# Patient Record
Sex: MALE | Race: WHITE | NOT HISPANIC OR LATINO | Employment: OTHER | URBAN - METROPOLITAN AREA
[De-identification: names, ages, dates, MRNs, and addresses within clinical notes are randomized per-mention and may not be internally consistent; named-entity substitution may affect disease eponyms.]

---

## 2017-01-05 ENCOUNTER — MEDICAL CORRESPONDENCE (OUTPATIENT)
Dept: FAMILY MEDICINE | Facility: CLINIC | Age: 72
End: 2017-01-05

## 2017-02-15 ENCOUNTER — TELEPHONE (OUTPATIENT)
Dept: FAMILY MEDICINE | Facility: CLINIC | Age: 72
End: 2017-02-15

## 2017-05-03 DIAGNOSIS — Z76.0 ENCOUNTER FOR MEDICATION REFILL: Primary | ICD-10-CM

## 2017-05-04 RX ORDER — TRAMADOL HYDROCHLORIDE 50 MG/1
TABLET ORAL
Qty: 21 TABLET | Refills: 0 | Status: SHIPPED | OUTPATIENT
Start: 2017-05-04 | End: 2017-10-10

## 2017-05-04 NOTE — TELEPHONE ENCOUNTER
Pending Prescriptions:                       Disp   Refills    traMADol (ULTRAM) 50 MG tablet [Pharmacy M*21 tab*0        Sig: TAKE 1 TABLET BY MOUTH THREE TIMES DAILY AS NEEDED           FOR 7 DAYS    Last OV 9/9/16  TSH, Lipid 5/26/16  CBC, BMP 7/13/15

## 2017-07-03 DIAGNOSIS — Z76.0 ENCOUNTER FOR MEDICATION REFILL: Primary | ICD-10-CM

## 2017-07-03 DIAGNOSIS — Z86.73 HISTORY OF STROKE: ICD-10-CM

## 2017-07-03 RX ORDER — METFORMIN HCL 500 MG
500 TABLET, EXTENDED RELEASE 24 HR ORAL
Qty: 90 TABLET | Refills: 0 | Status: SHIPPED | OUTPATIENT
Start: 2017-07-03 | End: 2017-10-16

## 2017-07-03 RX ORDER — FENOFIBRATE 160 MG/1
160 TABLET ORAL DAILY
Qty: 90 TABLET | Refills: 0 | Status: SHIPPED | OUTPATIENT
Start: 2017-07-03 | End: 2017-10-16

## 2017-10-10 ENCOUNTER — OFFICE VISIT (OUTPATIENT)
Dept: FAMILY MEDICINE | Facility: CLINIC | Age: 72
End: 2017-10-10

## 2017-10-10 VITALS
OXYGEN SATURATION: 96 % | WEIGHT: 220 LBS | DIASTOLIC BLOOD PRESSURE: 80 MMHG | HEART RATE: 85 BPM | BODY MASS INDEX: 31.12 KG/M2 | SYSTOLIC BLOOD PRESSURE: 114 MMHG

## 2017-10-10 DIAGNOSIS — G62.9 PERIPHERAL POLYNEUROPATHY: ICD-10-CM

## 2017-10-10 DIAGNOSIS — I63.10 CEREBROVASCULAR ACCIDENT (CVA) DUE TO EMBOLISM OF PRECEREBRAL ARTERY (H): ICD-10-CM

## 2017-10-10 DIAGNOSIS — Z23 NEED FOR PROPHYLACTIC VACCINATION AND INOCULATION AGAINST INFLUENZA: Primary | ICD-10-CM

## 2017-10-10 DIAGNOSIS — E11.49 DM TYPE 2 CAUSING NEUROLOGICAL DISEASE (H): ICD-10-CM

## 2017-10-10 DIAGNOSIS — M54.6 ACUTE MIDLINE THORACIC BACK PAIN: ICD-10-CM

## 2017-10-10 LAB
% GRANULOCYTES: 62.7 % (ref 42.2–75.2)
HCT VFR BLD AUTO: 53 % (ref 39–51)
HEMOGLOBIN: 17.1 G/DL (ref 13.4–17.5)
LYMPHOCYTES NFR BLD AUTO: 29.6 % (ref 20.5–51.1)
MCH RBC QN AUTO: 29.4 PG (ref 27–31)
MCHC RBC AUTO-ENTMCNC: 32.3 G/DL (ref 33–37)
MCV RBC AUTO: 90.8 FL (ref 80–100)
MONOCYTES NFR BLD AUTO: 7.7 % (ref 1.7–9.3)
PLATELET # BLD AUTO: 150 K/UL (ref 140–450)
RBC # BLD AUTO: 5.83 X10/CMM (ref 4.2–5.9)
WBC # BLD AUTO: 7.1 X10/CMM (ref 3.8–11)

## 2017-10-10 PROCEDURE — G0008 ADMIN INFLUENZA VIRUS VAC: HCPCS | Performed by: FAMILY MEDICINE

## 2017-10-10 PROCEDURE — 99214 OFFICE O/P EST MOD 30 MIN: CPT | Mod: 25 | Performed by: FAMILY MEDICINE

## 2017-10-10 PROCEDURE — 36415 COLL VENOUS BLD VENIPUNCTURE: CPT | Performed by: FAMILY MEDICINE

## 2017-10-10 PROCEDURE — 90662 IIV NO PRSV INCREASED AG IM: CPT | Performed by: FAMILY MEDICINE

## 2017-10-10 PROCEDURE — 80053 COMPREHEN METABOLIC PANEL: CPT | Mod: 90 | Performed by: FAMILY MEDICINE

## 2017-10-10 PROCEDURE — 85025 COMPLETE CBC W/AUTO DIFF WBC: CPT | Performed by: FAMILY MEDICINE

## 2017-10-10 PROCEDURE — 83036 HEMOGLOBIN GLYCOSYLATED A1C: CPT | Mod: 90 | Performed by: FAMILY MEDICINE

## 2017-10-10 RX ORDER — SOTALOL HYDROCHLORIDE 80 MG/1
TABLET ORAL
COMMUNITY
Start: 2017-05-11 | End: 2020-03-31

## 2017-10-10 RX ORDER — TRAMADOL HYDROCHLORIDE 50 MG/1
TABLET ORAL
Refills: 0 | COMMUNITY
Start: 2017-05-04 | End: 2020-02-25

## 2017-10-10 NOTE — PROGRESS NOTES
Injectable Influenza Immunization Documentation    1.  Is the person to be vaccinated sick today?   No    2. Does the person to be vaccinated have an allergy to a component   of the vaccine?   No    3. Has the person to be vaccinated ever had a serious reaction   to influenza vaccine in the past?   No    4. Has the person to be vaccinated ever had Guillain-Barré syndrome?   No    Form completed by MONET GUADARRAMA MA

## 2017-10-10 NOTE — MR AVS SNAPSHOT
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  Call member services at your health plan with any benefit or coverage questions.                  Who to contact     If you have questions or need follow up information about today's clinic visit or your schedule please contact [facility] directly at [phone].  Normal or non-critical lab and imaging results will be communicated to you by [employer], letter or phone within 4 business days after the clinic has received the results. If you do not hear from us within 7 days, please contact the clinic through [employer] or phone. If you have a critical or abnormal lab result, we will notify you by phone as soon as possible.  Submit refill requests through [employer] or call your pharmacy and they will forward the refill request to us. Please allow 3 business days for your refill to be completed.          Additional Information About Your Visit        [employer] Information     [employer] One lets you send messages to your doctor, view your test results, renew your prescriptions, schedule appointments and more. To sign  "up, go to [email or web address] . Click on \"Log in\" on the left side of the screen, which will take you to the Welcome page. Then click on \"Sign up Now\" on the right side of the page.     You will be asked to enter the access code listed below, as well as some personal information. Please follow the directions to create your username and password.     Your access code is: [email or web address]  Expires: 2018  1:59 PM     Your access code will  in 90 days. If you need help or a new code, please call your [facility] clinic or [phone].        Care EveryWhere ID     This is your Care EveryWhere ID. This could be used by other organizations to access your [facility] medical records  [id number]        Your Vitals Were     Pulse Pulse Oximetry BMI (Body Mass Index)             85 96% 31.12 kg/m2          Blood Pressure from Last 3 Encounters:   10/10/17 114/80   16 102/88   07/14/15 (!) 142/104    Weight from Last 3 Encounters:   10/10/17 99.8 kg (220 lb)   16 95.7 kg (211 lb)   07/13/15 100.4 kg (221 lb 5.5 oz)              We Performed the Following     CBC with Diff/Plt ([facility])     Comp. Metabolic Panel (14) ([facility])     FLU VACCINE, INCREASED ANTIGEN, PRESV FREE, AGE 65+ [01525]     Hemoglobin A1C ([facility])     Referral to [facility]          Today's Medication Changes          These changes are accurate as of: 10/10/17  1:59 PM.  If you have any questions, ask your nurse or doctor.               Start taking these medicines.        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  Ask your nurse if you have questions.     You don't need a prescription for these medications     ACE/ARB NOT " PRESCRIBED (INTENTIONAL)                Primary Care Provider Office Phone # Fax #    Sean Madrid [phone] [address]  [facility] [address]        Equal Access to Services     FRANCESCA HONG : Hadii aad ku debo Soomaali, waaxda luqadaha, qaybta kaalmada adeurielyada, ana phamn татьяна holden laheshamblaine yan. So [facility] [phone].    ATENCIÓN: Si habla español, tiene a muro disposición servicios gratuitos de asistencia lingüística. Llame al [phone].    We comply with applicable federal civil rights laws and [place] laws. We do not discriminate on the basis of race, color, national origin, age, disability, sex, sexual orientation, or gender identity.            Thank you!     Thank you for choosing [facility]  for your care. Our goal is always to provide you with excellent care. Hearing back from our patients is one way we can continue to improve our services. Please take a few minutes to complete the written survey that you may receive in the mail after your visit with us. Thank you!             Your Updated Medication List - Protect others around you: Learn how to safely use, store and throw away your medicines at www.disposemymeds.org.          This list is accurate as of: 10/10/17  1:59 PM.  Always use your most recent med list.                   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)       apixaban ANTICOAGULANT 5 MG tablet    ELIQUIS     Take 5 mg by mouth        ASPIRIN EC PO      Take 81 mg by mouth daily        colchicine 0.6 MG tablet      TAKE 2 TS BY MOUTH PRN AND REPEAT 1 T AFTER 1 HOUR        cyanocobalamin 1000 MCG tablet    vitamin  B-12     Take 5,000 mcg by mouth daily        digoxin 125 MCG tablet    LANOXIN    5 tablet    Take 1 tablet (125 mcg) by mouth daily    History of stroke, Atrial  fibrillation (H)       fenofibrate 160 MG tablet     90 tablet    Take 1 tablet (160 mg) by mouth daily    History of stroke       LIPITOR PO      Take 20 mg by mouth daily        metFORMIN 500 MG 24 hr tablet    GLUCOPHAGE-XR    90 tablet    Take 1 tablet (500 mg) by mouth daily (with dinner)    Encounter for medication refill       metoprolol 25 MG tablet    LOPRESSOR    10 tablet    Take 1 tablet (25 mg) by mouth 2 times daily    History of stroke       sotalol 80 MG tablet    BETAPACE          traMADol 50 MG tablet    ULTRAM     TK 1 T PO TID PRF 7 DAYS

## 2017-10-10 NOTE — LETTER
Former[facility]  [address] [place]  [address]  Phone: [phone]    October 13, 2017      Abhay Taylor  [address] [address]              Dear Abhay,    I am writing to report that your included test results are within expected ranges. I do not suggest that we make any changes at this time.         Sincerely,     Sean Madrid M.D.    Results for orders placed or performed in visit on 10/10/17   Comp. Metabolic Panel (14) ([facility])   Result Value Ref Range    Glucose 92 65 - 99 mg/dL    Urea Nitrogen 20 8 - 27 mg/dL    Creatinine 1.01 0.76 - 1.27 mg/dL    eGFR If NonAfricn Am 74 >59 mL/min/1.73    eGFR If Africn Am 86 >59 mL/min/1.73    BUN/Creatinine Ratio 20 10 - 24    Sodium 143 134 - 144 mmol/L    Potassium 5.0 3.5 - 5.2 mmol/L    Chloride 103 96 - 106 mmol/L    Total CO2 26 18 - 28 mmol/L    Calcium 9.6 8.6 - 10.2 mg/dL    Protein Total 7.2 6.0 - 8.5 g/dL    Albumin 4.7 3.5 - 4.8 g/dL    Globulin, Total 2.5 1.5 - 4.5 g/dL    A/G Ratio 1.9 1.2 - 2.2    Bilirubin Total 0.5 0.0 - 1.2 mg/dL    Alkaline Phosphatase 62 39 - 117 IU/L    AST 21 0 - 40 IU/L    ALT 25 0 - 44 IU/L    Narrative    Performed at:  01 - [facility] [address], [place], [place]  [id number]  : Luis Hidalgo MD, Phone:  [phone]   CBC with Diff/Plt ([facility])   Result Value Ref Range    WBC x10/cmm 7.1 3.8 - 11.0 x10/cmm    % Lymphocytes 29.6 20.5 - 51.1 %    % Monocytes 7.7 1.7 - 9.3 %    % Granulocytes 62.7 42.2 - 75.2 %    RBC x10/cmm 5.83 4.2 - 5.9 x10/cmm    Hemoglobin 17.1 13.4 - 17.5 g/dl    Hematocrit 53.0 (A) 39 - 51 %    MCV 90.8 80 - 100 fL    MCH 29.4 27.0 - 31.0 pg    MCHC 32.3 (A) 33.0 - 37.0 g/dL    Platelet Count 150 140 - 450 K/uL   Hemoglobin A1C ([facility])   Result Value Ref Range    Hemoglobin A1C 6.0 (H) 4.8 - 5.6 %    Narrative    Performed at:  01 - [facility] [address], [place], [place]  [id number]  : Luis Hidalgo MD, Phone:   [phone]

## 2017-10-10 NOTE — PROGRESS NOTES
Abhay Taylor is a 72 year old male who presents with burning low back 6/10 for 1 month.    There is no history of injury.    The pain is located on the left.  The back pain does not radiate.  Measures which improve the pain include heat and hot tub.  Measures which worsen the pain include sneezing, lying and sitting  There is no history of abdominal pain or change in urination.    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      Hypertension      Peripheral polyneuropathy 9/9/2016     Presbycusis of both ears 9/9/2016    severe       Social History     Social History     Marital status:      Spouse name: N/A     Number of children: N/A     Years of education: N/A     Occupational History            d     Social History Main Topics     Smoking status: Former Smoker     Smokeless tobacco: Never Used     Alcohol use 1.2 oz/week     2 Standard drinks or equivalent per week     Drug use: Not on file     Sexual activity: Not on file     Other Topics Concern     Not on file     Social History Narrative       EXAM: The patient today appears comfortable.  VITALS: Blood pressure 114/80, pulse 85, weight 99.8 kg (220 lb), SpO2 96 %.  Back symmetric, no curvature. ROM normal. No CVA tenderness.  EXT: Lower extremities have excellent strength bilatterally  NEURO:  Sensation to light touch intact bilaterally  DTRs normal and symmetric throughout    Assessment/Plan:  Abhay was seen today for back dull pain  and flu shot.    Diagnoses and all orders for this visit:    Need for prophylactic vaccination and inoculation against influenza  -     FLU VACCINE, INCREASED ANTIGEN, PRESV FREE, AGE 65+ [36844]    Acute midline thoracic back pain  -     Referral to [facility]    Cerebrovascular accident (CVA) due to embolism of precerebral artery (H)  -     Comp. 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  Apply heat thereafter as needed.    Active range of motion exercises encouraged.    Proper lifting techniques and back care discussed.   Physical therapy will be considered if the patient is not improving in the next several weeks.    Sean Madrid MD

## 2017-10-11 LAB
ALBUMIN SERPL-MCNC: 4.7 G/DL (ref 3.5–4.8)
ALBUMIN/GLOB SERPL: 1.9 {RATIO} (ref 1.2–2.2)
ALP SERPL-CCNC: 62 IU/L (ref 39–117)
ALT SERPL-CCNC: 25 IU/L (ref 0–44)
AST SERPL-CCNC: 21 IU/L (ref 0–40)
BILIRUB SERPL-MCNC: 0.5 MG/DL (ref 0–1.2)
BUN SERPL-MCNC: 20 MG/DL (ref 8–27)
BUN/CREATININE RATIO: 20 (ref 10–24)
CALCIUM SERPL-MCNC: 9.6 MG/DL (ref 8.6–10.2)
CHLORIDE SERPLBLD-SCNC: 103 MMOL/L (ref 96–106)
CREAT SERPL-MCNC: 1.01 MG/DL (ref 0.76–1.27)
EGFR IF AFRICN AM: 86 ML/MIN/1.73
EGFR IF NONAFRICN AM: 74 ML/MIN/1.73
GLOBULIN, TOTAL: 2.5 G/DL (ref 1.5–4.5)
GLUCOSE SERPL-MCNC: 92 MG/DL (ref 65–99)
HBA1C MFR BLD: 6 % (ref 4.8–5.6)
POTASSIUM SERPL-SCNC: 5 MMOL/L (ref 3.5–5.2)
PROT SERPL-MCNC: 7.2 G/DL (ref 6–8.5)
SODIUM SERPL-SCNC: 143 MMOL/L (ref 134–144)
TOTAL CO2: 26 MMOL/L (ref 18–28)

## 2017-10-12 NOTE — PROGRESS NOTES
Dear Abhay,   I am writing to report that your included test results are within expected ranges. I do not suggest that we make any changes at this time.    Sean Madrid M.D.

## 2017-10-16 DIAGNOSIS — Z76.0 ENCOUNTER FOR MEDICATION REFILL: ICD-10-CM

## 2017-10-16 DIAGNOSIS — Z86.73 HISTORY OF STROKE: ICD-10-CM

## 2017-10-17 RX ORDER — FENOFIBRATE 160 MG/1
TABLET ORAL
Qty: 90 TABLET | Refills: 0 | Status: SHIPPED | OUTPATIENT
Start: 2017-10-17 | End: 2018-02-08

## 2017-10-17 RX ORDER — METFORMIN HCL 500 MG
TABLET, EXTENDED RELEASE 24 HR ORAL
Qty: 90 TABLET | Refills: 0 | Status: SHIPPED | OUTPATIENT
Start: 2017-10-17 | End: 2018-02-08

## 2018-02-08 DIAGNOSIS — Z76.0 ENCOUNTER FOR MEDICATION REFILL: Primary | ICD-10-CM

## 2018-02-08 DIAGNOSIS — Z86.73 HISTORY OF STROKE: ICD-10-CM

## 2018-02-08 RX ORDER — FENOFIBRATE 160 MG/1
160 TABLET ORAL DAILY
Qty: 90 TABLET | Refills: 1 | Status: SHIPPED | OUTPATIENT
Start: 2018-02-08 | End: 2018-08-08

## 2018-02-08 RX ORDER — BLOOD GLUCOSE CONTROL HIGH,LOW
EACH MISCELLANEOUS
Qty: 1 BOTTLE | Refills: 3 | Status: SHIPPED | OUTPATIENT
Start: 2018-02-08 | End: 2019-08-25

## 2018-02-08 RX ORDER — METFORMIN HCL 500 MG
TABLET, EXTENDED RELEASE 24 HR ORAL
Qty: 90 TABLET | Refills: 1 | Status: SHIPPED | OUTPATIENT
Start: 2018-02-08 | End: 2018-08-08

## 2018-02-08 RX ORDER — LANCETS
EACH MISCELLANEOUS
Qty: 300 EACH | Refills: 3 | Status: SHIPPED | OUTPATIENT
Start: 2018-02-08 | End: 2019-08-25

## 2018-02-08 RX ORDER — BLOOD-GLUCOSE METER
EACH MISCELLANEOUS
Qty: 1 KIT | Refills: 0 | Status: SHIPPED | OUTPATIENT
Start: 2018-02-08 | End: 2019-08-25

## 2018-02-08 RX ORDER — BLOOD PRESSURE TEST KIT
1 KIT MISCELLANEOUS 3 TIMES DAILY
Qty: 300 EACH | Refills: 3 | Status: SHIPPED | OUTPATIENT
Start: 2018-02-08 | End: 2019-08-25

## 2018-02-21 ENCOUNTER — TRANSFERRED RECORDS (OUTPATIENT)
Dept: FAMILY MEDICINE | Facility: CLINIC | Age: 73
End: 2018-02-21

## 2018-03-22 ENCOUNTER — MEDICAL CORRESPONDENCE (OUTPATIENT)
Dept: FAMILY MEDICINE | Facility: CLINIC | Age: 73
End: 2018-03-22

## 2018-05-30 ENCOUNTER — OFFICE VISIT (OUTPATIENT)
Dept: FAMILY MEDICINE | Facility: CLINIC | Age: 73
End: 2018-05-30

## 2018-05-30 VITALS
SYSTOLIC BLOOD PRESSURE: 112 MMHG | WEIGHT: 218 LBS | OXYGEN SATURATION: 97 % | BODY MASS INDEX: 30.84 KG/M2 | HEART RATE: 78 BPM | RESPIRATION RATE: 16 BRPM | DIASTOLIC BLOOD PRESSURE: 86 MMHG

## 2018-05-30 DIAGNOSIS — E55.9 VITAMIN D DEFICIENCY: ICD-10-CM

## 2018-05-30 DIAGNOSIS — H91.13 PRESBYCUSIS OF BOTH EARS: ICD-10-CM

## 2018-05-30 DIAGNOSIS — E78.5 HYPERLIPIDEMIA LDL GOAL <100: ICD-10-CM

## 2018-05-30 DIAGNOSIS — Z11.59 NEED FOR HEPATITIS C SCREENING TEST: ICD-10-CM

## 2018-05-30 DIAGNOSIS — E11.49 DM TYPE 2 CAUSING NEUROLOGICAL DISEASE (H): ICD-10-CM

## 2018-05-30 DIAGNOSIS — Z12.11 SPECIAL SCREENING FOR MALIGNANT NEOPLASMS, COLON: ICD-10-CM

## 2018-05-30 DIAGNOSIS — I10 ESSENTIAL HYPERTENSION: ICD-10-CM

## 2018-05-30 DIAGNOSIS — I48.20 CHRONIC ATRIAL FIBRILLATION (H): Primary | ICD-10-CM

## 2018-05-30 DIAGNOSIS — G62.9 PERIPHERAL POLYNEUROPATHY: ICD-10-CM

## 2018-05-30 DIAGNOSIS — Z86.73 HISTORY OF STROKE: ICD-10-CM

## 2018-05-30 DIAGNOSIS — Z13.6 SCREENING FOR AAA (ABDOMINAL AORTIC ANEURYSM): ICD-10-CM

## 2018-05-30 DIAGNOSIS — H53.462 HOMONYMOUS BILATERAL FIELD DEFECTS OF LEFT SIDE: ICD-10-CM

## 2018-05-30 LAB
% GRANULOCYTES: 61.8 % (ref 42.2–75.2)
A/C RATIO MG/G: ABNORMAL MG/G
ALBUMIN (URINE) MG/L: 80 MG/L
HCT VFR BLD AUTO: 53.1 % (ref 39–51)
HEMOGLOBIN: 17.4 G/DL (ref 13.4–17.5)
INTERPRETATION: ABNORMAL
LYMPHOCYTES NFR BLD AUTO: 29.7 % (ref 20.5–51.1)
MCH RBC QN AUTO: 29.6 PG (ref 27–31)
MCHC RBC AUTO-ENTMCNC: 32.7 G/DL (ref 33–37)
MCV RBC AUTO: 90.3 FL (ref 80–100)
MONOCYTES NFR BLD AUTO: 8.5 % (ref 1.7–9.3)
PLATELET # BLD AUTO: 139 K/UL (ref 140–450)
RBC # BLD AUTO: 5.88 X10/CMM (ref 4.2–5.9)
URINE CREATININE MG/DL - QUEST: 200 MG/DL
WBC # BLD AUTO: 5.8 X10/CMM (ref 3.8–11)

## 2018-05-30 PROCEDURE — 36415 COLL VENOUS BLD VENIPUNCTURE: CPT | Performed by: FAMILY MEDICINE

## 2018-05-30 PROCEDURE — 99214 OFFICE O/P EST MOD 30 MIN: CPT | Performed by: FAMILY MEDICINE

## 2018-05-30 PROCEDURE — 82570 ASSAY OF URINE CREATININE: CPT | Performed by: FAMILY MEDICINE

## 2018-05-30 PROCEDURE — 82044 UR ALBUMIN SEMIQUANTITATIVE: CPT | Performed by: FAMILY MEDICINE

## 2018-05-30 PROCEDURE — 85025 COMPLETE CBC W/AUTO DIFF WBC: CPT | Performed by: FAMILY MEDICINE

## 2018-05-30 RX ORDER — GABAPENTIN 100 MG/1
100 CAPSULE ORAL
COMMUNITY
End: 2020-01-21

## 2018-05-30 NOTE — PROGRESS NOTES
SUBJECTIVE:   Abhay Taylor is a 73 year old male who presents to clinic today for the following health issues:    White male with glasses  H/o stroke age 66 years old  Balance issues walking catching toe.   Right eye tunnel vision finger count, left eye movement. Not using white cane.     House 2 story Rebuilding the basement  Neurology one week ago  Having trouble remembering people/faces  Speech is good  Swallowing ok if chewing well. Large medication pills sometimes an issues.   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     Dog  Adult tricycle with battery, fell off recently    Diabetes Follow-up    Patient is checking blood sugars: twice daily.    Blood sugar testing frequency justification:   Results are as follows:         am          bedtime     Diabetic concerns: other - has been under 70 few weeks ago,now range      Symptoms of hypoglycemia (low blood sugar): yes few weeks ago, now much better.     Paresthesias (numbness or burning in feet) or sores: Yes both feet, mainly in the morning.     Date of last diabetic eye exam: two weeks ago    BP Readings from Last 2 Encounters:   05/30/18 112/86   10/10/17 114/80     Hemoglobin A1C (%)   Date Value   05/30/2018 6.1 (H)   10/10/2017 6.0 (H)     LDL Cholesterol Calculated (mg/dL)   Date Value   05/30/2018 80   05/26/2016 39               Problem list and histories reviewed & adjusted, as indicated.  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)     Presbycusis of both ears     Peripheral polyneuropathy     Past Surgical History:   Procedure  Laterality Date     EP ABLATION / EP STUDIES       RELEASE CARPAL TUNNEL BILATERAL         Social History   Substance Use Topics     Smoking status: Former Smoker     Smokeless tobacco: Never Used     Alcohol use 1.2 oz/week     2 Standard drinks or equivalent per week     Family History   Problem Relation Age of Onset     Colon Cancer Father          Current Outpatient Prescriptions   Medication Sig Dispense Refill     ACE/ARB NOT PRESCRIBED, INTENTIONAL, Please choose reason not prescribed, below       Alcohol Swabs PADS 1 each 3 times daily 300 each 3     apixaban ANTICOAGULANT (ELIQUIS) 5 MG tablet Take 5 mg by mouth       ASPIRIN EC PO Take 81 mg by mouth daily       Atorvastatin Calcium (LIPITOR PO) Take 20 mg by mouth daily        blood glucose calibration (ACCU-CHEK HELENA) solution Use to calibrate blood glucose monitor as needed as directed. 1 Bottle 3     blood glucose monitoring (ACCU-CHEK HELENA PLUS) meter device kit Use to test blood sugar 3 times daily or as directed. 1 kit 0     blood glucose monitoring (ACCU-CHEK HELENA PLUS) test strip Use to test blood sugar 3 times daily or as directed. 3 Box 3     blood glucose monitoring (SOFTCLIX) lancets Use to test blood sugar 3 times daily or as directed. 300 each 3     digoxin (LANOXIN) 125 MCG tablet Take 1 tablet (125 mcg) by mouth daily 5 tablet 0     fenofibrate 160 MG tablet Take 1 tablet (160 mg) by mouth daily 90 tablet 1     gabapentin (NEURONTIN) 100 MG capsule Take 100 mg by mouth       metFORMIN (GLUCOPHAGE-XR) 500 MG 24 hr tablet TAKE ONE TABLET BY MOUTH EVERY DAY WITH DINNER 90 tablet 1     metoprolol (LOPRESSOR) 25 MG tablet Take 1 tablet (25 mg) by mouth 2 times daily 10 tablet 0     sotalol (BETAPACE) 80 MG tablet        traMADol (ULTRAM) 50 MG tablet TK 1 T PO TID PRF 7 DAYS  0     No Known Allergies  Recent Labs   Lab Test  05/30/18   1000  05/30/18   0959  10/10/17   1559  10/10/17   1558  05/26/16   1112  07/13/15   1136  07/13/15   1130    A1C  6.1*   --    --   6.0*  5.9*   --   5.8   LDL   --   80   --    --   39   --   Cannot estimate LDL when triglyceride exceeds 400 mg/dL  127   HDL   --   29*   --    --   35*   --   30*   TRIG   --   398*   --    --   218*   --   634*   ALT   --   28  25   --    --    --    --    CR   --   1.19  1.01   --   0.97   --   0.89   GFRESTIMATED   --    --    --    --    --   74  85   GFRESTBLACK   --    --    --    --    --   90  >90   GFR Calc     POTASSIUM   --   4.7  5.0   --    --    --   4.2   TSH   --    --    --    --   2.690   --   2.51      BP Readings from Last 3 Encounters:   05/30/18 112/86   10/10/17 114/80   09/09/16 102/88    Wt Readings from Last 3 Encounters:   05/30/18 98.9 kg (218 lb)   10/10/17 99.8 kg (220 lb)   09/09/16 95.7 kg (211 lb)                  Labs reviewed in EPIC    Reviewed and updated as needed this visit by clinical staff       Reviewed and updated as needed this visit by Provider         ROS:  Constitutional, HEENT, cardiovascular, pulmonary, GI, , musculoskeletal, neuro, skin, endocrine and psych systems are negative, except as otherwise noted.    OBJECTIVE:     /86  Pulse 78  Resp 16  Wt 98.9 kg (218 lb)  SpO2 97%  BMI 30.84 kg/m2  Body mass index is 30.84 kg/(m^2).  GENERAL: healthy, alert and no distress  EYES: Eyes grossly normal to inspection, PERRL and conjunctivae and sclerae normal  HENT: ear canals and TM's normal, nose and mouth without ulcers or lesions  NECK: no adenopathy, no asymmetry, masses, or scars and thyroid normal to palpation  RESP: lungs clear to auscultation - no rales, rhonchi or wheezes  CV: regular rate and rhythm, normal S1 S2, no S3 or S4, no murmur, click or rub, no peripheral edema and peripheral pulses strong  ABDOMEN: soft, nontender, no hepatosplenomegaly, no masses and bowel sounds normal  MS: no gross musculoskeletal defects noted, no edema  SKIN: no suspicious lesions or rashes  NEURO: Normal strength and tone,  mentation intact and speech normal  PSYCH: mentation appears normal, affect normal/bright  LYMPH: no cervical, supraclavicular, axillary, or inguinal adenopathy    Diagnostic Test Results:  Results for orders placed or performed in visit on 05/30/18   Comp. Metabolic Panel (14) ([facility])   Result Value Ref Range    Glucose 112 (H) 65 - 99 mg/dL    Urea Nitrogen 20 8 - 27 mg/dL    Creatinine 1.19 0.76 - 1.27 mg/dL    eGFR If NonAfricn Am 61 >59 mL/min/1.73    eGFR If Africn Am 70 >59 mL/min/1.73    BUN/Creatinine Ratio 17 10 - 24    Sodium 143 134 - 144 mmol/L    Potassium 4.7 3.5 - 5.2 mmol/L    Chloride 102 96 - 106 mmol/L    Total CO2 23 18 - 28 mmol/L    Calcium 9.6 8.6 - 10.2 mg/dL    Protein Total 7.0 6.0 - 8.5 g/dL    Albumin 4.6 3.5 - 4.8 g/dL    Globulin, Total 2.4 1.5 - 4.5 g/dL    A/G Ratio 1.9 1.2 - 2.2    Bilirubin Total 0.6 0.0 - 1.2 mg/dL    Alkaline Phosphatase 57 39 - 117 IU/L    AST 30 0 - 40 IU/L    ALT 28 0 - 44 IU/L    Narrative    Performed at:  01 - [facility] [address], [place], [place]  [id number]  : Luis Hidalgo MD, Phone:  [phone]   Lipid Panel ([facility])   Result Value Ref Range    Cholesterol 189 100 - 199 mg/dL    Triglycerides 398 (H) 0 - 149 mg/dL    HDL Cholesterol 29 (L) >39 mg/dL    VLDL Cholesterol [place] 80 (H) 5 - 40 mg/dL    LDL Cholesterol Calculated 80 0 - 99 mg/dL    LDL/HDL Ratio 2.8 0.0 - 3.6 ratio    Narrative    Performed at:  01 - [facility] [address], [place], [place]  [id number]  : Luis Hidalgo MD, Phone:  [phone]   CBC with Diff/Plt ([facility])   Result Value Ref Range    WBC x10/cmm 5.8 3.8 - 11.0 x10/cmm    % Lymphocytes 29.7 20.5 - 51.1 %    % Monocytes 8.5 1.7 - 9.3 %    % Granulocytes 61.8 42.2 - 75.2 %    RBC x10/cmm 5.88 4.2 - 5.9 x10/cmm    Hemoglobin 17.4 13.4 - 17.5 g/dl    Hematocrit 53.1 (A) 39 - 51 %    MCV 90.3 80 - 100 fL    MCH 29.6 27.0 - 31.0 pg    MCHC 32.7 (A) 33.0 - 37.0 g/dL    Platelet Count 139 (A) 140 -  450 K/uL   TSH ([facility])   Result Value Ref Range    TSH 3.540 0.450 - 4.500 uIU/mL    Narrative    Performed at:  01 - [facility] [address], [place], [place]  [id number]  : Luis Hidalgo MD, Phone:  [phone]   Thyroxine (T4) Free  Direct  S ([facility])   Result Value Ref Range    T4 Free 1.04 0.82 - 1.77 ng/dL    Narrative    Performed at:  01 - [facility] [address], [place], [place]  [id number]  : Luis Hidalgo MD, Phone:  [phone]   Hemoglobin A1C ([facility])   Result Value Ref Range    Hemoglobin A1C 6.1 (H) 4.8 - 5.6 %    Narrative    Performed at:  01 - [facility] [address], [place], [place]  [id number]  : Luis Hidalgo MD, Phone:  [phone]   Vitamin D  25-Hydroxy ([facility])   Result Value Ref Range    Vitamin D,25-Hydroxy 32.9 30.0 - 100.0 ng/mL    Narrative    Performed at:  01 - [facility] [address], [place], [place]  [id number]  : Luis Hidalgo MD, Phone:  [phone]   Microalbumin ([facility])   Result Value Ref Range    Albumin mg/L 80 mg/L    Urine Creatinine Mg/Dl 200 mg/dL    A/C Ratio mg/g  mg/g    Interpretation     HCV Antibody ([facility])   Result Value Ref Range    Hep C Virus Ab <0.1 0.0 - 0.9 s/co ratio    Narrative    Performed at:  01 - [facility] [address], [place], [place]  [id number]  : Luis Hidalgo MD, Phone:  [phone]       ASSESSMENT/PLAN:   ASSESSMENT / PLAN:  (I48.2) Chronic atrial fibrillation (H)  (primary encounter diagnosis)  Comment: no CP today  Plan: continue cares on eliquis    (H53.462) 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 Not using white cane. Fall risk. Fire risk.   Plan: I encouraged contact with [employer] [facility]. He did not seem interested at this time.    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 Metabolic Panel (14) ([facility]), CBC with         Diff/Plt ([facility]), TSH ([facility]), Thyroxine (T4)         Free  Direct  S ([facility]), Hemoglobin A1C         ([facility]), Microalbumin ([facility])            (H91.13) Presbycusis of both ears  Comment:   Plan: Hearing aids    (G62.9) Peripheral polyneuropathy  Comment:   Plan: Continue cares    (I10) Essential hypertension  Comment:   BP Readings from Last 3 Encounters:   05/30/18 112/86   10/10/17 114/80   09/09/16 102/88       Plan: Comp. Metabolic Panel (14) ([facility])            (Z86.73) History of stroke  Comment:   Plan: Monitor risk factors    (E55.9) Vitamin D deficiency  Comment:   Plan: Vitamin D  25-Hydroxy ([facility])            (E78.5) Hyperlipidemia LDL goal <100  Comment:   LDL Cholesterol Calculated   Date Value Ref Range Status   05/30/2018 80 0 - 99 mg/dL Final   ]    Plan: Lipid Panel ([facility])            (Z11.59) Need for hepatitis C screening test  Comment:   Plan: HCV Antibody ([facility])            (Z13.6) Screening for AAA (abdominal aortic aneurysm)  Comment:   Plan: Referral to [facility]            (Z12.11) Special screening for malignant neoplasms, colon  Comment: FAMILY HX OF POLYPS - FATHER  Plan: GASTROENTEROLOGY ADULT REF CONSULT ONLY                Patient Instructions   Weight loss is recommended  My [email or web address]    Vaccines due: Tetanus booster, Shingrix, pneumonia at pharmacy    You declined PSA recheck    Schedule US AAA Screen and colonoscopy    Labs today    Your cardiology doctor visit February did Echo and f/u one year.    You thought you had all of your medications          Alicia Stevens MD  [facility]

## 2018-05-30 NOTE — LETTER
[facility]  [address] [place]  [address]  Phone: [phone]    June 4, 2018      Abhay Taylor  [address] [facility] [address]              Dear Abhay,    The results from your recent visit showed a1c was good  CMP was ok  Triglycerides were higher. Eat more fish, fish oil, ground flax, oatmeal. Eat LESS SUGAR.  LDL was good. Risk ratio was fine.  Thyroid test was good  Vitamin D was good  Hepatitis C screen was normal  CBC WBC and hemoglobin ok, platelets are just under normal.        Sincerely,     Alicia Stevens M.D.    Results for orders placed or performed in visit on 05/30/18   Comp. Metabolic Panel (14) ([facility])   Result Value Ref Range    Glucose 112 (H) 65 - 99 mg/dL    Urea Nitrogen 20 8 - 27 mg/dL    Creatinine 1.19 0.76 - 1.27 mg/dL    eGFR If NonAfricn Am 61 >59 mL/min/1.73    eGFR If Africn Am 70 >59 mL/min/1.73    BUN/Creatinine Ratio 17 10 - 24    Sodium 143 134 - 144 mmol/L    Potassium 4.7 3.5 - 5.2 mmol/L    Chloride 102 96 - 106 mmol/L    Total CO2 23 18 - 28 mmol/L    Calcium 9.6 8.6 - 10.2 mg/dL    Protein Total 7.0 6.0 - 8.5 g/dL    Albumin 4.6 3.5 - 4.8 g/dL    Globulin, Total 2.4 1.5 - 4.5 g/dL    A/G Ratio 1.9 1.2 - 2.2    Bilirubin Total 0.6 0.0 - 1.2 mg/dL    Alkaline Phosphatase 57 39 - 117 IU/L    AST 30 0 - 40 IU/L    ALT 28 0 - 44 IU/L    Narrative    Performed at:  01 - [facility] [address], [place], [place]  [id number]  : Luis Hidalgo MD, Phone:  [phone]   Lipid Panel ([facility])   Result Value Ref Range    Cholesterol 189 100 - 199 mg/dL    Triglycerides 398 (H) 0 - 149 mg/dL    HDL Cholesterol 29 (L) >39 mg/dL    VLDL Cholesterol Sixto 80 (H) 5 - 40 mg/dL    LDL Cholesterol Calculated 80 0 - 99 mg/dL    LDL/HDL Ratio 2.8 0.0 - 3.6 ratio    Narrative    Performed at:  01 - [facility] [address], [place], [place]  [id number]  : Luis Hidalgo MD, Phone:  [phone]   CBC with Diff/Plt ([facility])    Result Value Ref Range    WBC x10/cmm 5.8 3.8 - 11.0 x10/cmm    % Lymphocytes 29.7 20.5 - 51.1 %    % Monocytes 8.5 1.7 - 9.3 %    % Granulocytes 61.8 42.2 - 75.2 %    RBC x10/cmm 5.88 4.2 - 5.9 x10/cmm    Hemoglobin 17.4 13.4 - 17.5 g/dl    Hematocrit 53.1 (A) 39 - 51 %    MCV 90.3 80 - 100 fL    MCH 29.6 27.0 - 31.0 pg    MCHC 32.7 (A) 33.0 - 37.0 g/dL    Platelet Count 139 (A) 140 - 450 K/uL   TSH ([facility])   Result Value Ref Range    TSH 3.540 0.450 - 4.500 uIU/mL    Narrative    Performed at:  01 - [facility] [address], [place], [place]  [id number]  : Luis Hidalgo MD, Phone:  [phone]   Thyroxine (T4) Free  Direct  S ([facility])   Result Value Ref Range    T4 Free 1.04 0.82 - 1.77 ng/dL    Narrative    Performed at:  01 - [facility] [address], [place], [place]  [id number]  : Luis Hidalgo MD, Phone:  [phone]   Hemoglobin A1C ([facility])   Result Value Ref Range    Hemoglobin A1C 6.1 (H) 4.8 - 5.6 %    Narrative    Performed at:  01 - [facility] [address], [place], [place]  [id number]  : Luis Hidalgo MD, Phone:  [phone]   Vitamin D  25-Hydroxy ([facility])   Result Value Ref Range    Vitamin D,25-Hydroxy 32.9 30.0 - 100.0 ng/mL    Narrative    Performed at:  01 - [facility] [address], [place], [place]  [id number]  : Luis Hidalgo MD, Phone:  [phone]   Microalbumin ([facility])   Result Value Ref Range    Albumin mg/L 80 mg/L    Urine Creatinine Mg/Dl 200 mg/dL    A/C Ratio mg/g  mg/g    Interpretation     HCV Antibody ([facility])   Result Value Ref Range    Hep C Virus Ab <0.1 0.0 - 0.9 s/co ratio    Narrative    Performed at:  01 - [facility] [address], [place], [place]  [id number]  : Luis Hidalgo MD, Phone:  [phone]

## 2018-05-30 NOTE — MR AVS SNAPSHOT
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    You thought you had all of your medications              Follow-ups after your visit        Who to contact     If you have questions or need follow up information about today's clinic visit or your schedule please contact [facility] directly at [phone].  Normal or non-critical lab and imaging results will be communicated to you by ResearchGatehart, letter or phone within 4 business days after the clinic has received the results. If you do not hear from us within 7 days, please contact the clinic through [employer] or phone. If you have a critical or abnormal lab result, we will notify you by phone as soon as possible.  Submit refill requests through Capzles or call your pharmacy and they will forward the refill request to us. Please allow 3 business days for your refill to be completed.          Additional Information About Your Visit        ResearchGate[employer] Services Information     Capzles lets you send messages to your doctor, view your test results, renew your  "prescriptions, schedule appointments and more. To sign up, go to [email or web address] . Click on \"Log in\" on the left side of the screen, which will take you to the Welcome page. Then click on \"Sign up Now\" on the right side of the page.     You will be asked to enter the access code listed below, as well as some personal information. Please follow the directions to create your username and password.     Your access code is: [email or web address]  Expires: 2018  9:23 AM     Your access code will  in 90 days. If you need help or a new code, please call your [facility] clinic or [phone].        Care EveryWhere ID     This is your Care EveryWhere ID. This could be used by other organizations to access your [facility] medical records  [id number]        Your Vitals Were     Pulse Respirations Pulse Oximetry BMI (Body Mass Index)          78 16 97% 30.84 kg/m2         Blood Pressure from Last 3 Encounters:   18 112/86   10/10/17 114/80   16 102/88    Weight from Last 3 Encounters:   18 98.9 kg (218 lb)   10/10/17 99.8 kg (220 lb)   16 95.7 kg (211 lb)              We Performed the Following     CBC with Diff/Plt ([facility])     Comp. Metabolic Panel (14) ([facility])     HCV Antibody ([facility])     Hemoglobin A1C ([facility])     Lipid Panel ([facility])     Microalbumin ([facility])     Thyroxine (T4) Free  Direct  S ([facility])     TSH ([facility])     Vitamin D  25-Hydroxy ([facility])        Primary Care Provider Office Phone # Fax #    Sean Madrid [phone] [address] [place] [address]        Equal Access to Services     [facility] HAL : Chang De La Garza, wasony kumar, qaybta kaalmajada colin, ana yan. [facility] [phone].    ATENCIÓN: Si habla español, tiene a muro disposición servicios gratuitos de asistencia lingüística. Llame al [phone].    We comply with applicable federal civil rights laws and [place] laws. " We do not discriminate on the basis of race, color, national origin, age, disability, sex, sexual orientation, or gender identity.            Thank you!     Thank you for choosing [facility]  for your care. Our goal is always to provide you with excellent care. Hearing back from our patients is one way we can continue to improve our services. Please take a few minutes to complete the written survey that you may receive in the mail after your visit with us. Thank you!             Your Updated Medication List - Protect others around you: Learn how to safely use, store and throw away your medicines at www.disposemymeds.org.          This list is accurate as of 5/30/18  9:23 AM.  Always use your most recent med list.                   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    Encounter for medication refill       blood glucose monitoring lancets     300 each    Use to test blood sugar 3 times daily or as directed.    Encounter for medication refill       blood glucose monitoring meter device kit     1 kit    Use to test blood sugar 3 times daily or as directed.    Encounter for medication refill       blood glucose monitoring test strip    ACCU-CHEK HELENA PLUS    3 Box    Use to test blood sugar 3 times daily or as directed.    Encounter for medication refill       digoxin 125 MCG tablet    LANOXIN    5 tablet    Take 1 tablet (125 mcg) by mouth daily    History of stroke, Atrial fibrillation (H)       fenofibrate 160 MG  tablet     90 tablet    Take 1 tablet (160 mg) by mouth daily    History of stroke       gabapentin 100 MG capsule    NEURONTIN     Take 100 mg by mouth        LIPITOR PO      Take 20 mg by mouth daily        metFORMIN 500 MG 24 hr tablet    GLUCOPHAGE-XR    90 tablet    TAKE ONE TABLET BY MOUTH EVERY DAY WITH DINNER    Encounter for medication refill       metoprolol tartrate 25 MG tablet    LOPRESSOR    10 tablet    Take 1 tablet (25 mg) by mouth 2 times daily    History of stroke       sotalol 80 MG tablet    BETAPACE          traMADol 50 MG tablet    ULTRAM     TK 1 T PO TID PRF 7 DAYS

## 2018-05-31 LAB — HBA1C MFR BLD: 6.1 % (ref 4.8–5.6)

## 2018-06-01 LAB
ALBUMIN SERPL-MCNC: 4.6 G/DL (ref 3.5–4.8)
ALBUMIN/GLOB SERPL: 1.9 {RATIO} (ref 1.2–2.2)
ALP SERPL-CCNC: 57 IU/L (ref 39–117)
ALT SERPL-CCNC: 28 IU/L (ref 0–44)
AST SERPL-CCNC: 30 IU/L (ref 0–40)
BILIRUB SERPL-MCNC: 0.6 MG/DL (ref 0–1.2)
BUN SERPL-MCNC: 20 MG/DL (ref 8–27)
BUN/CREATININE RATIO: 17 (ref 10–24)
CALCIUM SERPL-MCNC: 9.6 MG/DL (ref 8.6–10.2)
CHLORIDE SERPLBLD-SCNC: 102 MMOL/L (ref 96–106)
CHOLEST SERPL-MCNC: 189 MG/DL (ref 100–199)
CREAT SERPL-MCNC: 1.19 MG/DL (ref 0.76–1.27)
EGFR IF AFRICN AM: 70 ML/MIN/1.73
EGFR IF NONAFRICN AM: 61 ML/MIN/1.73
GLOBULIN, TOTAL: 2.4 G/DL (ref 1.5–4.5)
GLUCOSE SERPL-MCNC: 112 MG/DL (ref 65–99)
HCV AB SERPL QL IA: <0.1 S/CO RATIO (ref 0–0.9)
HDLC SERPL-MCNC: 29 MG/DL
LDL/HDL RATIO: 2.8 RATIO (ref 0–3.6)
LDLC SERPL CALC-MCNC: 80 MG/DL (ref 0–99)
POTASSIUM SERPL-SCNC: 4.7 MMOL/L (ref 3.5–5.2)
PROT SERPL-MCNC: 7 G/DL (ref 6–8.5)
SODIUM SERPL-SCNC: 143 MMOL/L (ref 134–144)
T4 FREE SERPL-MCNC: 1.04 NG/DL (ref 0.82–1.77)
TOTAL CO2: 23 MMOL/L (ref 18–28)
TRIGL SERPL-MCNC: 398 MG/DL (ref 0–149)
TSH BLD-ACNC: 3.54 UIU/ML (ref 0.45–4.5)
VITAMIN D, 25-HYDROXY: 32.9 NG/ML (ref 30–100)
VLDLC SERPL CALC-MCNC: 80 MG/DL (ref 5–40)

## 2018-06-25 ENCOUNTER — TRANSFERRED RECORDS (OUTPATIENT)
Dept: FAMILY MEDICINE | Facility: CLINIC | Age: 73
End: 2018-06-25

## 2018-06-25 ENCOUNTER — MEDICAL CORRESPONDENCE (OUTPATIENT)
Dept: FAMILY MEDICINE | Facility: CLINIC | Age: 73
End: 2018-06-25

## 2018-06-25 NOTE — LETTER
[place] [address] [place]  [address]  Phone: [phone]    June 27, 2018      Abhay Taylor                                                                                                                                 [address] [place] [address]            Dear Abhay,    The results of your recent imaging tests showed Negative Aortic Aneurysm Screen.   If you have any questions or concerns, please call the clinic at [phone] and make a follow up appointment with me.      Sincerely,    Alicia Stevens M.D.

## 2018-06-25 NOTE — LETTER
[place] [facility] [address] [place] [address] [phone]      June 27, 2018      Abhay Taylor  [address] [address]              Dear Abhay,    No evidence of abdominal aortic aneurysm.       Sincerely,    Karen Marie M.D.  For   Dr Sean Madrid , who is out of the office

## 2018-08-07 ENCOUNTER — OFFICE VISIT (OUTPATIENT)
Dept: FAMILY MEDICINE | Facility: CLINIC | Age: 73
End: 2018-08-07

## 2018-08-07 VITALS
RESPIRATION RATE: 12 BRPM | DIASTOLIC BLOOD PRESSURE: 88 MMHG | HEART RATE: 76 BPM | WEIGHT: 228.2 LBS | BODY MASS INDEX: 32.28 KG/M2 | SYSTOLIC BLOOD PRESSURE: 122 MMHG

## 2018-08-07 DIAGNOSIS — M10.071 ACUTE IDIOPATHIC GOUT OF RIGHT FOOT: Primary | ICD-10-CM

## 2018-08-07 PROCEDURE — 99213 OFFICE O/P EST LOW 20 MIN: CPT | Performed by: NURSE PRACTITIONER

## 2018-08-07 RX ORDER — ETODOLAC 500 MG
500 TABLET ORAL 2 TIMES DAILY
Qty: 10 TABLET | Refills: 1 | Status: SHIPPED | OUTPATIENT
Start: 2018-08-07 | End: 2019-06-27

## 2018-08-07 RX ORDER — PREDNISONE 20 MG/1
20 TABLET ORAL 2 TIMES DAILY
Qty: 10 TABLET | Refills: 0 | Status: SHIPPED | OUTPATIENT
Start: 2018-08-07 | End: 2019-05-07

## 2018-08-07 RX ORDER — COLCHICINE 0.6 MG/1
TABLET ORAL
Qty: 10 TABLET | Refills: 1 | Status: SHIPPED | OUTPATIENT
Start: 2018-08-07 | End: 2019-06-27

## 2018-08-07 ASSESSMENT — PAIN SCALES - GENERAL: PAINLEVEL: WORST PAIN (10)

## 2018-08-07 NOTE — PROGRESS NOTES
Problem(s) Oriented visit        SUBJECTIVE:                                                    Abhay Taylor is a 73 year old male who presents to clinic today for the following health issues    Gout -     Onset: 1.5 week(s) ago, happens once a year if eats wrong, swelling and pain began in big toe and progressed upwards. Knows his triggers and tries to avoid.    Description:   Location: right foot and leg  Joint Swelling: YES  Redness: YES  Pain: YES  History of trauma to the area: no  Fevers: no    History:        Previous history of gout: YES    Family history of gout: no  Recent illness:  no  History of cancer: no  History of trauma to the area: no    Precipitating and/or Alleviating factors:    Diet-rich in: red meat  Alcohol use: no  Diuretic use: no    Progression of Symptoms: (better, worse, same): worse    Therapies tried and outcome: Ice with minor relief        Problem list, Medication list, Allergies, and Medical/Social/Surgical histories reviewed in EPIC and updated as appropriate.   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)     Presbycusis of both ears     Peripheral polyneuropathy     Past Surgical History:   Procedure Laterality Date     EP ABLATION / EP STUDIES       RELEASE CARPAL TUNNEL BILATERAL         Social History   Substance Use Topics     Smoking status: Former Smoker     Smokeless tobacco: Never Used     Alcohol use 1.2 oz/week     2 Standard drinks or equivalent per week     Family History   Problem Relation Age of Onset     Colon Cancer Father            OBJECTIVE:                                                    /88  Pulse 76  Resp 12   Wt 103.5 kg (228 lb 3.2 oz)  BMI 32.28 kg/m2  Body mass index is 32.28 kg/(m^2).   APPEARANCE: = healthy, alert and moderate distress  Resp effort = Calm regular breathing  Ext (edema) =  2+ and to mid shin  Musculsktl: extremities normal- right foot hot, red, swollen up to mid shin  Recent/Remote Memory = Alert and Oriented x 3  Mood/Affect =  mentation appears normal     ASSESSMENT/PLAN:                                                    1. Acute idiopathic gout of right foot  In case cannot afford colchicine, etodolac given.  - colchicine (COLCYRS) 0.6 MG tablet; TAKE 2 TABS BY MOUTH at onset,  AND REPEAT 1 TAB  AFTER 1 HOUR, then 1 tab twice daily until gone  Dispense: 10 tablet; Refill: 1  - predniSONE (DELTASONE) 20 MG tablet; Take 1 tablet (20 mg) by mouth 2 times daily  Dispense: 10 tablet; Refill: 0  - etodolac (LODINE) 500 MG tablet; Take 1 tablet (500 mg) by mouth 2 times daily for 5 days  Dispense: 10 tablet; Refill: 1    FUTURE APPOINTMENTS:       - Follow-up for annual visit or as needed    The following health maintenance items are reviewed in Epic and correct as of today:  Health Maintenance   Topic Date Due     FOOT EXAM Q1 YEAR  01/25/1946     EYE EXAM Q1 YEAR  01/25/1946     PHQ-2 Q1 YR  01/25/1958     TETANUS IMMUNIZATION (SYSTEM ASSIGNED)  01/25/1964     COLON CANCER SCREEN (SYSTEM ASSIGNED)  01/25/1996     ADVANCE DIRECTIVE PLANNING Q5 YRS  01/25/2001     PNEUMOCOCCAL (1 of 2 - PCV13) 01/25/2011     INFLUENZA VACCINE (1) 09/01/2018     FALL RISK ASSESSMENT  10/10/2018     A1C Q6 MO  11/30/2018     CREATININE Q1 YEAR  05/30/2019     LIPID MONITORING Q1 YEAR  05/30/2019     MICROALBUMIN Q1 YEAR  05/30/2019     TSH W/ FREE T4 REFLEX Q2 YEAR  05/30/2020     AORTIC ANEURYSM SCREENING (SYSTEM ASSIGNED)  Completed     HEPATITIS C SCREENING  Completed       KAJAL Bailon CNP  [facility]  Family Practice  [facility]  [phone]    For any issues my office # is  [phone]

## 2018-08-07 NOTE — MR AVS SNAPSHOT
"              After Visit Summary   2018    Abhay Taylor    MRN: [id number]           Patient Information     Date Of Birth          1945        Visit Information        Provider Department      2018 8:45 AM Debby Bella APRN CNP [facility]        Today's Diagnoses     Acute idiopathic gout of right foot    -  1       Follow-ups after your visit        Follow-up notes from your care team     Return if symptoms worsen or fail to improve.      Who to contact     If you have questions or need follow up information about today's clinic visit or your schedule please contact [facility] directly at [phone].  Normal or non-critical lab and imaging results will be communicated to you by DecisionDeskhart, letter or phone within 4 business days after the clinic has received the results. If you do not hear from us within 7 days, please contact the clinic through DecisionDeskhart or phone. If you have a critical or abnormal lab result, we will notify you by phone as soon as possible.  Submit refill requests through Tern or call your pharmacy and they will forward the refill request to us. Please allow 3 business days for your refill to be completed.          Additional Information About Your Visit        MyChart Information     Tern lets you send messages to your doctor, view your test results, renew your prescriptions, schedule appointments and more. To sign up, go to [email or web address] . Click on \"Log in\" on the left side of the screen, which will take you to the Welcome page. Then click on \"Sign up Now\" on the right side of the page.     You will be asked to enter the access code listed below, as well as some personal information. Please follow the directions to create your username and password.     Your access code is: [email or web address]  Expires: 2018  9:23 AM     Your access code will  in 90 days. If you need help or a new code, please call your [facility] clinic or " [phone].        Care EveryWhere ID     This is your Care EveryWhere ID. This could be used by other organizations to access your [facility] medical records  [id number]        Your Vitals Were     Pulse Respirations BMI (Body Mass Index)             76 12 32.28 kg/m2          Blood Pressure from Last 3 Encounters:   08/07/18 122/88   05/30/18 112/86   10/10/17 114/80    Weight from Last 3 Encounters:   08/07/18 103.5 kg (228 lb 3.2 oz)   05/30/18 98.9 kg (218 lb)   10/10/17 99.8 kg (220 lb)              Today, you had the following     No orders found for display         Today's Medication Changes          These changes are accurate as of 8/7/18  1:07 PM.  If you have any questions, ask your nurse or doctor.               Start taking these medicines.        Dose/Directions    colchicine 0.6 MG tablet   Commonly known as:  COLCYRS   Used for:  Acute idiopathic gout of right foot   Started by:  Debby Bella APRN CNP        TAKE 2 TABS BY MOUTH at onset,  AND REPEAT 1 TAB  AFTER 1 HOUR, then 1 tab twice daily until gone   Quantity:  10 tablet   Refills:  1       etodolac 500 MG tablet   Commonly known as:  LODINE   Used for:  Acute idiopathic gout of right foot   Started by:  Debby Bella APRN CNP        Dose:  500 mg   Take 1 tablet (500 mg) by mouth 2 times daily for 5 days   Quantity:  10 tablet   Refills:  1       predniSONE 20 MG tablet   Commonly known as:  DELTASONE   Used for:  Acute idiopathic gout of right foot   Started by:  Debby Bella APRN CNP        Dose:  20 mg   Take 1 tablet (20 mg) by mouth 2 times daily   Quantity:  10 tablet   Refills:  0            Where to get your medicines      These medications were sent to [facility] [address] [place] [facility] [address], [facility] [address]     Phone:  [phone]     colchicine 0.6 MG tablet    etodolac 500 MG tablet    predniSONE 20 MG tablet                 Primary Care Provider Office Phone # Fax #    Sean Madrid [phone] [address]  [facility] [address]        Equal Access to Services     FRANCESCA HONG : Chang mandy ku debo Soomaali, waaxda luqadaha, qaybta kaalmada adeegyada, ana holden laCarylaliyah yan. So [facility] [phone].    ATENCIÓN: Si habla español, tiene a muro disposición servicios gratuitos de asistencia lingüística. Llame al [phone].    We comply with applicable federal civil rights laws and [place] laws. We do not discriminate on the basis of race, color, national origin, age, disability, sex, sexual orientation, or gender identity.            Thank you!     Thank you for choosing [facility]  for your care. Our goal is always to provide you with excellent care. Hearing back from our patients is one way we can continue to improve our services. Please take a few minutes to complete the written survey that you may receive in the mail after your visit with us. Thank you!             Your Updated Medication List - Protect others around you: Learn how to safely use, store and throw away your medicines at www.disposemymeds.org.          This list is accurate as of 8/7/18  1:07 PM.  Always use your most recent med list.                   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    Encounter for medication refill       blood glucose monitoring lancets     300 each    Use to test blood sugar 3 times daily  or as directed.    Encounter for medication refill       blood glucose monitoring meter device kit     1 kit    Use to test blood sugar 3 times daily or as directed.    Encounter for medication refill       blood glucose monitoring test strip    ACCU-CHEK HELENA PLUS    3 Box    Use to test blood sugar 3 times daily or as directed.    Encounter for medication refill       colchicine 0.6 MG tablet    COLCYRS    10 tablet    TAKE 2 TABS BY MOUTH at onset,  AND REPEAT 1 TAB  AFTER 1 HOUR, then 1 tab twice daily until gone    Acute idiopathic gout of right foot       digoxin 125 MCG tablet    LANOXIN    5 tablet    Take 1 tablet (125 mcg) by mouth daily    History of stroke, Atrial fibrillation (H)       etodolac 500 MG tablet    LODINE    10 tablet    Take 1 tablet (500 mg) by mouth 2 times daily for 5 days    Acute idiopathic gout of right foot       fenofibrate 160 MG tablet     90 tablet    Take 1 tablet (160 mg) by mouth daily    History of stroke       gabapentin 100 MG capsule    NEURONTIN     Take 100 mg by mouth        LIPITOR PO      Take 20 mg by mouth daily        metFORMIN 500 MG 24 hr tablet    GLUCOPHAGE-XR    90 tablet    TAKE ONE TABLET BY MOUTH EVERY DAY WITH DINNER    Encounter for medication refill       metoprolol tartrate 25 MG tablet    LOPRESSOR    10 tablet    Take 1 tablet (25 mg) by mouth 2 times daily    History of stroke       predniSONE 20 MG tablet    DELTASONE    10 tablet    Take 1 tablet (20 mg) by mouth 2 times daily    Acute idiopathic gout of right foot       sotalol 80 MG tablet    BETAPACE          traMADol 50 MG tablet    ULTRAM     TK 1 T PO TID PRF 7 DAYS

## 2018-08-08 DIAGNOSIS — Z86.73 HISTORY OF STROKE: ICD-10-CM

## 2018-08-08 DIAGNOSIS — Z76.0 ENCOUNTER FOR MEDICATION REFILL: ICD-10-CM

## 2018-08-08 RX ORDER — FENOFIBRATE 160 MG/1
TABLET ORAL
Qty: 90 TABLET | Refills: 1 | Status: SHIPPED | OUTPATIENT
Start: 2018-08-08 | End: 2019-06-27

## 2018-08-08 RX ORDER — METFORMIN HCL 500 MG
TABLET, EXTENDED RELEASE 24 HR ORAL
Qty: 90 TABLET | Refills: 1 | Status: SHIPPED | OUTPATIENT
Start: 2018-08-08 | End: 2018-12-24

## 2018-08-08 RX ORDER — FENOFIBRATE 160 MG/1
160 TABLET ORAL DAILY
Qty: 90 TABLET | Refills: 1 | Status: SHIPPED | OUTPATIENT
Start: 2018-08-08 | End: 2018-12-24

## 2018-08-08 RX ORDER — METFORMIN HCL 500 MG
TABLET, EXTENDED RELEASE 24 HR ORAL
Qty: 90 TABLET | Refills: 1 | Status: SHIPPED | OUTPATIENT
Start: 2018-08-08 | End: 2019-08-22

## 2018-11-06 ENCOUNTER — MEDICAL CORRESPONDENCE (OUTPATIENT)
Dept: FAMILY MEDICINE | Facility: CLINIC | Age: 73
End: 2018-11-06

## 2018-11-24 ENCOUNTER — TRANSFERRED RECORDS (OUTPATIENT)
Dept: FAMILY MEDICINE | Facility: CLINIC | Age: 73
End: 2018-11-24

## 2018-11-24 LAB — COLOGUARD-ABSTRACT: NEGATIVE

## 2018-11-24 NOTE — LETTER
[place] [address] [place]  [address]  Phone: [phone]    November 27, 2018      Abhay Taylor  [address] [place] [address]              Dear Abhay,    The results from your recent visit showed that your  cologuard result was NEGATIVE.     Please recheck again in 3 years.        Sincerely,     Alicia Stevens M.D./emile    Results for orders placed or performed in visit on 11/24/18   ABSTRACT COLOGUARD-NO CHARGE   Result Value Ref Range    COLOGUARD-ABSTRACT Negative

## 2018-12-24 DIAGNOSIS — Z86.73 HISTORY OF STROKE: ICD-10-CM

## 2018-12-24 DIAGNOSIS — Z76.0 ENCOUNTER FOR MEDICATION REFILL: ICD-10-CM

## 2018-12-27 RX ORDER — FENOFIBRATE 160 MG/1
TABLET ORAL
Qty: 90 TABLET | Refills: 1 | Status: SHIPPED | OUTPATIENT
Start: 2018-12-27 | End: 2019-08-22

## 2018-12-27 RX ORDER — METFORMIN HCL 500 MG
TABLET, EXTENDED RELEASE 24 HR ORAL
Qty: 90 TABLET | Refills: 1 | Status: SHIPPED | OUTPATIENT
Start: 2018-12-27 | End: 2019-06-27

## 2019-04-30 ENCOUNTER — MEDICAL CORRESPONDENCE (OUTPATIENT)
Dept: FAMILY MEDICINE | Facility: CLINIC | Age: 74
End: 2019-04-30

## 2019-05-07 DIAGNOSIS — M10.071 ACUTE IDIOPATHIC GOUT OF RIGHT FOOT: ICD-10-CM

## 2019-05-07 RX ORDER — PREDNISONE 20 MG/1
20 TABLET ORAL 2 TIMES DAILY
Qty: 10 TABLET | Refills: 0 | Status: SHIPPED | OUTPATIENT
Start: 2019-05-07 | End: 2019-06-27

## 2019-05-16 ENCOUNTER — OFFICE VISIT (OUTPATIENT)
Dept: FAMILY MEDICINE | Facility: CLINIC | Age: 74
End: 2019-05-16

## 2019-05-16 VITALS
WEIGHT: 240.2 LBS | SYSTOLIC BLOOD PRESSURE: 118 MMHG | BODY MASS INDEX: 33.98 KG/M2 | DIASTOLIC BLOOD PRESSURE: 86 MMHG | OXYGEN SATURATION: 97 % | HEART RATE: 108 BPM

## 2019-05-16 DIAGNOSIS — G47.9 SLEEP DISORDER: Primary | ICD-10-CM

## 2019-05-16 DIAGNOSIS — E11.49 DM TYPE 2 CAUSING NEUROLOGICAL DISEASE (H): ICD-10-CM

## 2019-05-16 DIAGNOSIS — I48.20 CHRONIC ATRIAL FIBRILLATION (H): ICD-10-CM

## 2019-05-16 PROCEDURE — 99214 OFFICE O/P EST MOD 30 MIN: CPT | Performed by: FAMILY MEDICINE

## 2019-05-16 PROCEDURE — 36415 COLL VENOUS BLD VENIPUNCTURE: CPT | Performed by: FAMILY MEDICINE

## 2019-05-16 RX ORDER — INFLUENZA A VIRUS A/NEBRASKA/14/2019 (H1N1) ANTIGEN (MDCK CELL DERIVED, PROPIOLACTONE INACTIVATED), INFLUENZA A VIRUS A/DELAWARE/39/2019 (H3N2) ANTIGEN (MDCK CELL DERIVED, PROPIOLACTONE INACTIVATED), INFLUENZA B VIRUS B/SINGAPORE/INFTT-16-0610/2016 ANTIGEN (MDCK CELL DERIVED, PROPIOLACTONE INACTIVATED), INFLUENZA B VIRUS B/DARWIN/7/2019 ANTIGEN (MDCK CELL DERIVED, PROPIOLACTONE INACTIVATED) 15; 15; 15; 15 UG/.5ML; UG/.5ML; UG/.5ML; UG/.5ML
INJECTION, SUSPENSION INTRAMUSCULAR
Refills: 0 | COMMUNITY
Start: 2019-01-05 | End: 2020-01-13

## 2019-05-16 RX ORDER — AMITRIPTYLINE HYDROCHLORIDE 10 MG/1
10 TABLET ORAL AT BEDTIME
COMMUNITY
End: 2019-05-16

## 2019-05-16 RX ORDER — WARFARIN SODIUM 5 MG/1
1 TABLET ORAL EVERY 24 HOURS
COMMUNITY
Start: 2012-07-05 | End: 2019-06-27

## 2019-05-16 NOTE — PROGRESS NOTES
Patient with insomnia troubles. Wakes up about 2 or 3 am and can't get back to sleep. Currently taking Elavil which helps to fall asleep, but makes patient a little  groggy.   Needs medications for sleep that have been used long term. Trials of weaning have been very unsuccessful. Fully understands the desire for medication free sleep and sleep hygiene in general.    Problem(s) Oriented visit      ROS:  General and Resp. completed and negative except as noted above     HISTORY:   reports that he drinks about 1.2 oz of alcohol per week.   reports that he has quit smoking. He has never used smokeless tobacco.    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    EXAM:  APPEARANCE: = Relaxed and in no distress  Conj/Eyelids = noninjected and lids and lashes are without inflammation  PERRLA/Irises = Pupils Round Reactive to Light and Irisis without inflammation  Neck = No anterior or posterior adenopathy appreciated.  Thyroid = Not enlarged and no masses felt  Resp effort = Calm regular breathing  Breath Sounds = Good air movement with no rales or rhonchi in any lung fields  Heart Rate, Rythym, & sounds (no Murm)  = Regular rate and rythym with no S3, S4, or murmer appreciated.  Carotid Art's = Pulses full and equal and no bruits appreciated  Abdomen = Soft, nontender, no masses, & bowel sounds in all quadrants  Liver/Spleen =  Normal span and no splenomegaly noted  Digits and Nails = FROM in all finger joints, no nail dystrophy  Ext (edema) = No pretibial edema noted or elsewhere  Musculsktl =  Strength and ROM of major joints are within normal limits  SKIN = absent significant rashes or lesions   Recent/Remote Memory = Alert and Oriented x 3  Mood/Affect = Cooperative and interested      Assessment/Plan:  Abhay was seen today for sleep problem.    Diagnoses and all orders for this visit:    Sleep disorder  We discussed sleep hygiene, stimulus control and sleep restriction. He was advised to avoid caffeine within 6 hours of bed, avoid alcohol at night, avoid late meals and late exercise, avoid late physical activity. He was advised to keep his sleep environment cool, dark and quiet. He was advised to try to keep a regular sleep-wake schedule, which he does for the most part, and avoid naps, which he does. For stimulus control, he was advised to avoid being in bed for more than 20 minutes if unable to sleep. If he does get out of bed, he should keep the lights dim, read a book that is not particularly entertaining, and return to sleep if he starts feeling drowsy.    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  Failure to comply with instructions regarding diabetes will lead to a greater chance of poor diabetic control and therefore a greater chance of diabetes related complications such as CAD, CVA, PVD, and retinopathy/neuropathy/nephropathy.  Based on level of diabetes control: testing frequency ONE TIME PER DAY  -     Hemoglobin A1C ([facility])  -     Comp. Metabolic Panel (14) ([facility])    Chronic atrial fibrillation (H)    on blood thinner and tolerating it  "well.      COUNSELING:   reports that he has quit smoking. He has never used smokeless tobacco.    Estimated body mass index is 33.98 kg/m  as calculated from the following:    Height as of 7/13/15: 1.791 m (5' 10.5\").    Weight as of this encounter: 109 kg (240 lb 3.2 oz).       Appropriate preventive services were discussed with this patient, including applicable screening as appropriate for cardiovascular disease, diabetes, osteopenia/osteoporosis, and glaucoma.  As appropriate for age/gender, discussed screening for colorectal cancer, prostate cancer, breast cancer, and cervical cancer. Checklist reviewing preventive services available has been given to the patient.    Reviewed patients plan of care and provided an AVS. The Basic Care Plan (routine screening as documented in Health Maintenance) for Abhay meets the Care Plan requirement. This Care Plan has been established and reviewed with the  Patient.      The following health maintenance items are reviewed in Epic and correct as of today:  Health Maintenance   Topic Date Due     FOOT EXAM Q1 YEAR  01/25/1946     EYE EXAM Q1 YEAR  01/25/1946     DTAP/TDAP/TD IMMUNIZATION (1 - Tdap) 01/25/1971     ZOSTER IMMUNIZATION (1 of 2) 01/25/1996     ADVANCE DIRECTIVE PLANNING Q5 YRS  01/25/2001     MEDICARE ANNUAL WELLNESS VISIT  01/25/2011     PNEUMOCOCCAL IMMUNIZATION 65+ LOW/MEDIUM RISK (1 of 2 - PCV13) 01/25/2011     FALL RISK ASSESSMENT  10/10/2018     A1C Q6 MO  11/30/2018     PHQ-2  01/01/2019     CREATININE Q1 YEAR  05/30/2019     LIPID MONITORING Q1 YEAR  05/30/2019     MICROALBUMIN Q1 YEAR  05/30/2019     INFLUENZA VACCINE (Season Ended) 09/01/2019     TSH W/ FREE T4 REFLEX Q2 YEAR  05/30/2020     FIT-DNA (Cologuard) Q3 YR  11/24/2021     AORTIC ANEURYSM SCREENING (SYSTEM ASSIGNED)  Completed     HEPATITIS C SCREENING  Completed     IPV IMMUNIZATION  Aged Out     MENINGITIS IMMUNIZATION  Aged Out       Sean Madrid  [facility]  For any " issues my office # is [phone]

## 2019-05-16 NOTE — LETTER
[facility]  [address] [place]  [address]  Phone: [phone]    May 23, 2019      Abhay Taylor  [address] [place] [id number]              Dear Abhay,    I am writing to report that your included test results are within expected ranges. I do not suggest that we make any changes at this time.         Sincerely,     Sean Madrid M.D.    Results for orders placed or performed in visit on 05/16/19   Hemoglobin A1C ([facility])   Result Value Ref Range    Hemoglobin A1C 6.1 (H) 4.8 - 5.6 %    Narrative    Performed at:  01 - [facility] [address], [place], [place]  [id number]  : Jake Miller MD, Phone:  [phone]   Comp. Metabolic Panel (14) ([facility])   Result Value Ref Range    Glucose 85 65 - 99 mg/dL    Urea Nitrogen 27 8 - 27 mg/dL    Creatinine 1.33 (H) 0.76 - 1.27 mg/dL    eGFR If NonAfricn Am 53 (L) >59 mL/min/1.73    eGFR If Africn Am 61 >59 mL/min/1.73    BUN/Creatinine Ratio 20 10 - 24    Sodium 143 134 - 144 mmol/L    Potassium 4.9 3.5 - 5.2 mmol/L    Chloride 101 96 - 106 mmol/L    Total CO2 23 20 - 29 mmol/L    Calcium 9.9 8.6 - 10.2 mg/dL    Protein Total 6.8 6.0 - 8.5 g/dL    Albumin 4.4 3.5 - 4.8 g/dL    Globulin, Total 2.4 1.5 - 4.5 g/dL    A/G Ratio 1.8 1.2 - 2.2    Bilirubin Total 0.5 0.0 - 1.2 mg/dL    Alkaline Phosphatase 54 39 - 117 IU/L    AST 30 0 - 40 IU/L    ALT 35 0 - 44 IU/L    Narrative    Performed at:  01 - [facility] [address], [place], [place]  [id number]  : Jake Miller MD, Phone:  [phone]

## 2019-05-17 ENCOUNTER — TELEPHONE (OUTPATIENT)
Dept: FAMILY MEDICINE | Facility: CLINIC | Age: 74
End: 2019-05-17

## 2019-05-17 LAB — HBA1C MFR BLD: 6.1 % (ref 4.8–5.6)

## 2019-05-17 NOTE — TELEPHONE ENCOUNTER
Received fax from [employer] with request for PA to be done for Amitriptyline. Submitted through Rem[employer]. Will wait for response.

## 2019-05-18 LAB
ALBUMIN SERPL-MCNC: 4.4 G/DL (ref 3.5–4.8)
ALBUMIN/GLOB SERPL: 1.8 {RATIO} (ref 1.2–2.2)
ALP SERPL-CCNC: 54 IU/L (ref 39–117)
ALT SERPL-CCNC: 35 IU/L (ref 0–44)
AST SERPL-CCNC: 30 IU/L (ref 0–40)
BILIRUB SERPL-MCNC: 0.5 MG/DL (ref 0–1.2)
BUN SERPL-MCNC: 27 MG/DL (ref 8–27)
BUN/CREATININE RATIO: 20 (ref 10–24)
CALCIUM SERPL-MCNC: 9.9 MG/DL (ref 8.6–10.2)
CHLORIDE SERPLBLD-SCNC: 101 MMOL/L (ref 96–106)
CREAT SERPL-MCNC: 1.33 MG/DL (ref 0.76–1.27)
EGFR IF AFRICN AM: 61 ML/MIN/1.73
EGFR IF NONAFRICN AM: 53 ML/MIN/1.73
GLOBULIN, TOTAL: 2.4 G/DL (ref 1.5–4.5)
GLUCOSE SERPL-MCNC: 85 MG/DL (ref 65–99)
POTASSIUM SERPL-SCNC: 4.9 MMOL/L (ref 3.5–5.2)
PROT SERPL-MCNC: 6.8 G/DL (ref 6–8.5)
SODIUM SERPL-SCNC: 143 MMOL/L (ref 134–144)
TOTAL CO2: 23 MMOL/L (ref 20–29)

## 2019-05-21 NOTE — TELEPHONE ENCOUNTER
Received fax from [employer] with approval for Amitriptyline 25 mg. Approval is good until 05/16/2021.  Called Carlos to inform of approval.

## 2019-06-27 ENCOUNTER — OFFICE VISIT (OUTPATIENT)
Dept: FAMILY MEDICINE | Facility: CLINIC | Age: 74
End: 2019-06-27

## 2019-06-27 VITALS
SYSTOLIC BLOOD PRESSURE: 110 MMHG | HEART RATE: 79 BPM | WEIGHT: 237 LBS | OXYGEN SATURATION: 97 % | TEMPERATURE: 98.4 F | DIASTOLIC BLOOD PRESSURE: 80 MMHG | BODY MASS INDEX: 33.53 KG/M2

## 2019-06-27 DIAGNOSIS — M10.071 ACUTE IDIOPATHIC GOUT OF RIGHT FOOT: ICD-10-CM

## 2019-06-27 PROCEDURE — 36415 COLL VENOUS BLD VENIPUNCTURE: CPT | Performed by: FAMILY MEDICINE

## 2019-06-27 PROCEDURE — 99213 OFFICE O/P EST LOW 20 MIN: CPT | Performed by: FAMILY MEDICINE

## 2019-06-27 RX ORDER — COLCHICINE 0.6 MG/1
TABLET ORAL
Qty: 10 TABLET | Refills: 1 | Status: CANCELLED | OUTPATIENT
Start: 2019-06-27

## 2019-06-27 RX ORDER — COLCHICINE 0.6 MG/1
TABLET ORAL
Qty: 10 TABLET | Refills: 11 | Status: SHIPPED | OUTPATIENT
Start: 2019-06-27 | End: 2019-07-11

## 2019-06-27 NOTE — PROGRESS NOTES
SUBJECTIVE:  Chief Complaint   Patient presents with     Arthritis     Right foot     Abhay Taylor is a 74 year old male presents with a chief complaint of right foot pain, tenderness and redness.  The injury occurred 2 day(s) ago.   T  Pain exacerbated by walking.  Relieved by nothing.  He treated it initially with old gout medication. This is not the first time this type of injury has occurred to this patient.     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      Hypertension      Peripheral polyneuropathy 9/9/2016     Presbycusis of both ears 9/9/2016    severe     Current Outpatient Medications   Medication Sig Dispense Refill     ACE/ARB NOT PRESCRIBED, INTENTIONAL, Please choose reason not prescribed, below (Patient not taking: Reported on 8/7/2018)       Alcohol Swabs PADS 1 each 3 times daily 300 each 3     amitriptyline (ELAVIL) 25 MG tablet TK 1 T PO QHS 90 tablet 3     apixaban ANTICOAGULANT (ELIQUIS) 5 MG tablet Take 5 mg by mouth       ASPIRIN EC PO Take 81 mg by mouth daily       Atorvastatin Calcium (LIPITOR PO) Take 20 mg by mouth daily        blood glucose calibration (ACCU-CHEK HELENA) solution Use to calibrate blood glucose monitor as needed as directed. 1 Bottle 3     blood glucose monitoring (ACCU-CHEK HELENA PLUS) meter device kit Use to test blood sugar 3 times daily or as directed. 1 kit 0     blood glucose monitoring (ACCU-CHEK HELNEA PLUS) test strip Use to test blood sugar 3 times daily or as directed. 3 Box 3     blood glucose monitoring (SOFTCLIX) lancets Use to test blood sugar 3 times daily or as directed. 300 each 3     colchicine (COLCYRS) 0.6 MG tablet TAKE 2 TABS BY MOUTH at onset,  AND REPEAT 1 TAB  AFTER 1 HOUR, then 1 tab twice daily until gone (Patient not  taking: Reported on 5/16/2019) 10 tablet 1     digoxin (LANOXIN) 125 MCG tablet Take 1 tablet (125 mcg) by mouth daily 5 tablet 0     etodolac (LODINE) 500 MG tablet Take 1 tablet (500 mg) by mouth 2 times daily for 5 days 10 tablet 1     fenofibrate (TRIGLIDE/LOFIBRA) 160 MG tablet TAKE 1 TABLET EVERY DAY 90 tablet 1     fenofibrate 160 MG tablet TAKE 1 TABLET (160 MG) BY MOUTH DAILY 90 tablet 1     FLUCELVAX QUADRIVALENT SUSP ADM 0.5ML IM UTD  0     gabapentin (NEURONTIN) 100 MG capsule Take 100 mg by mouth       metFORMIN (GLUCOPHAGE-XR) 500 MG 24 hr tablet TAKE 1 TABLET EVERY DAY WITH DINNER 90 tablet 1     metFORMIN (GLUCOPHAGE-XR) 500 MG 24 hr tablet TAKE ONE TABLET BY MOUTH EVERY DAY WITH DINNER 90 tablet 1     metoprolol (LOPRESSOR) 25 MG tablet Take 1 tablet (25 mg) by mouth 2 times daily (Patient not taking: Reported on 5/16/2019) 10 tablet 0     predniSONE (DELTASONE) 20 MG tablet Take 20 mg by mouth 2 times daily. (Patient not taking: Reported on 5/16/2019.) 10 tablet 0     sotalol (BETAPACE) 80 MG tablet        traMADol (ULTRAM) 50 MG tablet TK 1 T PO TID PRF 7 DAYS  0     warfarin (COUMADIN) 5 MG tablet Take 1 tablet by mouth every 24 hours       Social History     Tobacco Use     Smoking status: Former Smoker     Smokeless tobacco: Never Used   Substance Use Topics     Alcohol use: Yes     Alcohol/week: 1.2 oz     Types: 2 Standard drinks or equivalent per week     ROS:  CONSTITUTIONAL:NEGATIVE for fever, chills, change in weight  INTEGUMENTARY/SKIN: NEGATIVE for bruising   EYES: NEGATIVE for vision changes or irritation    EXAM:   /80   Pulse 79   Temp 98.4  F (36.9  C) (Temporal)   Wt 107.5 kg (237 lb)   SpO2 97%   BMI 33.53 kg/m    Gen: mild distress, over weight and healthy,alert,no distress  Extremity: R foot has erythema, swelling and decreased ROM around the 1st mtp.   There is not compromise to the distal circulation.  Pulses are +2 and CRT is brisk  NEURO: Normal strength and tone,  sensory exam grossly normal, mentation intact and speech normal    X-RAY was not done.    ASSESSMENT:   1. Acute idiopathic gout of right foot  Refilled med   Declined chronic tx    Pt instructed to come back to the clinic for worsening sx    - Uric Acid  Serum ([facility])  - Comp. Metabolic Panel (14) ([facility])  - colchicine (COLCYRS) 0.6 MG tablet; TAKE 2 TABS BY MOUTH at onset,  AND REPEAT 1 TAB  AFTER 1 HOUR, then 1 tab twice daily until gone  Dispense: 10 tablet; Refill: 11  - VENOUS COLLECTION

## 2019-06-28 LAB
ALBUMIN SERPL-MCNC: 4.4 G/DL (ref 3.5–4.8)
ALBUMIN/GLOB SERPL: 1.7 {RATIO} (ref 1.2–2.2)
ALP SERPL-CCNC: 105 IU/L (ref 39–117)
ALT SERPL-CCNC: 22 IU/L (ref 0–44)
AST SERPL-CCNC: 24 IU/L (ref 0–40)
BILIRUB SERPL-MCNC: 0.4 MG/DL (ref 0–1.2)
BUN SERPL-MCNC: 20 MG/DL (ref 8–27)
BUN/CREATININE RATIO: 19 (ref 10–24)
CALCIUM SERPL-MCNC: 9.7 MG/DL (ref 8.6–10.2)
CHLORIDE SERPLBLD-SCNC: 105 MMOL/L (ref 96–106)
CREAT SERPL-MCNC: 1.04 MG/DL (ref 0.76–1.27)
EGFR IF AFRICN AM: 82 ML/MIN/1.73
EGFR IF NONAFRICN AM: 71 ML/MIN/1.73
GLOBULIN, TOTAL: 2.6 G/DL (ref 1.5–4.5)
GLUCOSE SERPL-MCNC: 103 MG/DL (ref 65–99)
POTASSIUM SERPL-SCNC: 4.5 MMOL/L (ref 3.5–5.2)
PROT SERPL-MCNC: 7 G/DL (ref 6–8.5)
SODIUM SERPL-SCNC: 143 MMOL/L (ref 134–144)
TOTAL CO2: 27 MMOL/L (ref 20–29)
URATE SERPL-MCNC: 8.4 MG/DL (ref 3.7–8.6)

## 2019-07-11 ENCOUNTER — OFFICE VISIT (OUTPATIENT)
Dept: FAMILY MEDICINE | Facility: CLINIC | Age: 74
End: 2019-07-11

## 2019-07-11 DIAGNOSIS — M1A.09X0 IDIOPATHIC CHRONIC GOUT OF MULTIPLE SITES WITHOUT TOPHUS: ICD-10-CM

## 2019-07-11 PROCEDURE — 99214 OFFICE O/P EST MOD 30 MIN: CPT | Performed by: FAMILY MEDICINE

## 2019-07-11 RX ORDER — COLCHICINE 0.6 MG/1
TABLET ORAL
Qty: 30 TABLET | Refills: 11 | Status: SHIPPED | OUTPATIENT
Start: 2019-07-11 | End: 2020-02-25

## 2019-07-11 RX ORDER — ALLOPURINOL 100 MG/1
100 TABLET ORAL DAILY
Qty: 30 TABLET | Refills: 1 | Status: SHIPPED | OUTPATIENT
Start: 2019-07-11 | End: 2020-01-21

## 2019-08-04 VITALS — DIASTOLIC BLOOD PRESSURE: 78 MMHG | HEART RATE: 68 BPM | TEMPERATURE: 99 F | SYSTOLIC BLOOD PRESSURE: 128 MMHG

## 2019-08-04 NOTE — PROGRESS NOTES
Prashant is here in follow up for his gout.   He has had some flare ups and has been on intermittent colchicine for this.   No prior hx of kindey stones or tophi.   Diabetic.   Takes BP meds, anticoagulation for afib.    ROS otherwise negative including sleep, neuro, CV, skin or GI     /78 (BP Location: Right arm, Patient Position: Sitting, Cuff Size: Adult Large)   Pulse 68   Temp 99  F (37.2  C)     GENERAL: healthy, alert and no distress  EYES: Eyes grossly normal to inspection, PERRL and conjunctivae and sclerae normal  NECK: no adenopathy, no asymmetry, masses, or scars and thyroid normal to palpation  RESP: lungs clear to auscultation - no rales, rhonchi or wheezes  CV: irreg irreg  MS: no gross musculoskeletal defects noted, no edema  SKIN: no suspicious lesions or rashes  NEURO: Normal strength and tone, mentation intact and speech normal  PSYCH: mentation appears normal, affect normal/bright    ASSESSMENT:  1. Idiopathic chronic gout of multiple sites without tophus  Start allopurinol 100 mg daily   Check labs in 4 weeks.   Colchicine use during titration discussed  - colchicine (COLCYRS) 0.6 MG tablet; 1 tablet daily x 10 days, then daily prn gout flares  Dispense: 30 tablet; Refill: 11  - allopurinol (ZYLOPRIM) 100 MG tablet; Take 1 tablet (100 mg) by mouth daily  Dispense: 30 tablet; Refill: 1  - Uric Acid  Serum ([facility]); Standing  - Comp. Metabolic Panel (14) ([facility]); Standing

## 2019-08-22 DIAGNOSIS — Z76.0 ENCOUNTER FOR MEDICATION REFILL: ICD-10-CM

## 2019-08-22 DIAGNOSIS — Z86.73 HISTORY OF STROKE: ICD-10-CM

## 2019-08-22 RX ORDER — METFORMIN HCL 500 MG
TABLET, EXTENDED RELEASE 24 HR ORAL
Qty: 90 TABLET | Refills: 1 | Status: SHIPPED | OUTPATIENT
Start: 2019-08-22 | End: 2020-01-21

## 2019-08-22 RX ORDER — FENOFIBRATE 160 MG/1
TABLET ORAL
Qty: 90 TABLET | Refills: 1 | Status: SHIPPED | OUTPATIENT
Start: 2019-08-22 | End: 2020-01-21

## 2019-08-23 DIAGNOSIS — Z76.0 ENCOUNTER FOR MEDICATION REFILL: ICD-10-CM

## 2019-08-23 DIAGNOSIS — E11.49 DM TYPE 2 CAUSING NEUROLOGICAL DISEASE (H): Primary | ICD-10-CM

## 2019-08-25 RX ORDER — LANCETS
EACH MISCELLANEOUS
Qty: 300 EACH | Refills: 3 | Status: SHIPPED | OUTPATIENT
Start: 2019-08-25 | End: 2024-07-23

## 2019-08-25 RX ORDER — BLOOD GLUCOSE CONTROL HIGH,LOW
EACH MISCELLANEOUS
Qty: 1 BOTTLE | Refills: 3 | Status: SHIPPED | OUTPATIENT
Start: 2019-08-25 | End: 2024-07-23

## 2019-08-25 RX ORDER — BLOOD PRESSURE TEST KIT
1 KIT MISCELLANEOUS 3 TIMES DAILY
Qty: 300 EACH | Refills: 3 | Status: SHIPPED | OUTPATIENT
Start: 2019-08-25 | End: 2024-07-23

## 2019-08-25 RX ORDER — BLOOD-GLUCOSE METER
EACH MISCELLANEOUS
Qty: 1 KIT | Refills: 0 | Status: SHIPPED | OUTPATIENT
Start: 2019-08-25 | End: 2024-07-23

## 2019-09-19 DIAGNOSIS — M10.071 ACUTE IDIOPATHIC GOUT OF RIGHT FOOT: ICD-10-CM

## 2019-09-19 RX ORDER — PREDNISONE 20 MG/1
TABLET ORAL
Qty: 10 TABLET | Refills: 0 | Status: SHIPPED | OUTPATIENT
Start: 2019-09-19 | End: 2020-01-21

## 2019-11-21 ENCOUNTER — OFFICE VISIT (OUTPATIENT)
Dept: FAMILY MEDICINE | Facility: CLINIC | Age: 74
End: 2019-11-21

## 2019-11-21 VITALS
BODY MASS INDEX: 33.18 KG/M2 | WEIGHT: 237 LBS | HEART RATE: 85 BPM | HEIGHT: 71 IN | OXYGEN SATURATION: 96 % | SYSTOLIC BLOOD PRESSURE: 122 MMHG | RESPIRATION RATE: 16 BRPM | DIASTOLIC BLOOD PRESSURE: 78 MMHG

## 2019-11-21 DIAGNOSIS — Z01.818 PREOP GENERAL PHYSICAL EXAM: Primary | ICD-10-CM

## 2019-11-21 DIAGNOSIS — I48.0 PAROXYSMAL ATRIAL FIBRILLATION (H): ICD-10-CM

## 2019-11-21 DIAGNOSIS — I10 ESSENTIAL HYPERTENSION: ICD-10-CM

## 2019-11-21 DIAGNOSIS — E11.49 DM TYPE 2 CAUSING NEUROLOGICAL DISEASE (H): ICD-10-CM

## 2019-11-21 DIAGNOSIS — G62.9 PERIPHERAL POLYNEUROPATHY: ICD-10-CM

## 2019-11-21 DIAGNOSIS — I63.10 CEREBROVASCULAR ACCIDENT (CVA) DUE TO EMBOLISM OF PRECEREBRAL ARTERY (H): ICD-10-CM

## 2019-11-21 DIAGNOSIS — H53.462 HOMONYMOUS BILATERAL FIELD DEFECTS OF LEFT SIDE: ICD-10-CM

## 2019-11-21 PROBLEM — I48.91 ATRIAL FIBRILLATION (H): Status: ACTIVE | Noted: 2019-11-21

## 2019-11-21 PROCEDURE — 99215 OFFICE O/P EST HI 40 MIN: CPT | Performed by: FAMILY MEDICINE

## 2019-11-21 ASSESSMENT — MIFFLIN-ST. JEOR: SCORE: 1842.15

## 2019-11-21 NOTE — PROGRESS NOTES
[facility]  [address] [place] [id number] [phone]  Dept: [phone]    PRE-OP EVALUATION:  Today's date: 2019    Abahy Taylor (: 1945) presents for pre-operative evaluation assessment as requested by Dr. Beauchapm.  He requires evaluation and anesthesia risk assessment prior to undergoing surgery/procedure for treatment of Left cataract .    Proposed Surgery/ Procedure: Lt cataract  Date of Surgery/ Procedure: 19 and right approximately 10 daysl later.  Time of Surgery/ Procedure: 6am  Hospital/Surgical Facility: [facility]    Primary Physician: Sean Madrid  Type of Anesthesia Anticipated: to be determined    Patient has a Health Care Directive or Living Will:  NO    1. YES - Do you have a history of heart attack, stroke, stent, bypass or surgery on an artery in the head, neck, heart or legs?   2. YES - Do you ever have any pain or discomfort in your chest?  3. YES - Do you have a history of  Heart Failure?  4. YES - Are you troubled by shortness of breath when: walking on the level, up a slight hill or at night?  5. NO - Do you currently have a cold, bronchitis or other respiratory infection?  6. NO - Do you have a cough, shortness of breath or wheezing?  7. NO - Do you sometimes get pains in the calves of your legs when you walk?  8. NO - Do you or anyone in your family have previous history of blood clots?  9. NO - Do you or does anyone in your family have a serious bleeding problem such as prolonged bleeding following surgeries or cuts?  10. NO - Have you ever had problems with anemia or been told to take iron pills?  11. NO - Have you had any abnormal blood loss such as black, tarry or bloody stools, or abnormal vaginal bleeding?  12. NO - Have you ever had a blood transfusion?  13. NO - Have you or any of your relatives ever had problems with anesthesia?  14. NO - Do you have sleep apnea, excessive snoring or daytime drowsiness?  15. NO - Do  you have any prosthetic heart valves?  16. NO - Do you have prosthetic joints?  17. NO - Is there any chance that you may be pregnant?      HPI:     HPI related to upcoming procedure: Bilateral cataracts now ready for intervention.  Last stroke resulted in most of vision on the left and keyhole vision on the right!        See problem list for active medical problems.  Problems all longstanding and stable, except as noted/documented.  See ROS for pertinent symptoms related to these conditions.    A-FIB - Patient has a longstanding history of chronic A-fib currently on rate and rhythm control. Current treatment regimen includes Apixaban for stroke prevention and denies significant symptoms of lightheadedness, palpitations or dyspnea.     DIABETES - Patient has a longstanding history of DiabetesType Type II . Patient is being treated with oral agents and denies significant side effects. Control has been good. Complicating factors include but are not limited to: neuropathy.     HYPERTENSION - Patient has longstanding history of HTN , currently denies any symptoms referable to elevated blood pressure. Specifically denies chest pain, palpitations, dyspnea, orthopnea, PND or peripheral edema. Blood pressure readings have been in normal range. Current medication regimen is as listed below. Patient denies any side effects of medication.       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      Priority: Medium     Hypertension      Priority: Medium     DM type 2 causing neurological  disease (H)      Priority: Medium      Past Medical History:   Diagnosis Date     Cerebrovascular accident (CVA) (H)     3 strokes over 2014-16     Chronic atrial fibrillation     failed ablation X 3     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)      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      Hypertension      Peripheral polyneuropathy 9/9/2016     Presbycusis of both ears 9/9/2016    severe     Past Surgical History:   Procedure Laterality Date     EP ABLATION / EP STUDIES       RELEASE CARPAL TUNNEL BILATERAL       Current Outpatient Medications   Medication Sig Dispense Refill     ACE/ARB NOT PRESCRIBED, INTENTIONAL, Please choose reason not prescribed, below       Alcohol Swabs PADS 1 each 3 times daily 300 each 3     allopurinol (ZYLOPRIM) 100 MG tablet Take 1 tablet (100 mg) by mouth daily 30 tablet 1     amitriptyline (ELAVIL) 25 MG tablet TK 1 T PO QHS 90 tablet 3     apixaban ANTICOAGULANT (ELIQUIS) 5 MG tablet Take 5 mg by mouth       ASPIRIN EC PO Take 81 mg by mouth daily       Atorvastatin Calcium (LIPITOR PO) Take 20 mg by mouth daily        blood glucose (ACCU-CHEK HELENA PLUS) test strip Use to test blood sugar 3 times daily or as directed. 3 Box 3     blood glucose calibration (ACCU-CHEK HELENA) solution Use to calibrate blood glucose monitor as needed as directed. 1 Bottle 3     blood glucose monitoring (ACCU-CHEK HELENA PLUS) meter device kit Use to test blood sugar 3 times daily or as directed. 1 kit 0     blood glucose monitoring (SOFTCLIX) lancets Use to test blood sugar 3 times daily or as directed. 300 each 3     colchicine (COLCYRS) 0.6 MG tablet 1 tablet daily x 10 days, then daily prn gout flares 30 tablet 11     digoxin (LANOXIN) 125 MCG tablet Take 1 tablet (125 mcg) by mouth daily 5 tablet 0     fenofibrate (TRIGLIDE/LOFIBRA) 160 MG tablet TAKE 1 TABLET EVERY DAY 90 tablet 1     gabapentin (NEURONTIN) 100 MG capsule Take 100 mg by mouth       metFORMIN (GLUCOPHAGE-XR) 500 MG 24  "hr tablet TAKE 1 TABLET EVERY DAY WITH DINNER 90 tablet 1     metoprolol (LOPRESSOR) 25 MG tablet Take 1 tablet (25 mg) by mouth 2 times daily 10 tablet 0     sotalol (BETAPACE) 80 MG tablet        FLUCELVAX QUADRIVALENT SUSP ADM 0.5ML IM UTD  0     predniSONE (DELTASONE) 20 MG tablet TAKE 1 TABLET(20MG) BY MOUTH TWO(2) TIMES DAILY (Patient not taking: Reported on 11/21/2019) 10 tablet 0     traMADol (ULTRAM) 50 MG tablet TK 1 T PO TID PRF 7 DAYS  0     OTC products: None, except as noted above    No Known Allergies   Latex Allergy: NO    Social History     Tobacco Use     Smoking status: Former Smoker     Smokeless tobacco: Never Used   Substance Use Topics     Alcohol use: Yes     Alcohol/week: 2.0 standard drinks     Types: 2 Standard drinks or equivalent per week     History   Drug Use Not on file       REVIEW OF SYSTEMS:   Constitutional, neuro, ENT, endocrine, pulmonary, cardiac, gastrointestinal, genitourinary, musculoskeletal, integument and psychiatric systems are negative, except as otherwise noted.    EXAM:   /78   Pulse 85   Resp 16   Ht 1.803 m (5' 11\")   Wt 107.5 kg (237 lb)   SpO2 96%   BMI 33.05 kg/m      GENERAL APPEARANCE: healthy, alert and no distress     EYES: EOMI,  PERRL     HENT: ear canals and TM's normal and nose and mouth without ulcers or lesions     NECK: no adenopathy, no asymmetry, masses, or scars and thyroid normal to palpation     RESP: lungs clear to auscultation - no rales, rhonchi or wheezes     CV: irregularly irregular rhythm     ABDOMEN:  soft, nontender, no HSM or masses and bowel sounds normal     MS: extremities normal- no gross deformities noted, no evidence of inflammation in joints, FROM in all extremities.     SKIN: no suspicious lesions or rashes     NEURO: Normal strength and tone, sensory exam grossly normal, mentation intact and speech normal     PSYCH: mentation appears normal. and affect normal/bright     LYMPHATICS: No cervical " adenopathy    DIAGNOSTICS:   No labs or EKG required for low risk surgery (cataract, skin procedure, breast biopsy, etc)    Recent Labs   Lab Test 06/27/19  1453 05/16/19  1641 05/30/18  1000  05/30/18  0948  10/10/17  1432  07/13/15  1130   HGB  --   --   --   --  17.4  --  17.1  --  16.9   PLT  --   --   --   --  139*  --  150  --  123*   INR  --   --   --   --   --   --   --   --  1.05    143  --    < >  --    < >  --   --  143   POTASSIUM 4.5 4.9  --    < >  --    < >  --   --  4.2   CR 1.04 1.33*  --    < >  --    < >  --    < > 0.89   A1C  --  6.1* 6.1*  --   --    < >  --    < > 5.8    < > = values in this interval not displayed.        IMPRESSION:   Reason for surgery/procedure: Bilateral cataracts with known vision loss      The proposed surgical procedure is considered LOW risk.    REVISED CARDIAC RISK INDEX  The patient has the following serious cardiovascular risks for perioperative complications such as (MI, PE, VFib and 3  AV Block):  No serious cardiac risks  INTERPRETATION: 1 risks: Class II (low risk - 0.9% complication rate)    The patient has the following additional risks for perioperative complications:  No identified additional risks      ICD-10-CM    1. Preop general physical exam Z01.818    2. Cerebrovascular accident (CVA) due to embolism of precerebral artery (H) I63.10    3. 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 Peripheral polyneuropathy G62.9    5. Paroxysmal atrial fibrillation (H) I48.0    6. Essential hypertension I10    7. 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    APPROVAL GIVEN to proceed with proposed procedure, without further diagnostic evaluation       Signed Electronically by: Sean Madrid MD    Copy of this evaluation report is provided to requesting physician.    [facility] Preop  Guidelines    Revised Cardiac Risk Index

## 2019-11-22 NOTE — PROGRESS NOTES
11/21/19 faxed preop to [facility] @ [phone]    Pierre Roberts,   [facility]  [phone]

## 2019-11-25 NOTE — PROGRESS NOTES
11/22/19 Faxed preop and labs to [facility] @ [phone].    Pierre Roberts,   [facility]  [phone]

## 2020-01-09 NOTE — PROGRESS NOTES
"Subjective     Abhay Taylor is a 74 year old male who presents to clinic today for the following health issues:    HPI   Diabetes Follow-up      How often are you checking your blood sugar? { :793587}    What concerns do you have today about your diabetes? { :387845::\"None\"}     Do you have any of these symptoms? (Select all that apply)  { :754425}     Have you had a diabetic eye exam in the last 12 months? { :091000}    BP Readings from Last 2 Encounters:   11/21/19 122/78   07/11/19 128/78     Hemoglobin A1C (%)   Date Value   05/16/2019 6.1 (H)   05/30/2018 6.1 (H)     LDL Cholesterol Calculated (mg/dL)   Date Value   05/30/2018 80   05/26/2016 39       Diabetes Management Resources      Concern - SOB  Onset: ***    Description:   ***    Intensity: {.:249896}    Progression of Symptoms:  {.:862061}    Accompanying Signs & Symptoms:  ***    Previous history of similar problem:   ***    Precipitating factors:   Worsened by: ***    Alleviating factors:  Improved by: ***    Therapies Tried and outcome: ***    {HIST REVIEW/ LINKS 2 (Optional):802548}    Reviewed and updated as needed this visit by Provider         Review of Systems   {ROS COMP (Optional):654093}      Objective    There were no vitals taken for this visit.  There is no height or weight on file to calculate BMI.  Physical Exam   {Exam List (Optional):469397}    {Diagnostic Test Results (Optional):337178::\"Diagnostic Test Results:\",\"Labs reviewed in Epic\"}        {PROVIDER CHARTING PREFERENCE:726448}    "

## 2020-01-13 ENCOUNTER — OFFICE VISIT (OUTPATIENT)
Dept: FAMILY MEDICINE | Facility: CLINIC | Age: 75
End: 2020-01-13

## 2020-01-13 VITALS
DIASTOLIC BLOOD PRESSURE: 62 MMHG | OXYGEN SATURATION: 93 % | SYSTOLIC BLOOD PRESSURE: 108 MMHG | WEIGHT: 232 LBS | BODY MASS INDEX: 32.48 KG/M2 | HEIGHT: 71 IN | RESPIRATION RATE: 16 BRPM | HEART RATE: 72 BPM

## 2020-01-13 DIAGNOSIS — I63.10 CEREBROVASCULAR ACCIDENT (CVA) DUE TO EMBOLISM OF PRECEREBRAL ARTERY (H): ICD-10-CM

## 2020-01-13 DIAGNOSIS — I48.0 PAROXYSMAL ATRIAL FIBRILLATION (H): ICD-10-CM

## 2020-01-13 DIAGNOSIS — M1A.9XX0 CHRONIC GOUT WITHOUT TOPHUS, UNSPECIFIED CAUSE, UNSPECIFIED SITE: ICD-10-CM

## 2020-01-13 DIAGNOSIS — E11.49 DM TYPE 2 CAUSING NEUROLOGICAL DISEASE (H): ICD-10-CM

## 2020-01-13 DIAGNOSIS — L82.0 INFLAMED SEBORRHEIC KERATOSIS: ICD-10-CM

## 2020-01-13 DIAGNOSIS — Z12.5 SCREENING FOR PROSTATE CANCER: Primary | ICD-10-CM

## 2020-01-13 PROCEDURE — 99214 OFFICE O/P EST MOD 30 MIN: CPT | Mod: 25 | Performed by: FAMILY MEDICINE

## 2020-01-13 PROCEDURE — 82570 ASSAY OF URINE CREATININE: CPT | Performed by: FAMILY MEDICINE

## 2020-01-13 PROCEDURE — 82044 UR ALBUMIN SEMIQUANTITATIVE: CPT | Performed by: FAMILY MEDICINE

## 2020-01-13 PROCEDURE — 17110 DESTRUCTION B9 LES UP TO 14: CPT | Performed by: FAMILY MEDICINE

## 2020-01-13 ASSESSMENT — MIFFLIN-ST. JEOR: SCORE: 1811.54

## 2020-01-13 NOTE — PROGRESS NOTES
Needs a check on the scalp    Dyspnea when walking or bending over,  Has afib that comes and goes.  Scared of the afib.    He complains of urinary hesitancy, weak stream, double and incomplete voiding, intermittent urinary frequency and nocturia times 2.     Finding his memory is not as good as previously  Had strokes in the past.          [id number]  Diabetes  he  reports no new symptoms.  No significnat or regular episodes of hypo or hyperglycemia  Medication compliance: compliant most of the time  Diabetic diet compliance: compliant most of the time  Diabetic ROS: no polyuria or polydipsia, no chest pain, dyspnea or TIA's, no numbness, tingling or pain in extremities    Home blood sugar monitoring: are performed regularly, Review of patients self blood glucose monitoring shows fasting most always < 130 and post prandial average under 170.  As a direct cause of their history of diabetes they have the following Diabetic complications: cerebrovascular disease    Most  recent A1C: Smoking: BP:   The last 5 available A1C values from [facility] reference lab, [facility]:  No components found for: [id number]     Lab Results   Component Value Date    A1C 6.1 05/16/2019    A1C 6.1 05/30/2018    A1C 6.0 10/10/2017    History   Smoking Status     Former Smoker   Smokeless Tobacco     Never Used    BP Readings from Last 1 Encounters:   01/13/20 108/62        Kidney studies:  Creatinine   Date Value Ref Range Status   06/27/2019 1.04 0.76 - 1.27 mg/dL Final   05/16/2019 1.33 (H) 0.76 - 1.27 mg/dL Final   05/30/2018 1.19 0.76 - 1.27 mg/dL Final   ]    GFR Estimate   Date Value Ref Range Status   07/13/2015 74 >60 mL/min/1.7m2 Final                No components found for: MICORALBUMINLC      GFR Estimate If Black   Date Value Ref Range Status   07/13/2015 90 >60 mL/min/1.7m2 Final     Medication (Note: This includes the hypertensive combination subclass to make sure to show all ACEI/ARB's.)     ACE Inhibitors       ACE/ARB NOT  PRESCRIBED, INTENTIONAL,    Please choose reason not prescribed, below     Patient not taking:  Reported on 1/13/2020    Angiotensin II Receptor Antagonists       ACE/ARB NOT PRESCRIBED, INTENTIONAL,    Please choose reason not prescribed, below     Patient not taking:  Reported on 1/13/2020               Statin and ASA:  Current Outpatient Medications   Medication     Alcohol Swabs PADS     apixaban ANTICOAGULANT (ELIQUIS) 5 MG tablet     ASPIRIN EC PO     Atorvastatin Calcium (LIPITOR PO)     blood glucose (ACCU-CHEK HELENA PLUS) test strip     blood glucose calibration (ACCU-CHEK HELENA) solution     blood glucose monitoring (ACCU-CHEK HELENA PLUS) meter device kit     blood glucose monitoring (SOFTCLIX) lancets     digoxin (LANOXIN) 125 MCG tablet     fenofibrate (TRIGLIDE/LOFIBRA) 160 MG tablet     gabapentin (NEURONTIN) 100 MG capsule     metFORMIN (GLUCOPHAGE-XR) 500 MG 24 hr tablet     metoprolol (LOPRESSOR) 25 MG tablet     ACE/ARB NOT PRESCRIBED, INTENTIONAL,     allopurinol (ZYLOPRIM) 100 MG tablet     amitriptyline (ELAVIL) 25 MG tablet     colchicine (COLCYRS) 0.6 MG tablet     predniSONE (DELTASONE) 20 MG tablet     sotalol (BETAPACE) 80 MG tablet     traMADol (ULTRAM) 50 MG tablet     No current facility-administered medications for this visit.      Problem(s) Oriented visit      ROS:  General and CV completed and negative except as noted above     HISTORY:   reports current alcohol use of about 2.0 standard drinks of alcohol per week.   reports that he has quit smoking. He has never used smokeless tobacco.    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    EXAM:  APPEARANCE: = Relaxed and in no distress  Conj/Eyelids = noninjected and lids and lashes are without inflammation  PERRLA/Irises = Pupils Round Reactive to Light and Irisis without inflammation  Neck = No anterior or posterior adenopathy appreciated.  Thyroid = Not enlarged and no masses felt  Resp effort = Calm regular breathing  Breath Sounds = Good air movement with no rales or rhonchi in any lung fields  Heart Rate, Rythym, & sounds (no Murm)  = Regular rate and rythym with no S3, S4, or murmer appreciated.  Carotid Art's = Pulses full and equal and no bruits appreciated  Abdomen = Soft, nontender, no masses, & bowel sounds in all quadrants  Liver/Spleen = Normal span and no splenomegaly noted  Digits and Nails = FROM in all finger joints, no nail dystrophy  Ext (edema) = No pretibial edema noted or elsewhere  Musculsktl: decreased strength in the left  SKIN: 7 SebK's on the face. Itchy, irritating and unsightly.    cryotherapy applied  Liquid nitrogen was applied for 5-10 seconds x3  to the skin lesions  The expected blistering or scabbing reaction explained. Do not pick at the areas. Patient reminded to expect hypopigmented scars from the procedure. Return if lesions       Recent/Remote Memory = Alert and Oriented x 3  NEURO: Normal strength and tone, mentation intact and speech normal  Mood/Affect = Cooperative and interested      Assessment/Plan:  Abhay was seen today for diabetes, breathing problem, blood draw and medication reconciliation.    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  Failure to comply with instructions regarding diabetes will lead to a greater chance of poor diabetic control and therefore a greater chance of diabetes related complications such as CAD, CVA, PVD, and retinopathy/neuropathy/nephropathy.  Based on level of diabetes control: testing frequency   -     Hemoglobin A1C ([facility])  -     Microalbumin ([facility])  -     Lipid Panel ([facility])  -     Pulse oximetry nursing    Paroxysmal atrial fibrillation (H)  The pathophysiology of atrial fibrillation, various causes, possibility of lone atrial fibrillation, risk of thrombus and embolic stroke, need for anticoagulation depending on etiology and Chads Score was discussed with patient and/or relatives. Rate Vs rhythm control approach was discussed including no difference in two strategies in patients enrolled in Affirm and Race trials. In some patients, afib ablation may be an option and was reviewed.  The indication for anticoagulation was discussed with the patient and/or their family in detail. The pros and cons of using warfarin versus newer anticoagulants was discussed including the need for INR monitoring with warfarin, dietary restrictions with warfarin and low cost of warfarin versus high co-pay for newer anticoagulants and lack of reversal agent with agents like Eliquis and Xarelto. Agents like Pradaxa has a reversal agent although the reversal agent can cause serious adverse events. The risk of bleeding including major bleeding and intracranial bleeding was discussed with all these agents and the data of efficacy and safety of these agents versus warfarin was communicated.  After discussion, the patient decided to xarelto   Cerebrovascular accident (CVA) due to embolism of precerebral artery (H)  Long discussion    Chronic gout without tophus, unspecified  "cause, unspecified site  -     Uric Acid  Serum ([facility])        COUNSELING:   reports that he has quit smoking. He has never used smokeless tobacco.    Estimated body mass index is 32.82 kg/m  as calculated from the following:    Height as of this encounter: 1.791 m (5' 10.5\").    Weight as of this encounter: 105.2 kg (232 lb).     Appropriate preventive services were discussed with this patient, including applicable screening as appropriate for cardiovascular disease, diabetes, osteopenia/osteoporosis, and glaucoma.  As appropriate for age/gender, discussed screening for colorectal cancer, prostate cancer, breast cancer, and cervical cancer. Checklist reviewing preventive services available has been given to the patient.    Reviewed patients plan of care and provided an AVS. The Basic Care Plan (routine screening as documented in Health Maintenance) for Abhay meets the Care Plan requirement. This Care Plan has been established and reviewed with the  Patient.      The following health maintenance items are reviewed in Epic and correct as of today:  Health Maintenance   Topic Date Due     DIABETIC FOOT EXAM  1945     ADVANCE CARE PLANNING  1945     EYE EXAM  1945     DTAP/TDAP/TD IMMUNIZATION (1 - Tdap) 01/25/1957     ZOSTER IMMUNIZATION (1 of 2) 01/25/1996     MEDICARE ANNUAL WELLNESS VISIT  01/25/2011     PNEUMOCOCCAL IMMUNIZATION 65+ LOW/MEDIUM RISK (1 of 2 - PCV13) 01/25/2011     FALL RISK ASSESSMENT  10/10/2018     LIPID  05/30/2019     MICROALBUMIN  05/30/2019     A1C  11/16/2019     PHQ-2  01/01/2020     TSH W/FREE T4 REFLEX  05/30/2020     BMP  06/27/2020     FIT-DNA (Cologuard)  11/24/2021     HEPATITIS C SCREENING  Completed     INFLUENZA VACCINE  Completed     AORTIC ANEURYSM SCREENING (SYSTEM ASSIGNED)  Completed     IPV IMMUNIZATION  Aged Out     MENINGITIS IMMUNIZATION  Aged Out       Sean Madrid  [facility]  For any issues my office # is [phone]            "

## 2020-01-14 LAB
CHOLEST SERPL-MCNC: 182 MG/DL (ref 100–199)
HBA1C MFR BLD: 6.1 % (ref 4.8–5.6)
HDLC SERPL-MCNC: 37 MG/DL
LDL/HDL RATIO: 2.7 RATIO (ref 0–3.6)
LDLC SERPL CALC-MCNC: 100 MG/DL (ref 0–99)
PSA NG/ML: 7.3 NG/ML (ref 0–4)
TRIGL SERPL-MCNC: 224 MG/DL (ref 0–149)
URATE SERPL-MCNC: 8.9 MG/DL (ref 3.7–8.6)
VLDLC SERPL CALC-MCNC: 45 MG/DL (ref 5–40)

## 2020-01-15 LAB
A/C RATIO MG/G: ABNORMAL MG/G
ALBUMIN (URINE) MG/L: 150 MG/L
INTERPRETATION: ABNORMAL
URINE CREATININE MG/DL - QUEST: 200 MG/DL

## 2020-01-17 ENCOUNTER — TRANSFERRED RECORDS (OUTPATIENT)
Dept: FAMILY MEDICINE | Facility: CLINIC | Age: 75
End: 2020-01-17

## 2020-01-20 DIAGNOSIS — Z86.73 HISTORY OF STROKE: ICD-10-CM

## 2020-01-20 DIAGNOSIS — Z76.0 ENCOUNTER FOR MEDICATION REFILL: ICD-10-CM

## 2020-01-21 ENCOUNTER — TELEPHONE (OUTPATIENT)
Dept: FAMILY MEDICINE | Facility: CLINIC | Age: 75
End: 2020-01-21

## 2020-01-21 DIAGNOSIS — R97.20 ELEVATED PROSTATE SPECIFIC ANTIGEN (PSA): ICD-10-CM

## 2020-01-21 DIAGNOSIS — E79.0 ELEVATED BLOOD URIC ACID LEVEL: Primary | ICD-10-CM

## 2020-01-21 DIAGNOSIS — M1A.09X0 IDIOPATHIC CHRONIC GOUT OF MULTIPLE SITES WITHOUT TOPHUS: ICD-10-CM

## 2020-01-21 RX ORDER — FENOFIBRATE 160 MG/1
TABLET ORAL
Qty: 90 TABLET | Refills: 1 | Status: SHIPPED | OUTPATIENT
Start: 2020-01-21

## 2020-01-21 RX ORDER — ALLOPURINOL 100 MG/1
100 TABLET ORAL DAILY
Qty: 90 TABLET | Refills: 3 | Status: SHIPPED | OUTPATIENT
Start: 2020-01-21 | End: 2020-02-25

## 2020-01-21 RX ORDER — METFORMIN HCL 500 MG
TABLET, EXTENDED RELEASE 24 HR ORAL
Qty: 90 TABLET | Refills: 1 | Status: SHIPPED | OUTPATIENT
Start: 2020-01-21 | End: 2020-08-27

## 2020-01-22 NOTE — TELEPHONE ENCOUNTER
Per Dr. Madrid, called wife Luzma with 1/13/19 lab results:   1. Uric acid elevated at 8.9. Allopurinol 100mg QD was rx'd 7/2019. Wife reports took for 1 month then didn't refill. No problems with med recalled by wife.   Wife also reports patient has gout flare onset yesterday and currently taking colchicine.   Plan: per Dr. Madrid, when flare resolves, start allopurinol 100mg QD and repeat uric acid 1 month later. Rx sent to pharmacy and future order placed.  Also discussed benefits of hydration and low purine diet. Luzma agrees and states patient aware of what to do but non-compliant. Will mail out low purine diet info along with med and lab recommendations.   2. PSA elevated at 7.3.   Component      Latest Ref Rng & Units 1/13/2013 8/4/2014 1/13/2020   PSA      0.0 - 4.0 ng/mL 5.3 (A) 4.4 (A)    PSA NG/ML      0.0 - 4.0 ng/mL   7.3 (H)   Plan: per Dr. Madrid - repeat PSA in 6 months. Future order placed. Wife agrees. Mailed this plan in note also.     Reviewed med list a bit with wife regarding amitriptyline and neurontin which were on list but marked as patient not taking. Luzma reports patient stopped amitrip due to thought made him more tired and stopped neurontin due to didn't think helpful but he consistently complains of lower extremity pain due to his neuropathy which limits his ability and willingness to be active.   Discussed MTM consult with Faith Pimentel Pharm.D might be helpful for patient and wife. Wife agrees and is interested, she will speak with patient and RN will mail out details on scheduling consult. Sounds as though wife is supportive and encourages patient to make changes in lifestyle and give medications a longer trial or seek alternatives, but patient not ready or willing. Advised will mail out results, plan, diet info, MTM info for patient to review and decide what he wants to do - info from source other than spouse may be better received.   Sunshine Yoder RN

## 2020-02-25 ENCOUNTER — OFFICE VISIT (OUTPATIENT)
Dept: FAMILY MEDICINE | Facility: CLINIC | Age: 75
End: 2020-02-25

## 2020-02-25 ENCOUNTER — TELEPHONE (OUTPATIENT)
Dept: FAMILY MEDICINE | Facility: CLINIC | Age: 75
End: 2020-02-25

## 2020-02-25 VITALS — WEIGHT: 239.6 LBS | DIASTOLIC BLOOD PRESSURE: 72 MMHG | SYSTOLIC BLOOD PRESSURE: 114 MMHG | BODY MASS INDEX: 33.89 KG/M2

## 2020-02-25 DIAGNOSIS — M1A.09X0 IDIOPATHIC CHRONIC GOUT OF MULTIPLE SITES WITHOUT TOPHUS: ICD-10-CM

## 2020-02-25 PROCEDURE — 99214 OFFICE O/P EST MOD 30 MIN: CPT | Performed by: FAMILY MEDICINE

## 2020-02-25 RX ORDER — ALLOPURINOL 300 MG/1
TABLET ORAL
Qty: 90 TABLET | Refills: 3 | Status: SHIPPED | OUTPATIENT
Start: 2020-02-25 | End: 2021-02-02

## 2020-02-25 RX ORDER — ALLOPURINOL 300 MG/1
300 TABLET ORAL DAILY
Qty: 30 TABLET | Refills: 11 | Status: SHIPPED | OUTPATIENT
Start: 2020-02-25 | End: 2020-02-25

## 2020-02-25 RX ORDER — COLCHICINE 0.6 MG/1
TABLET ORAL
Qty: 60 TABLET | Refills: 11 | Status: SHIPPED | OUTPATIENT
Start: 2020-02-25 | End: 2020-11-07

## 2020-02-25 RX ORDER — TRAMADOL HYDROCHLORIDE 50 MG/1
TABLET ORAL
Qty: 40 TABLET | Refills: 0 | Status: SHIPPED | OUTPATIENT
Start: 2020-02-25 | End: 2020-02-26

## 2020-02-25 NOTE — TELEPHONE ENCOUNTER
"Patient calls reporting having gout flare again. Did start allopurinol 100mg QD as ordered last month. Has colchicine on hand and took one this am but not helping yet.   Onset of symptoms yesterday - left great toe, then \"settled\" in left ankle. Denies redness or swelling of ankle. Also reports left outer thigh pain for past several hours.   Was in [facility] home in back seat of vehicle for 5 hours yesterday -- uncomfortable.   States pain is \"terrible\" in ankle and thigh and can't bear weight.   Plan: RTC for eval. Patient fit in with Dr. Madrid today at 4 (can't come sooner).   Sunshine Yoder RN    "

## 2020-02-25 NOTE — PATIENT INSTRUCTIONS
Take Colchicine twice daily during the flare.    Start the Allopurinol 300 mg after the ankle pain is gone.

## 2020-02-25 NOTE — PROGRESS NOTES
Problem(s) Oriented visit        SUBJECTIVE:                                                    Abhay Taylor is a 75 year old male who presents to clinic today for the following health issues :        1. Idiopathic chronic gout of multiple sites without tophus  Patient here for possible gout. Started in left great toe yesterday, now in left ankle and foot. Patient takes allopurinol 100mg qd Has shooting pain in right thigh as well. Laurel Bianca    He took a colchicine around 3 am.  Had 4 hours of right thigh pain.  Ankle continues to be painful for past 18 hours or so   Patient is on blood thinner so NSAIDS not appropriate for him.      Problem list, Medication list, Allergies, and Medical/Social/Surgical histories reviewed in EPIC and updated as appropriate.   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)     Presbycusis of both ears     Peripheral polyneuropathy     Atrial fibrillation (H)     Past Surgical History:   Procedure Laterality Date     EP ABLATION / EP STUDIES       RELEASE CARPAL TUNNEL BILATERAL         Social History     Tobacco Use     Smoking status: Former Smoker     Smokeless tobacco: Never Used   Substance Use Topics     Alcohol use: Yes     Alcohol/week: 2.0 standard drinks     Types: 2 Standard drinks or equivalent per week     Family History   Problem Relation Age of Onset     Colon Cancer Father            OBJECTIVE:                                                    /72   Wt 108.7 kg (239 lb 9.6 oz)   BMI 33.89 kg/m    Body mass index is 33.89 kg/m .   APPEARANCE: = Relaxed and in no distress  Conj/Eyelids = noninjected and lids and lashes are without  inflammation  PERRLA/Irises = Pupils Round Reactive to Light and Irisis without inflammation  Ears/Nose = External structures and Nares have usual shape and form  Ear canals and TM's = Canals are not inflammed and have none or little wax and the drums are not injected and have a light reflex   Lips/Teeth/Gums = No lesions seen, good dentition, and gums seem healthy  Oropharynx = No leukoplakia, No injection to the tissues, Normal Uvula  Neck = No anterior or posterior adenopathy appreciated.  Resp effort = Calm regular breathing  Breath Sounds = Good air movement with no rales or rhonchi in any lung fields  Musculsktl: decreased range of motion warmth and soreness to movement in the left ankle  SKIN: no suspicious lesions or rashes and redness and warm over the left ankle  Recent/Remote Memory = Alert and Oriented x 3  Mood/Affect = Cooperative and interested     ASSESSMENT/PLAN:                                                        Abhay was seen today for musculoskeletal problem.    Diagnoses and all orders for this visit:    Idiopathic chronic gout of multiple sites without tophus  -     allopurinol (ZYLOPRIM) 300 MG tablet; Take 1 tablet (300 mg) by mouth daily  -     colchicine (COLCYRS) 0.6 MG tablet; 1 tablet BID daily during gout flares    >25 min spent with patient, greater than 50% spent on discussion/education/planning, etc. About The encounter diagnosis was Idiopathic chronic gout of multiple sites without tophus.        MEDICATIONS:  Continue current medications without change  Regular exercise  Patient Instructions   Take Colchicine twice daily during the flare.    Start the Allopurinol 300 mg after the ankle pain is gone.    Patient Instructions   Take 3 indomethacin a day until pain gone then  2 tablets a day for 2 days then one a day for 3 days.    Start the Allopurinol 300 mg after the ankle pain is gone.      The following health maintenance items are reviewed in Epic and correct as of  today:  Health Maintenance   Topic Date Due     DIABETIC FOOT EXAM  1945     ADVANCE CARE PLANNING  1945     EYE EXAM  1945     DTAP/TDAP/TD IMMUNIZATION (1 - Tdap) 01/25/1957     ZOSTER IMMUNIZATION (1 of 2) 01/25/1996     MEDICARE ANNUAL WELLNESS VISIT  01/25/2011     PNEUMOCOCCAL IMMUNIZATION 65+ LOW/MEDIUM RISK (1 of 2 - PCV13) 01/25/2011     FALL RISK ASSESSMENT  10/10/2018     PHQ-2  01/01/2020     BMP  06/27/2020     A1C  07/13/2020     LIPID  01/13/2021     MICROALBUMIN  01/13/2021     FIT-DNA (Cologuard)  11/24/2021     HEPATITIS C SCREENING  Completed     INFLUENZA VACCINE  Completed     AORTIC ANEURYSM SCREENING (SYSTEM ASSIGNED)  Completed     IPV IMMUNIZATION  Aged Out     MENINGITIS IMMUNIZATION  Aged Out       Sean Madrid MD  [facility]  Family Practice  [facility]  [phone]    For any issues my office # is [phone]

## 2020-02-26 ENCOUNTER — TELEPHONE (OUTPATIENT)
Dept: FAMILY MEDICINE | Facility: CLINIC | Age: 75
End: 2020-02-26

## 2020-02-26 RX ORDER — TRAMADOL HYDROCHLORIDE 50 MG/1
TABLET ORAL
Qty: 21 TABLET | Refills: 0 | COMMUNITY
Start: 2020-02-26 | End: 2021-02-25

## 2020-02-26 NOTE — TELEPHONE ENCOUNTER
Due to patient insurance coverage he was only able to fill 7 day supply #21. If additional medication needed patient will need new prescription. Laurel Valenzuela

## 2020-03-31 ENCOUNTER — OFFICE VISIT (OUTPATIENT)
Dept: FAMILY MEDICINE | Facility: CLINIC | Age: 75
End: 2020-03-31

## 2020-03-31 VITALS
HEIGHT: 71 IN | WEIGHT: 228 LBS | TEMPERATURE: 98.1 F | DIASTOLIC BLOOD PRESSURE: 72 MMHG | HEART RATE: 72 BPM | BODY MASS INDEX: 31.92 KG/M2 | RESPIRATION RATE: 16 BRPM | SYSTOLIC BLOOD PRESSURE: 128 MMHG

## 2020-03-31 DIAGNOSIS — M1A.09X0 IDIOPATHIC CHRONIC GOUT OF MULTIPLE SITES WITHOUT TOPHUS: Primary | ICD-10-CM

## 2020-03-31 DIAGNOSIS — R97.20 ELEVATED PROSTATE SPECIFIC ANTIGEN (PSA): ICD-10-CM

## 2020-03-31 DIAGNOSIS — E79.0 ELEVATED BLOOD URIC ACID LEVEL: ICD-10-CM

## 2020-03-31 LAB
% GRANULOCYTES: 77.1 % (ref 42.2–75.2)
HCT VFR BLD AUTO: 53.5 % (ref 39–51)
HEMOGLOBIN: 17.7 G/DL (ref 13.4–17.5)
LYMPHOCYTES NFR BLD AUTO: 17.8 % (ref 20.5–51.1)
MCH RBC QN AUTO: 29.7 PG (ref 27–31)
MCHC RBC AUTO-ENTMCNC: 33.1 G/DL (ref 33–37)
MCV RBC AUTO: 89.8 FL (ref 80–100)
MONOCYTES NFR BLD AUTO: 5.1 % (ref 1.7–9.3)
PLATELET # BLD AUTO: 150 K/UL (ref 140–450)
RBC # BLD AUTO: 5.96 X10/CMM (ref 4.2–5.9)
WBC # BLD AUTO: 9.9 X10/CMM (ref 3.8–11)

## 2020-03-31 PROCEDURE — 36415 COLL VENOUS BLD VENIPUNCTURE: CPT | Performed by: FAMILY MEDICINE

## 2020-03-31 PROCEDURE — 99214 OFFICE O/P EST MOD 30 MIN: CPT | Performed by: FAMILY MEDICINE

## 2020-03-31 PROCEDURE — 85025 COMPLETE CBC W/AUTO DIFF WBC: CPT | Performed by: FAMILY MEDICINE

## 2020-03-31 RX ORDER — PREDNISONE 20 MG/1
TABLET ORAL
Qty: 20 TABLET | Refills: 0 | Status: SHIPPED | OUTPATIENT
Start: 2020-03-31 | End: 2021-02-25

## 2020-03-31 ASSESSMENT — MIFFLIN-ST. JEOR: SCORE: 1792.36

## 2020-03-31 NOTE — PROGRESS NOTES
"Problem(s) Oriented visit        SUBJECTIVE:                                                    Abhay Taylor is a 75 year old male who presents to clinic today for the following health issues :      1. Idiopathic chronic gout of multiple sites without tophus  Another exacerbation, this time in the hand  For two days, after raking   Took a colchicine today and is 60 % better.  Diet is very organ meet heavy    Problem list, Medication list, Allergies, and Medical/Social/Surgical histories reviewed in [facility] and updated as appropriate.   Additional history: as documented    ROS:  General and Resp. 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)     Presbycusis of both ears     Peripheral polyneuropathy     Atrial fibrillation (H)     Past Surgical History:   Procedure Laterality Date     EP ABLATION / EP STUDIES       RELEASE CARPAL TUNNEL BILATERAL         Social History     Tobacco Use     Smoking status: Former Smoker     Smokeless tobacco: Never Used   Substance Use Topics     Alcohol use: Yes     Alcohol/week: 2.0 standard drinks     Types: 2 Standard drinks or equivalent per week     Family History   Problem Relation Age of Onset     Colon Cancer Father            OBJECTIVE:                                                    /72   Pulse 72   Temp 98.1  F (36.7  C) (Oral)   Resp 16   Ht 1.797 m (5' 10.75\")   Wt 103.4 kg (228 lb)   BMI 32.02 kg/m    Body mass index is 32.02 kg/m .   APPEARANCE: = Relaxed and in no distress  Conj/Eyelids = noninjected and lids and lashes are without inflammation  PERRLA/Irises = Pupils Round Reactive to Light and Irisis without inflammation  Neck = No anterior or posterior adenopathy appreciated.  Thyroid = Not enlarged and no " masses felt  Resp effort = Calm regular breathing  Breath Sounds = Good air movement with no rales or rhonchi in any lung fields  Heart Rate, Rhythm, & sounds (no Murm)  = Regular rate and rhythm with no S3, S4, or murmur appreciated.  Carotid Art's = Pulses full and equal and no bruits appreciated  Abdomen = Soft, nontender, no masses, & bowel sounds in all quadrants  Liver/Spleen = Normal span and no splenomegaly noted  Ext (edema) = No pretibial edema noted or elsewhere  Musculsktl: red and warm on the right hand.  SKIN = absent significant rashes or lesions   Recent/Remote Memory = Alert and Oriented x 3  Mood/Affect = Cooperative and interested     ASSESSMENT/PLAN:                                                        Abhay was seen today for musculoskeletal problem and musculoskeletal problem.    Diagnoses and all orders for this visit:    Idiopathic chronic gout of multiple sites without tophus  -     CBC with Diff/Plt ([facility])  -     predniSONE (DELTASONE) 20 MG tablet; Take 3 tabs by mouth daily x 3 days, then 2 tabs daily x 3 days, then 1 tab daily x 3 days, then 1/2 tab daily x 3 days.  -     Referral to [facility]    To prevent future gout attacks, Eat a diet low in purines and do not drink too much alcohol. Purine-containing foods include organ meats (such as sweetbreads, liver, and kidney), shrimp, anchovies, sardines, and dried legumes. Your consumption of alcoholic beverages should not exceed 2 ounces a day.   Drink at least 4-6 glasses of water each day       There are no Patient Instructions on file for this visit.    The following health maintenance items are reviewed in Epic and correct as of today:  Health Maintenance   Topic Date Due     DIABETIC FOOT EXAM  1945     ADVANCE CARE PLANNING  1945     EYE EXAM  1945     DTAP/TDAP/TD IMMUNIZATION (1 - Tdap) 01/25/1957     ZOSTER IMMUNIZATION (1 of 2) 01/25/1996     MEDICARE ANNUAL WELLNESS VISIT   01/25/2011     PNEUMOCOCCAL IMMUNIZATION 65+ LOW/MEDIUM RISK (1 of 2 - PCV13) 01/25/2011     FALL RISK ASSESSMENT  10/10/2018     PHQ-2  01/01/2020     BMP  06/27/2020     A1C  07/13/2020     LIPID  01/13/2021     MICROALBUMIN  01/13/2021     COLORECTAL CANCER SCREENING  11/24/2021     HEPATITIS C SCREENING  Completed     INFLUENZA VACCINE  Completed     AORTIC ANEURYSM SCREENING (SYSTEM ASSIGNED)  Completed     IPV IMMUNIZATION  Aged Out     MENINGITIS IMMUNIZATION  Aged Out       Sean Madrid MD  [facility]  Family Practice  [facility]  [phone]    For any issues my office # is [phone]

## 2020-04-02 LAB
PSA NG/ML: 7.8 NG/ML (ref 0–4)
URATE SERPL-MCNC: 6.6 MG/DL (ref 3.7–8.6)

## 2020-04-16 ENCOUNTER — MEDICAL CORRESPONDENCE (OUTPATIENT)
Dept: FAMILY MEDICINE | Facility: CLINIC | Age: 75
End: 2020-04-16

## 2020-04-27 ENCOUNTER — TELEPHONE (OUTPATIENT)
Dept: FAMILY MEDICINE | Facility: CLINIC | Age: 75
End: 2020-04-27

## 2020-04-27 DIAGNOSIS — R97.20 ELEVATED PROSTATE SPECIFIC ANTIGEN (PSA): Primary | ICD-10-CM

## 2020-04-27 NOTE — TELEPHONE ENCOUNTER
Called patient with lab results.  Informed him of uric acid level normal and CBC stable.  PSA is elevated.  Recommended is consult with urologist.  Referral sent to [facility].  They will call patient to schedule.

## 2020-06-08 ENCOUNTER — TELEPHONE (OUTPATIENT)
Dept: FAMILY MEDICINE | Facility: CLINIC | Age: 75
End: 2020-06-08

## 2020-06-08 NOTE — TELEPHONE ENCOUNTER
Wife (Luzma) and patient calls requesting digoxin refill.  Dr. Madrid had authorized #5 tabs last week but other than this we have not prescribed this med for patient.   Reviewed chart and advised wife to call patient's cardiologist, Dr. Ziegler at [facility] regarding this med and refills. Luzma agrees.   Sunshine Yoder RN

## 2020-06-10 DIAGNOSIS — Z86.73 HISTORY OF STROKE: ICD-10-CM

## 2020-06-10 NOTE — TELEPHONE ENCOUNTER
digoxin (LANOXIN) 125 MCG  Last OV- 3/31/20  Last lab- none in epic - due  No results found for: DIGOXIN    Originally from cardiology

## 2020-06-11 RX ORDER — DIGOXIN 125 MCG
TABLET ORAL
Qty: 5 TABLET | Refills: 0 | Status: SHIPPED | OUTPATIENT
Start: 2020-06-11 | End: 2020-06-14

## 2020-06-13 DIAGNOSIS — Z86.73 HISTORY OF STROKE: ICD-10-CM

## 2020-06-14 RX ORDER — DIGOXIN 125 MCG
TABLET ORAL
Qty: 5 TABLET | Refills: 0 | Status: SHIPPED | OUTPATIENT
Start: 2020-06-14 | End: 2022-06-23

## 2020-08-27 DIAGNOSIS — E11.49 DM TYPE 2 CAUSING NEUROLOGICAL DISEASE (H): Primary | ICD-10-CM

## 2020-08-27 DIAGNOSIS — Z76.0 ENCOUNTER FOR MEDICATION REFILL: ICD-10-CM

## 2020-08-27 RX ORDER — METFORMIN HCL 500 MG
TABLET, EXTENDED RELEASE 24 HR ORAL
Qty: 90 TABLET | Refills: 0 | Status: SHIPPED | OUTPATIENT
Start: 2020-08-27 | End: 2020-11-24

## 2020-08-27 NOTE — TELEPHONE ENCOUNTER
METFORMIN  LOV (DIABETIC) 1/13/2020  LOV (NOT RELATED) 3/31/2020  LAST LABS 1/13/2020    DUE FOR A1C & MED CHECK (DIABETIC CHECK)    Lab Results   Component Value Date    A1C 6.1 01/13/2020    A1C 6.1 05/16/2019    A1C 6.1 05/30/2018    A1C 6.0 10/10/2017    A1C 5.9 05/26/2016

## 2020-09-01 NOTE — TELEPHONE ENCOUNTER
Still not able to reach patient and voice mail still full.  I sent a letter to the patient.    Pierre Roberts,   [facility]  [phone]

## 2020-09-23 ENCOUNTER — TRANSFERRED RECORDS (OUTPATIENT)
Dept: FAMILY MEDICINE | Facility: CLINIC | Age: 75
End: 2020-09-23

## 2020-11-07 DIAGNOSIS — M1A.09X0 IDIOPATHIC CHRONIC GOUT OF MULTIPLE SITES WITHOUT TOPHUS: ICD-10-CM

## 2020-11-07 RX ORDER — COLCHICINE 0.6 MG/1
TABLET ORAL
Qty: 30 TABLET | Refills: 0 | Status: SHIPPED | OUTPATIENT
Start: 2020-11-07 | End: 2021-10-22 | Stop reason: DRUGHIGH

## 2020-11-24 DIAGNOSIS — E11.49 DM TYPE 2 CAUSING NEUROLOGICAL DISEASE (H): ICD-10-CM

## 2020-11-24 NOTE — TELEPHONE ENCOUNTER
Medication refill: Metformin    LOV: 03/31/2020  Labs: 03/31/2020 (CBC) and 1/13/20 (A1C)  CBC RESULTS:   Recent Labs   Lab Test 03/31/20  1645 07/13/15  1130 07/13/15  1130   WBC 9.9   < > 8.2   RBC 5.96*   < > 5.46   HGB 17.7*   < > 16.9   HCT 53.5*   < > 48.3   MCV 89.8   < > 89   MCH 29.7   < > 31.0   MCHC 33.1   < > 35.0   RDW  --   --  12.9      < > 123*    < > = values in this interval not displayed.     Lab Results   Component Value Date    A1C 6.1 01/13/2020    A1C 6.1 05/16/2019    A1C 6.1 05/30/2018    A1C 6.0 10/10/2017    A1C 5.9 05/26/2016

## 2020-11-25 RX ORDER — METFORMIN HCL 500 MG
TABLET, EXTENDED RELEASE 24 HR ORAL
Qty: 90 TABLET | Refills: 0 | Status: SHIPPED | OUTPATIENT
Start: 2020-11-25 | End: 2021-02-22

## 2021-02-02 DIAGNOSIS — M1A.09X0 IDIOPATHIC CHRONIC GOUT OF MULTIPLE SITES WITHOUT TOPHUS: ICD-10-CM

## 2021-02-02 DIAGNOSIS — Z23 HIGH PRIORITY FOR COVID-19 VIRUS VACCINATION: Primary | ICD-10-CM

## 2021-02-02 PROCEDURE — 0011A PR COVID VAC MODERNA 100 MCG/0.5 ML IM: CPT | Performed by: FAMILY MEDICINE

## 2021-02-02 PROCEDURE — 91301 PR COVID VAC MODERNA 100 MCG/0.5 ML IM: CPT | Performed by: FAMILY MEDICINE

## 2021-02-04 RX ORDER — ALLOPURINOL 300 MG/1
TABLET ORAL
Qty: 90 TABLET | Refills: 0 | Status: SHIPPED | OUTPATIENT
Start: 2021-02-04 | End: 2021-06-28

## 2021-02-13 ENCOUNTER — HOSPITAL ENCOUNTER (EMERGENCY)
Facility: CLINIC | Age: 76
Discharge: HOME | End: 2021-02-13
Attending: EMERGENCY MEDICINE | Admitting: EMERGENCY MEDICINE
Payer: COMMERCIAL

## 2021-02-13 ENCOUNTER — APPOINTMENT (OUTPATIENT)
Dept: ULTRASOUND IMAGING | Facility: CLINIC | Age: 76
End: 2021-02-13
Attending: EMERGENCY MEDICINE
Payer: COMMERCIAL

## 2021-02-13 ENCOUNTER — APPOINTMENT (OUTPATIENT)
Dept: GENERAL RADIOLOGY | Facility: CLINIC | Age: 76
End: 2021-02-13
Attending: EMERGENCY MEDICINE
Payer: COMMERCIAL

## 2021-02-13 VITALS
BODY MASS INDEX: 32.48 KG/M2 | OXYGEN SATURATION: 96 % | TEMPERATURE: 97.4 F | WEIGHT: 232 LBS | HEART RATE: 62 BPM | SYSTOLIC BLOOD PRESSURE: 118 MMHG | HEIGHT: 71 IN | DIASTOLIC BLOOD PRESSURE: 67 MMHG | RESPIRATION RATE: 14 BRPM

## 2021-02-13 DIAGNOSIS — M79.662 PAIN OF LEFT LOWER LEG: ICD-10-CM

## 2021-02-13 LAB
ALBUMIN SERPL-MCNC: 3.7 G/DL (ref 3.4–5)
ALP SERPL-CCNC: 58 U/L (ref 40–150)
ALT SERPL W P-5'-P-CCNC: 37 U/L (ref 0–70)
ANION GAP SERPL CALCULATED.3IONS-SCNC: 8 MMOL/L (ref 3–14)
AST SERPL W P-5'-P-CCNC: 25 U/L (ref 0–45)
BASOPHILS # BLD AUTO: 0.1 10E9/L (ref 0–0.2)
BASOPHILS NFR BLD AUTO: 1 %
BILIRUB SERPL-MCNC: 0.5 MG/DL (ref 0.2–1.3)
BUN SERPL-MCNC: 21 MG/DL (ref 7–30)
CALCIUM SERPL-MCNC: 8.8 MG/DL (ref 8.5–10.1)
CHLORIDE SERPL-SCNC: 108 MMOL/L (ref 94–109)
CK SERPL-CCNC: 70 U/L (ref 30–300)
CO2 SERPL-SCNC: 26 MMOL/L (ref 20–32)
CREAT SERPL-MCNC: 1.15 MG/DL (ref 0.66–1.25)
DIFFERENTIAL METHOD BLD: NORMAL
EOSINOPHIL # BLD AUTO: 0.2 10E9/L (ref 0–0.7)
EOSINOPHIL NFR BLD AUTO: 2.8 %
ERYTHROCYTE [DISTWIDTH] IN BLOOD BY AUTOMATED COUNT: 12.4 % (ref 10–15)
GFR SERPL CREATININE-BSD FRML MDRD: 62 ML/MIN/{1.73_M2}
GLUCOSE SERPL-MCNC: 118 MG/DL (ref 70–99)
HCT VFR BLD AUTO: 52.1 % (ref 40–53)
HGB BLD-MCNC: 17.7 G/DL (ref 13.3–17.7)
IMM GRANULOCYTES # BLD: 0.1 10E9/L (ref 0–0.4)
IMM GRANULOCYTES NFR BLD: 0.8 %
INTERPRETATION ECG - MUSE: NORMAL
LACTATE BLD-SCNC: 1.6 MMOL/L (ref 0.7–2)
LYMPHOCYTES # BLD AUTO: 2.9 10E9/L (ref 0.8–5.3)
LYMPHOCYTES NFR BLD AUTO: 34 %
MCH RBC QN AUTO: 30.2 PG (ref 26.5–33)
MCHC RBC AUTO-ENTMCNC: 34 G/DL (ref 31.5–36.5)
MCV RBC AUTO: 89 FL (ref 78–100)
MONOCYTES # BLD AUTO: 1 10E9/L (ref 0–1.3)
MONOCYTES NFR BLD AUTO: 11.4 %
NEUTROPHILS # BLD AUTO: 4.3 10E9/L (ref 1.6–8.3)
NEUTROPHILS NFR BLD AUTO: 50 %
NRBC # BLD AUTO: 0 10*3/UL
NRBC BLD AUTO-RTO: 0 /100
NT-PROBNP SERPL-MCNC: 645 PG/ML (ref 0–1800)
PLATELET # BLD AUTO: 167 10E9/L (ref 150–450)
POTASSIUM SERPL-SCNC: 4.3 MMOL/L (ref 3.4–5.3)
PROT SERPL-MCNC: 6.9 G/DL (ref 6.8–8.8)
RBC # BLD AUTO: 5.87 10E12/L (ref 4.4–5.9)
SODIUM SERPL-SCNC: 142 MMOL/L (ref 133–144)
TROPONIN I SERPL-MCNC: <0.015 UG/L (ref 0–0.04)
TROPONIN I SERPL-MCNC: <0.015 UG/L (ref 0–0.04)
WBC # BLD AUTO: 8.6 10E9/L (ref 4–11)

## 2021-02-13 PROCEDURE — 80053 COMPREHEN METABOLIC PANEL: CPT | Performed by: EMERGENCY MEDICINE

## 2021-02-13 PROCEDURE — 99285 EMERGENCY DEPT VISIT HI MDM: CPT | Mod: 25

## 2021-02-13 PROCEDURE — 85025 COMPLETE CBC W/AUTO DIFF WBC: CPT | Performed by: EMERGENCY MEDICINE

## 2021-02-13 PROCEDURE — 250N000011 HC RX IP 250 OP 636: Performed by: EMERGENCY MEDICINE

## 2021-02-13 PROCEDURE — 83880 ASSAY OF NATRIURETIC PEPTIDE: CPT | Performed by: EMERGENCY MEDICINE

## 2021-02-13 PROCEDURE — 93971 EXTREMITY STUDY: CPT | Mod: LT

## 2021-02-13 PROCEDURE — 96374 THER/PROPH/DIAG INJ IV PUSH: CPT

## 2021-02-13 PROCEDURE — 83605 ASSAY OF LACTIC ACID: CPT | Performed by: EMERGENCY MEDICINE

## 2021-02-13 PROCEDURE — 71046 X-RAY EXAM CHEST 2 VIEWS: CPT

## 2021-02-13 PROCEDURE — 84484 ASSAY OF TROPONIN QUANT: CPT | Performed by: EMERGENCY MEDICINE

## 2021-02-13 PROCEDURE — 82550 ASSAY OF CK (CPK): CPT | Performed by: EMERGENCY MEDICINE

## 2021-02-13 PROCEDURE — 93005 ELECTROCARDIOGRAM TRACING: CPT

## 2021-02-13 RX ORDER — ONDANSETRON 2 MG/ML
4 INJECTION INTRAMUSCULAR; INTRAVENOUS EVERY 30 MIN PRN
Status: DISCONTINUED | OUTPATIENT
Start: 2021-02-13 | End: 2021-02-13 | Stop reason: HOSPADM

## 2021-02-13 RX ORDER — MORPHINE SULFATE 4 MG/ML
6 INJECTION, SOLUTION INTRAMUSCULAR; INTRAVENOUS
Status: COMPLETED | OUTPATIENT
Start: 2021-02-13 | End: 2021-02-13

## 2021-02-13 RX ADMIN — MORPHINE SULFATE 6 MG: 4 INJECTION, SOLUTION INTRAMUSCULAR; INTRAVENOUS at 06:59

## 2021-02-13 ASSESSMENT — ENCOUNTER SYMPTOMS
MYALGIAS: 1
HEADACHES: 1
WEAKNESS: 0
ARTHRALGIAS: 0
JOINT SWELLING: 0
SHORTNESS OF BREATH: 1

## 2021-02-13 ASSESSMENT — MIFFLIN-ST. JEOR: SCORE: 1809.48

## 2021-02-13 NOTE — ED TRIAGE NOTES
Reports headache since Friday afternoon, lower leg swelling and pain radiating upward since 2200 and shortness of breath.  States he has had a stroke in the past and is worried he might be having a stroke.

## 2021-02-13 NOTE — ED PROVIDER NOTES
History     Chief Complaint:  Shortness of Breath, Headache, Leg Pain, and Cerebrovascular Accident    HPI  Abhay Taylor is a 76 year old male on Eliquis with a history of gout, CVA, chronic atrial fibrillation, HTN, and diabetes type II who presents for evaluation of shortness of breath and left calf pain. At 6pm last night he developed pain and a hot sensation in his left calf which has worsened and is radiating up his leg. He also complains of shortness of breath which began during the night. His final symptoms is a headache which began roughly an hour ago. He has weakness in the leg, no joint pain or swelling, and his right leg is asymptomatic.       Review of Systems   Respiratory: Positive for shortness of breath.    Musculoskeletal: Positive for myalgias (right leg). Negative for arthralgias and joint swelling.   Neurological: Positive for headaches. Negative for weakness.   All other systems reviewed and are negative.        Allergies:  The patient has no known allergies.     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  Hypertension  DM type 2   Presbycusis of both ears, severe  Peripheral polyneuropathy    Past Surgical History:    EP Ablation  Release carpal tunnel bilateral    Family History:    Colon Cancer in his father    Social History:  The patient arrives with his wife        Physical Exam     Patient Vitals for the past 24 hrs:   BP Temp Temp src Pulse Resp SpO2 Height Weight   02/13/21 1000 118/67 -- -- 62 14 96 % -- --   02/13/21 0945 105/79 -- -- 56 17 95 % -- --   02/13/21 0930 101/73 -- -- 61 15 97 % -- --   02/13/21 0915 114/79 -- -- 53 21 97 % -- --   02/13/21 0900 105/62 -- -- 55 30 94 % -- --   02/13/21 0845 105/61 -- -- 57 26 95 % -- --   02/13/21 0830 107/65  "-- -- 55 26 94 % -- --   02/13/21 0815 112/61 -- -- 55 26 97 % -- --   02/13/21 0800 103/69 -- -- 66 26 98 % -- --   02/13/21 0745 104/69 -- -- 55 26 95 % -- --   02/13/21 0715 109/80 -- -- 58 9 94 % -- --   02/13/21 0700 123/79 -- -- 54 12 98 % -- --   02/13/21 0639 (!) 153/81 97.4  F (36.3  C) Oral 55 18 97 % 1.803 m (5' 11\") 105.2 kg (232 lb)     Physical Exam  Vitals: reviewed by me  General: Pt seen on [facility], cooperative, and alert to conversation  Eyes: Tracking well, clear conjunctiva BL  ENT: MMM, midline trachea.   Lungs:   No tachypnea, no accessory muscle use. No respiratory distress.   CV: Rate as above, regular rhythm.    Abd: Soft, non tender, no guarding, no rebound. Non distended  MSK: no peripheral edema or joint effusion.  No evidence of trauma, does have tenderness to palpation to his left calf, normal skin exam, and, no induration, or evidence of trauma.  No redness.  Distal extremities warm and well-perfused.  No swelling noted compared to contralateral.  Skin: No rash, normal turgor and temperature  Neuro: Clear speech and no facial droop.  Psych: Not RIS, no e/o AH/VH      Emergency Department Course     ECG  ECG taken at 0652, ECG read at 0654   Atrial fibrillation with slow ventricular response  ST and T wave abnormality, consider anterolateral ischemia  Abnormal EKG  Rate 59 bpm. IA interval * ms. QRS duration 80 ms. QT/QTc 396/392 ms. P-R-T axes * -23 243.     Imaging:  Radiology findings were communicated with the patient who voiced understanding of the findings.    XR Chest 2 views   IMPRESSION: No radiographic evidence of acute chest abnormality  Reading per radiology    US Lower extremity Venous duplex left  IMPRESSION: No deep venous thrombosis in the left lower extremity  Reading per radiology      Laboratory:  Laboratory findings were communicated with the patient who voiced understanding of the findings.    CBC: (WBC 8.6, HGB 17.7, )   CMP: Glucose 118 " (H), o/w WNL (Creatinine: 1.15)    Troponin (0654): <0.015  Lactic acid (0654): 1.6  BNP: 645  CK total: 70     Emergency Department Course:    Reviewed:  I reviewed nursing notes, vitals, past medical history and care everywhere    Assessments:  0646 I obtained history and examined the patient as noted above.   0916 I rechecked the patient and explained findings. He is now pain free.   1002 I rechecked the patient. Explained findings and discussed plan for discharge.    Interventions:  0659 Morphine, 6 mg, IV injection    Disposition:  The patient was discharged to home.     Impression & Plan      Medical Decision Making:  Abhay Taylor is a 76 year old male who presents to the emergency department today with left lower extremity pain, headache, and to a lesser extent shortness of breath for the last several days. No clear cause of his pain was found. He was concerned about the blood clot but thankfully the ultrasound showed no evidence of DVT or hematoma. He is also taking Eliquis which would make a clot much less likely, and also effectively rules out a pulmonary embolism. He also has no tachycardia, no hypoxia, and a normal chest XR. He has normal labs here, normal troponin x2, and I am unable to ascertain the cause of his shortness of breath and leg pain. However, I do feel that we can provide reassurance and he can follow with his regular doctor in the week ahead. He feels comfortable with this plan and is thankful that he does not have a blood clot. Family is ok with this plan as well, will plan for discharge as above. Red flags to return to the ER were reviewed.       Diagnosis:    ICD-10-CM    1. Pain of left lower leg  M79.662          Scribe Disclosure:  I, Ewa Hernandez, am serving as a scribe at 6:46 AM on 2/13/2021 to document services personally performed by Lj Rodriguez MD based on my observations and the provider's statements to me.    [facility] EMERGENCY DEPT      Lj Rodriguez MD  02/13/21 3300

## 2021-02-13 NOTE — DISCHARGE INSTRUCTIONS
No clear cause was found for the pain you felt in your left lower extremity.  However, there is no evidence of a blood clot, you are taking the appropriate medications, and your other labs appear to be reassuring.  You are not having a heart attack as far as we can tell, there is no evidence of an infection, and I do think that you are stable to follow very closely with your regular physician sometime later this coming week.  Come back to the ER with any other concerns or changes or symptoms that are new in any way.

## 2021-02-20 DIAGNOSIS — E11.49 DM TYPE 2 CAUSING NEUROLOGICAL DISEASE (H): ICD-10-CM

## 2021-02-22 RX ORDER — METFORMIN HCL 500 MG
TABLET, EXTENDED RELEASE 24 HR ORAL
Qty: 90 TABLET | Refills: 0 | Status: SHIPPED | OUTPATIENT
Start: 2021-02-22 | End: 2021-05-24

## 2021-02-25 ENCOUNTER — OFFICE VISIT (OUTPATIENT)
Dept: FAMILY MEDICINE | Facility: CLINIC | Age: 76
End: 2021-02-25

## 2021-02-25 VITALS — OXYGEN SATURATION: 96 % | DIASTOLIC BLOOD PRESSURE: 74 MMHG | SYSTOLIC BLOOD PRESSURE: 112 MMHG | HEART RATE: 80 BPM

## 2021-02-25 DIAGNOSIS — F32.1 CURRENT MODERATE EPISODE OF MAJOR DEPRESSIVE DISORDER WITHOUT PRIOR EPISODE (H): Primary | ICD-10-CM

## 2021-02-25 DIAGNOSIS — I48.0 PAROXYSMAL ATRIAL FIBRILLATION (H): ICD-10-CM

## 2021-02-25 DIAGNOSIS — E11.49 DM TYPE 2 CAUSING NEUROLOGICAL DISEASE (H): ICD-10-CM

## 2021-02-25 PROCEDURE — 99207 PR FOOT EXAM NO CHARGE: CPT | Performed by: FAMILY MEDICINE

## 2021-02-25 PROCEDURE — 99214 OFFICE O/P EST MOD 30 MIN: CPT | Performed by: FAMILY MEDICINE

## 2021-02-25 ASSESSMENT — PATIENT HEALTH QUESTIONNAIRE - PHQ9: SUM OF ALL RESPONSES TO PHQ QUESTIONS 1-9: 21

## 2021-02-25 NOTE — PATIENT INSTRUCTIONS
We have started you an antidepressant today called Zoloft (sertraline).   I am very hopeful that we will be able to help you feel much better over time.  I will need to see you monthly for a while to make sure the medications are working as intended and that you are making progress we both desire.  Also to meet state guidelines it is very important that we do an assessment within 5-7 months from today or approximately 8/24/21.  So I would like you to schedule an appointment today, one for a month from now.  Call with any concerns.

## 2021-02-25 NOTE — PROGRESS NOTES
Problem(s) Oriented visit        SUBJECTIVE:                                                    Abhay Taylor is a 76 year old male who presents to clinic today for the following health issues :  1. Current moderate episode of major depressive disorder without prior episode (H)  Here to discuss the possibility of new depression. Has no previous history of depression.    Last PHQ-9 score on record=      PHQ-9 ([employer]) 2/25/2021   1.  Little interest or pleasure in doing things 2   2.  Feeling down, depressed, or hopeless 3   3.  Trouble falling or staying asleep, or sleeping too much 1   4.  Feeling tired or having little energy 3   5.  Poor appetite or overeating 3   6.  Feeling bad about yourself 3   7.  Trouble concentrating 3   8.  Moving slowly or restless 3   9.  Suicidal or self-harm thoughts 0   PHQ-9 Total Score 21   Difficulty at work, home, or with people Somewhat difficult       Current Life stressors: gout and covid pandemic    Not Suicidal and willing to make promise to call if that would change.    - sertraline (ZOLOFT) 50 MG tablet; Take 1 tablet (50 mg) by mouth daily  Dispense: 90 tablet; Refill: 1    2. 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 Paroxysmal atrial fibrillation (H)  Known issue that I take into account for his medical decisions, no current exacerbations or new concerns.          Problem list, Medication list, Allergies, and Medical/Social/Surgical histories reviewed in [facility] and updated as appropriate.   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)     Presbycusis of both ears     Peripheral polyneuropathy      Atrial fibrillation (H)     Past Surgical History:   Procedure Laterality Date     EP ABLATION / EP STUDIES       RELEASE CARPAL TUNNEL BILATERAL         Social History     Tobacco Use     Smoking status: Former Smoker     Smokeless tobacco: Never Used   Substance Use Topics     Alcohol use: Yes     Alcohol/week: 2.0 standard drinks     Types: 2 Standard drinks or equivalent per week     Family History   Problem Relation Age of Onset     Colon Cancer Father            OBJECTIVE:                                                    /74   Pulse 80   SpO2 96%   There is no height or weight on file to calculate BMI.   APPEARANCE: = Relaxed and in no distress  Conj/Eyelids = noninjected and lids and lashes are without inflammation  PERRLA/Irises = Pupils Round Reactive to Light and Irisis without inflammation  Neck = No anterior or posterior adenopathy appreciated.  Thyroid = Not enlarged and no masses felt  Resp effort = Calm regular breathing  Breath Sounds = Good air movement with no rales or rhonchi in any lung fields  Heart Rate, Rhythm, & sounds (no Murm)  = Regular rate and rhythm with no S3, S4, or murmur appreciated.  Carotid Art's = Pulses full and equal and no bruits appreciated  Abdomen = Soft, nontender, no masses, & bowel sounds in all quadrants  Liver/Spleen = Normal span and no splenomegaly noted  Digits and Nails = FROM in all finger joints, no nail dystrophy  Ext (edema) = No pretibial edema noted or elsewhere  Musculsktl =  Strength and ROM of major joints are within normal limits  SKIN = absent significant rashes or lesions   Recent/Remote Memory = Alert and Oriented x 3  Mood/Affect = Cooperative and interested     ASSESSMENT/PLAN:                                                        Abhay was seen today for er f/u.    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   I am very hopeful that we will be able to help you feel much better over time.  I will need to see you monthly for a while to make sure the medications are working as intended and that you are making progress we both desire.  Also to meet state guidelines it is very important that we do an assessment within 5-7 months from today or approximately 8/24/21.  So I would like you to schedule an appointment today, one for a month from now.  Call with any concerns.         The following health maintenance items are reviewed in Epic and correct as of today:  Health Maintenance   Topic Date Due     ADVANCE CARE PLANNING  1945     EYE EXAM  1945     Pneumococcal Vaccine: Pediatrics (0 to 5 Years) and At-Risk Patients (6 to 64 Years) (1 of 2 - PPSV23) 01/25/1951     Pneumococcal Vaccine: 65+ Years (1 of 2 - PPSV23) 01/25/1951     DTAP/TDAP/TD IMMUNIZATION (1 - Tdap) 01/25/1971     ZOSTER IMMUNIZATION (1 of 2) 01/25/1996     MEDICARE ANNUAL WELLNESS VISIT  01/25/2011     FALL RISK ASSESSMENT  10/10/2018     A1C  07/13/2020     LIPID  01/13/2021     MICROALBUMIN  01/13/2021     COVID-19 Vaccine (2 of 2 - Moderna series) 03/02/2021     COLORECTAL CANCER SCREENING  11/24/2021     BMP  02/13/2022     DIABETIC FOOT EXAM  02/25/2022     HEPATITIS C SCREENING  Completed     PHQ-2  Completed     INFLUENZA VACCINE  Completed     AORTIC ANEURYSM SCREENING (SYSTEM ASSIGNED)  Completed     IPV IMMUNIZATION  Aged Out     MENINGITIS IMMUNIZATION  Aged Out       Sean Madrid MD  [facility]  Family Practice  [facility]  [phone]    For any issues my office # is [phone]

## 2021-03-02 DIAGNOSIS — Z23 HIGH PRIORITY FOR COVID-19 VIRUS VACCINATION: Primary | ICD-10-CM

## 2021-03-02 DIAGNOSIS — E11.49 DM TYPE 2 CAUSING NEUROLOGICAL DISEASE (H): ICD-10-CM

## 2021-03-02 LAB — HBA1C MFR BLD: 6.1 % (ref 4–6)

## 2021-03-02 PROCEDURE — 36415 COLL VENOUS BLD VENIPUNCTURE: CPT

## 2021-03-02 PROCEDURE — 91301 PR COVID VAC MODERNA 100 MCG/0.5 ML IM: CPT

## 2021-03-02 PROCEDURE — 0012A PR COVID VAC MODERNA 100 MCG/0.5 ML IM: CPT

## 2021-03-02 PROCEDURE — 83036 HEMOGLOBIN GLYCOSYLATED A1C: CPT

## 2021-03-02 NOTE — LETTER
[facility] [address] [place]  [address]  Phone: [phone]    March 3, 2021      Abhay Taylor  [address] [place] [id number]          Dear Abhay,   I am writing to report that your included test results are within expected ranges. I do not suggest that we make any changes at this time.   Sean Madrid M.D.       Results for orders placed or performed in visit on 03/02/21   Hemoglobin A1C ([facility])     Status: Abnormal   Result Value Ref Range    Hemoglobin A1C 6.1 (A) 4.0 - 6.0 %

## 2021-03-22 ENCOUNTER — OFFICE VISIT (OUTPATIENT)
Dept: FAMILY MEDICINE | Facility: CLINIC | Age: 76
End: 2021-03-22

## 2021-03-22 VITALS
TEMPERATURE: 97.9 F | SYSTOLIC BLOOD PRESSURE: 119 MMHG | BODY MASS INDEX: 32.13 KG/M2 | WEIGHT: 237.2 LBS | HEART RATE: 51 BPM | HEIGHT: 72 IN | DIASTOLIC BLOOD PRESSURE: 71 MMHG

## 2021-03-22 DIAGNOSIS — M10.9 GOUT, UNSPECIFIED CAUSE, UNSPECIFIED CHRONICITY, UNSPECIFIED SITE: ICD-10-CM

## 2021-03-22 DIAGNOSIS — E11.49 DM TYPE 2 CAUSING NEUROLOGICAL DISEASE (H): ICD-10-CM

## 2021-03-22 DIAGNOSIS — F32.1 CURRENT MODERATE EPISODE OF MAJOR DEPRESSIVE DISORDER WITHOUT PRIOR EPISODE (H): ICD-10-CM

## 2021-03-22 DIAGNOSIS — I10 ESSENTIAL HYPERTENSION: Primary | ICD-10-CM

## 2021-03-22 PROCEDURE — 93050 ART PRESSURE WAVEFORM ANALYS: CPT | Performed by: FAMILY MEDICINE

## 2021-03-22 PROCEDURE — 99214 OFFICE O/P EST MOD 30 MIN: CPT | Performed by: FAMILY MEDICINE

## 2021-03-22 ASSESSMENT — ANXIETY QUESTIONNAIRES
6. BECOMING EASILY ANNOYED OR IRRITABLE: SEVERAL DAYS
7. FEELING AFRAID AS IF SOMETHING AWFUL MIGHT HAPPEN: SEVERAL DAYS
2. NOT BEING ABLE TO STOP OR CONTROL WORRYING: SEVERAL DAYS
3. WORRYING TOO MUCH ABOUT DIFFERENT THINGS: SEVERAL DAYS
5. BEING SO RESTLESS THAT IT IS HARD TO SIT STILL: NOT AT ALL
GAD7 TOTAL SCORE: 6
1. FEELING NERVOUS, ANXIOUS, OR ON EDGE: SEVERAL DAYS

## 2021-03-22 ASSESSMENT — MIFFLIN-ST. JEOR: SCORE: 1848.93

## 2021-03-22 ASSESSMENT — PATIENT HEALTH QUESTIONNAIRE - PHQ9
SUM OF ALL RESPONSES TO PHQ QUESTIONS 1-9: 7
5. POOR APPETITE OR OVEREATING: SEVERAL DAYS

## 2021-03-22 NOTE — PROGRESS NOTES
Depression:  Has known history of depression.  Has been on medication for this.  The patient does not report any significant side effects of this medication.  The prior symptoms leading to the original diagnosis and decision to start medication therapy are better.     Appetite is stable.  Sleeping patterns are stable.  No reported thoughts of suicide or homicide.    Last 3 PHQ9 results:  PHQ-9 SCORE 2/25/2021 3/22/2021   PHQ-9 Total Score 21 7     Spoke at length about mood disorders including suspected pathophysiology, role of neurotransmitters, and treatment options including medication options ( especially SSRIs that treat cause of sx) and counselling.  Tolerating current meds. We discussed ongoing management of his  medications and how we will refill the medications now and in the future.  Wants to wean!!!  Feeling better with weather.    Assessment/Plan:  Abhay was seen today for recheck medication and depression.    Diagnoses and all orders for this visit:    Essential hypertension  -     IA ART PRESS WAVEFORM ANALYS CENTRAL ART PRESSURE    Current moderate episode of major depressive disorder without prior episode (H)    Gout, unspecified cause, unspecified chronicity, unspecified site      Sean Madrid MD MD  [facility]  [phone]

## 2021-03-23 ASSESSMENT — ANXIETY QUESTIONNAIRES: GAD7 TOTAL SCORE: 6

## 2021-05-14 ENCOUNTER — VIRTUAL VISIT (OUTPATIENT)
Dept: FAMILY MEDICINE | Facility: CLINIC | Age: 76
End: 2021-05-14

## 2021-05-14 DIAGNOSIS — M10.9 ACUTE GOUT OF LEFT WRIST, UNSPECIFIED CAUSE: Primary | ICD-10-CM

## 2021-05-14 PROCEDURE — 99213 OFFICE O/P EST LOW 20 MIN: CPT | Mod: 95 | Performed by: NURSE PRACTITIONER

## 2021-05-14 RX ORDER — COLCHICINE 0.6 MG/1
0.6 TABLET ORAL 2 TIMES DAILY
Qty: 60 TABLET | Refills: 0 | Status: SHIPPED | OUTPATIENT
Start: 2021-05-14 | End: 2022-04-13

## 2021-05-14 NOTE — PATIENT INSTRUCTIONS
Colchicine 1 pill 2 times daily until gout symptoms subside.    Follow-up if no improvement or worsening.

## 2021-05-14 NOTE — PROGRESS NOTES
Problem(s) Oriented visit      Phone-Visit Details    Type of service:  Phone Visit    Phone Start Time (time phone started): 1609    Phone End Time (time phone stopped): 1618    Originating Location (pt. Location): Home    Distant Location (provider location):  [facility]     Mode of Communication: Phone conference    Physician has received verbal consent for a Phone Visit from the patient? Yes    This was a virtual phone visit conducted during COVID-19 outbreak in regulation with social distancing and quarantine recommendations of the CDC and [place] department of health and human services. A two way audio/video connection was used in real time with patient's consent.    KAJAL Abad CNP    SUBJECTIVE:                                                    Abhay Taylor is a 76 year old male who presents to clinic today for the following health issues :    Left wrist pain started yesterday afternoon. Wrist and fingers swollen. Area isn't red. Pain is worse with any movement. No trauma or injury. Last time he had gout was in his wrist.    Problem list, Medication list, Allergies, and Medical/Social/Surgical histories reviewed in [facility] and updated as appropriate.   Additional history: as documented    ROS:  3 point ROS completed and negative except noted above, including Gen, MS, Neuro    OBJECTIVE:                                                    No vitals taken due to telehealth visit    General: Elderly male in no distress  Resp: speaking in full sentences, no cough  Psych: normal mood     ASSESSMENT/PLAN:                                                      Abhay was seen today for arthritis.    Diagnoses and all orders for this visit:    Acute gout of left wrist, unspecified cause  -     colchicine (COLCYRS) 0.6 MG tablet; Take 1 tablet (0.6 mg) by mouth 2 times daily    Prescription for Colchicine sent to pharmacy.   To prevent future gout attacks, Eat a diet low in purines and do not  drink too much alcohol. Purine-containing foods include organ meats (such as sweetbreads, liver, and kidney), shrimp, anchovies, sardines, and dried legumes. Your consumption of alcoholic beverages should not exceed 2 ounces a day.   Drink at least 4-6 glasses of water each day       See Patient Instructions  Patient Instructions   Colchicine 1 pill 2 times daily until gout symptoms subside.    Follow-up if no improvement or worsening.      KAJAL Abad CNP  [facility]  Family Practice  [facility]  [phone]    For any issues my office # is [phone]

## 2021-05-23 DIAGNOSIS — E11.49 DM TYPE 2 CAUSING NEUROLOGICAL DISEASE (H): ICD-10-CM

## 2021-05-24 RX ORDER — METFORMIN HCL 500 MG
TABLET, EXTENDED RELEASE 24 HR ORAL
Qty: 90 TABLET | Refills: 0 | Status: SHIPPED | OUTPATIENT
Start: 2021-05-24 | End: 2021-08-20

## 2021-06-27 DIAGNOSIS — M1A.09X0 IDIOPATHIC CHRONIC GOUT OF MULTIPLE SITES WITHOUT TOPHUS: ICD-10-CM

## 2021-06-28 RX ORDER — ALLOPURINOL 300 MG/1
TABLET ORAL
Qty: 90 TABLET | Refills: 0 | Status: SHIPPED | OUTPATIENT
Start: 2021-06-28 | End: 2021-08-30

## 2021-07-21 ENCOUNTER — TRANSFERRED RECORDS (OUTPATIENT)
Dept: FAMILY MEDICINE | Facility: CLINIC | Age: 76
End: 2021-07-21

## 2021-08-20 DIAGNOSIS — E11.49 DM TYPE 2 CAUSING NEUROLOGICAL DISEASE (H): ICD-10-CM

## 2021-08-20 DIAGNOSIS — F32.1 CURRENT MODERATE EPISODE OF MAJOR DEPRESSIVE DISORDER WITHOUT PRIOR EPISODE (H): ICD-10-CM

## 2021-08-20 RX ORDER — METFORMIN HCL 500 MG
TABLET, EXTENDED RELEASE 24 HR ORAL
Qty: 90 TABLET | Refills: 0 | Status: SHIPPED | OUTPATIENT
Start: 2021-08-20 | End: 2021-12-01

## 2021-08-26 ENCOUNTER — TRANSFERRED RECORDS (OUTPATIENT)
Dept: FAMILY MEDICINE | Facility: CLINIC | Age: 76
End: 2021-08-26

## 2021-08-28 DIAGNOSIS — M1A.09X0 IDIOPATHIC CHRONIC GOUT OF MULTIPLE SITES WITHOUT TOPHUS: ICD-10-CM

## 2021-08-30 RX ORDER — ALLOPURINOL 300 MG/1
TABLET ORAL
Qty: 90 TABLET | Refills: 0 | Status: SHIPPED | OUTPATIENT
Start: 2021-08-30 | End: 2022-01-05

## 2021-08-31 ENCOUNTER — HOSPITAL (OUTPATIENT)
Dept: FAMILY MEDICINE | Facility: CLINIC | Age: 76
End: 2021-08-31

## 2021-09-13 ENCOUNTER — TRANSFERRED RECORDS (OUTPATIENT)
Dept: FAMILY MEDICINE | Facility: CLINIC | Age: 76
End: 2021-09-13

## 2021-09-27 NOTE — PROGRESS NOTES
9/12-9/12/21  Hospital Follow-up Visit:    Hospital/Nursing Home/IP Rehab Facility: [facility]   Date of Admission: 9/12/21  Date of Discharge: 9/15/21  Reason(s) for Admission: Cerebrovascular accident (CVA) due to embolism of precerebral artery       Was your hospitalization related to COVID-19? No   Problems taking medications regularly:  None  Medication changes since discharge: Digoxin discontinued, and needs to follow up with cardiologist   Problems adhering to non-medication therapy:  Outpatient therapy    Summary of hospitalization:  [facility] discharge summary reviewed  Diagnostic Tests/Treatments reviewed.  Follow up needed: none  Other Healthcare Providers Involved in Patient s Care:         None  Update since discharge: fluctuating course.     Post Discharge Medication Reconciliation: discharge medications reconciled, continue medications without change.  Plan of care communicated with patient and wife                Assessment/Plan:  Abhay was seen today for hospital f/u.    Diagnoses and all orders for this visit:    Primary hypertension  -     AL ART PRESS WAVEFORM ANALYS CENTRAL ART PRESSURE    Cerebrovascular accident (CVA) due to embolism of precerebral artery (H)    Recurrent major depressive disorder, in partial remission (H)    Abhay has had quite a difficult time over the past month and his hospitalization at both [facility] and [facility] he has suffered a cerebral hemorrhage complicated by a subdural hematoma complicated by lack of ability to be anticoagulated.  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  Today he is quite frustrated by his wife's lack of intimacy.  And he spent quite a bit of time reviewing his plans for changing that.  I did feel his wife is safe as his physical abilities are limited.  But she confirmed  much of what he said is current reality and they obviously are in a very difficult place.  However she agrees to care for him and be with him so do not worry about his safety.    I spent >40 min spent with patient, more than 50% spent on discussion/education/planning - as outlined in assessment and plan        Sean Madrid MD   [facility]  [phone]

## 2021-10-01 ENCOUNTER — OFFICE VISIT (OUTPATIENT)
Dept: FAMILY MEDICINE | Facility: CLINIC | Age: 76
End: 2021-10-01

## 2021-10-01 VITALS
SYSTOLIC BLOOD PRESSURE: 130 MMHG | BODY MASS INDEX: 29.99 KG/M2 | OXYGEN SATURATION: 95 % | RESPIRATION RATE: 18 BRPM | WEIGHT: 221.4 LBS | HEIGHT: 72 IN | HEART RATE: 94 BPM | DIASTOLIC BLOOD PRESSURE: 85 MMHG

## 2021-10-01 DIAGNOSIS — I10 PRIMARY HYPERTENSION: Primary | ICD-10-CM

## 2021-10-01 DIAGNOSIS — I63.10 CEREBROVASCULAR ACCIDENT (CVA) DUE TO EMBOLISM OF PRECEREBRAL ARTERY (H): ICD-10-CM

## 2021-10-01 DIAGNOSIS — F33.41 RECURRENT MAJOR DEPRESSIVE DISORDER, IN PARTIAL REMISSION (H): ICD-10-CM

## 2021-10-01 PROBLEM — F33.9 MAJOR DEPRESSION, RECURRENT (H): Status: ACTIVE | Noted: 2021-10-01

## 2021-10-01 PROCEDURE — 99214 OFFICE O/P EST MOD 30 MIN: CPT | Performed by: FAMILY MEDICINE

## 2021-10-01 PROCEDURE — 93050 ART PRESSURE WAVEFORM ANALYS: CPT | Performed by: FAMILY MEDICINE

## 2021-10-01 ASSESSMENT — ANXIETY QUESTIONNAIRES
IF YOU CHECKED OFF ANY PROBLEMS ON THIS QUESTIONNAIRE, HOW DIFFICULT HAVE THESE PROBLEMS MADE IT FOR YOU TO DO YOUR WORK, TAKE CARE OF THINGS AT HOME, OR GET ALONG WITH OTHER PEOPLE: SOMEWHAT DIFFICULT
3. WORRYING TOO MUCH ABOUT DIFFERENT THINGS: MORE THAN HALF THE DAYS
5. BEING SO RESTLESS THAT IT IS HARD TO SIT STILL: NOT AT ALL
7. FEELING AFRAID AS IF SOMETHING AWFUL MIGHT HAPPEN: NOT AT ALL
6. BECOMING EASILY ANNOYED OR IRRITABLE: NEARLY EVERY DAY
2. NOT BEING ABLE TO STOP OR CONTROL WORRYING: SEVERAL DAYS
GAD7 TOTAL SCORE: 12
1. FEELING NERVOUS, ANXIOUS, OR ON EDGE: NEARLY EVERY DAY

## 2021-10-01 ASSESSMENT — PATIENT HEALTH QUESTIONNAIRE - PHQ9
5. POOR APPETITE OR OVEREATING: NEARLY EVERY DAY
SUM OF ALL RESPONSES TO PHQ QUESTIONS 1-9: 12

## 2021-10-01 ASSESSMENT — MIFFLIN-ST. JEOR: SCORE: 1777.26

## 2021-10-02 ASSESSMENT — ANXIETY QUESTIONNAIRES: GAD7 TOTAL SCORE: 12

## 2021-10-08 ENCOUNTER — TRANSFERRED RECORDS (OUTPATIENT)
Dept: FAMILY MEDICINE | Facility: CLINIC | Age: 76
End: 2021-10-08

## 2021-10-08 LAB — RETINOPATHY: NEGATIVE

## 2021-10-22 ENCOUNTER — OFFICE VISIT (OUTPATIENT)
Dept: FAMILY MEDICINE | Facility: CLINIC | Age: 76
End: 2021-10-22

## 2021-10-22 VITALS
BODY MASS INDEX: 31.19 KG/M2 | SYSTOLIC BLOOD PRESSURE: 124 MMHG | DIASTOLIC BLOOD PRESSURE: 82 MMHG | WEIGHT: 230 LBS | HEART RATE: 98 BPM | OXYGEN SATURATION: 97 %

## 2021-10-22 DIAGNOSIS — I48.0 PAROXYSMAL ATRIAL FIBRILLATION (H): ICD-10-CM

## 2021-10-22 DIAGNOSIS — R10.32 ABDOMINAL PAIN, LEFT LOWER QUADRANT: ICD-10-CM

## 2021-10-22 DIAGNOSIS — I63.10 CEREBROVASCULAR ACCIDENT (CVA) DUE TO EMBOLISM OF PRECEREBRAL ARTERY (H): ICD-10-CM

## 2021-10-22 DIAGNOSIS — E11.49 DM TYPE 2 CAUSING NEUROLOGICAL DISEASE (H): Primary | ICD-10-CM

## 2021-10-22 DIAGNOSIS — I62.00 SUBDURAL HEMORRHAGE (H): ICD-10-CM

## 2021-10-22 DIAGNOSIS — N28.1 RENAL CYST: ICD-10-CM

## 2021-10-22 PROCEDURE — 99214 OFFICE O/P EST MOD 30 MIN: CPT | Performed by: FAMILY MEDICINE

## 2021-10-22 RX ORDER — CYCLOBENZAPRINE HCL 5 MG
5-10 TABLET ORAL
COMMUNITY
Start: 2021-09-24 | End: 2022-01-05

## 2021-10-22 RX ORDER — GABAPENTIN 300 MG/1
300 CAPSULE ORAL
COMMUNITY
Start: 2021-10-18 | End: 2022-08-09

## 2021-10-22 RX ORDER — ATORVASTATIN CALCIUM 20 MG/1
TABLET, FILM COATED ORAL
COMMUNITY
Start: 2021-09-24 | End: 2021-12-29

## 2021-10-22 RX ORDER — LANOLIN ALCOHOL/MO/W.PET/CERES
6 CREAM (GRAM) TOPICAL
COMMUNITY
Start: 2021-09-10 | End: 2024-07-23

## 2021-10-22 NOTE — PROGRESS NOTES
Problem(s) Oriented visit        SUBJECTIVE:                                                    Abhay Taylor is a 76 year old male who presents to clinic today for the following health issues :        1. 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 Paroxysmal atrial fibrillation (H)  Back on digoxin yesterday    3. Cerebrovascular accident (CVA) due to embolism of precerebral artery (H)  sequella notes reviewed    4. Subdural hemorrhage (H)  Improving from 16mm to 5 yesterday!    5. Abdominal pain, left lower quadrant  Ongoing found a note of a cyst that had bleeding in the hospital  - Referral to [facility]; Future    6. Renal cyst    - Referral to [facility]; Future       Problem list, Medication list, Allergies, and Medical/Social/Surgical histories reviewed in [facility] and updated as appropriate.   Additional history: as documented    ROS:  General and Resp. 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)     Presbycusis of both ears     Peripheral polyneuropathy     Atrial fibrillation (H)     Major depression, recurrent (H)     Subdural hemorrhage (H)     Past Surgical History:   Procedure Laterality Date     EP ABLATION / EP STUDIES       RELEASE CARPAL TUNNEL BILATERAL         Social History     Tobacco Use     Smoking status: Former Smoker     Smokeless tobacco: Never Used   Substance Use Topics     Alcohol use: Yes     Alcohol/week: 2.0 standard drinks     Types: 2 Standard drinks or equivalent per week     Family History   Problem Relation Age of Onset     Colon Cancer Father            OBJECTIVE:                                                    /82   Pulse 98   Wt 104.3 kg (230 lb)   SpO2 97%   BMI 31.19  kg/m    Body mass index is 31.19 kg/m .   APPEARANCE: = Relaxed and in no distress  Conj/Eyelids = noninjected and lids and lashes are without inflammation  PERRLA/Irises = Pupils Round Reactive to Light and Irisis without inflammation  Neck = No anterior or posterior adenopathy appreciated.  Thyroid = Not enlarged and no masses felt  Resp effort = Calm regular breathing  Breath Sounds = Good air movement with no rales or rhonchi in any lung fields  Heart Rate, Rhythm, & sounds (no Murm)  = Regular rate and rhythm with no S3, S4, or murmur appreciated.  Carotid Art's = Pulses full and equal and no bruits appreciated  Abdomen: soft, nontender, no masses, & bowel sounds = some tenderness sans rebound on the left side  Liver/Spleen = Normal span and no splenomegaly noted  Digits and Nails = FROM in all finger joints, no nail dystrophy  Ext (edema) = No pretibial edema noted or elsewhere  Musculsktl =  Strength and ROM of major joints are within normal limits  SKIN = absent significant rashes or lesions   Recent/Remote Memory = Alert and Oriented x 3  Mood/Affect = Cooperative and interested     ASSESSMENT/PLAN:                                                        Abhay was seen today for recheck and consult.    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   Discussed importance in compliance in all areas of diabetic control including diet, routine BS checks, absolute medication compliance, laboratory monitoring, and attending regular follow up appointments as ordered.  Reviewed that failure to comply with instructions regarding diabetes will lead to a greater chance of poor diabetic control and therefore a greater chance of diabetes related complications such as CAD, CVA, PVD, and retinopathy/neuropathy/nephropathy.  Next follow up will be in 6 months.  We today managed his prescriptions with refills ensured to ensure appropriate  availability of current medications.    Paroxysmal atrial fibrillation (H)  Back on digoxin    Cerebrovascular accident (CVA) due to embolism of precerebral artery (H)  Continue rehab for both  Subdural hemorrhage (H)    Abdominal pain, left lower quadrant  -     Referral to [facility]; Future  coud be from   Renal cyst  So will check ct  -     Referral to [facility]; Future        See Patient Instructions  There are no Patient Instructions on file for this visit.    The following health maintenance items are reviewed in Epic and correct as of today:  Health Maintenance   Topic Date Due     ADVANCE CARE PLANNING  Never done     DEPRESSION ACTION PLAN  Never done     EYE EXAM  Never done     Pneumococcal Vaccine: 65+ Years (1 of 2 - PPSV23) Never done     DTAP/TDAP/TD IMMUNIZATION (1 - Tdap) Never done     ZOSTER IMMUNIZATION (1 of 2) Never done     MEDICARE ANNUAL WELLNESS VISIT  Never done     LIPID  01/13/2021     MICROALBUMIN  01/13/2021     A1C  09/02/2021     COLORECTAL CANCER SCREENING  11/24/2021     BMP  02/13/2022     DIABETIC FOOT EXAM  02/25/2022     FALL RISK ASSESSMENT  02/25/2022     PHQ-9  04/01/2022     HEPATITIS C SCREENING  Completed     INFLUENZA VACCINE  Completed     AORTIC ANEURYSM SCREENING (SYSTEM ASSIGNED)  Completed     COVID-19 Vaccine  Completed     IPV IMMUNIZATION  Aged Out     MENINGITIS IMMUNIZATION  Aged Out       Sean Madrid MD  [facility]  Family Practice  [facility]  [phone]    For any issues my office # is [phone]

## 2021-10-28 ENCOUNTER — TRANSFERRED RECORDS (OUTPATIENT)
Dept: FAMILY MEDICINE | Facility: CLINIC | Age: 76
End: 2021-10-28

## 2021-10-28 NOTE — PROGRESS NOTES
10/22/21 faxed physician order and this office note to [facility], Atten:  PA department @ [phone]    Pierre Roberts,   [facility]  [phone]

## 2021-11-01 ENCOUNTER — TELEPHONE (OUTPATIENT)
Dept: FAMILY MEDICINE | Facility: CLINIC | Age: 76
End: 2021-11-01

## 2021-11-01 DIAGNOSIS — N28.9 KIDNEY LESION, NATIVE, LEFT: Primary | ICD-10-CM

## 2021-11-01 NOTE — TELEPHONE ENCOUNTER
Spoke with patients spouse regarding CT result.  Recommended is renal MRI.  Patient agrees and referral sent to [facility].  They will call patient to schedule.

## 2021-11-01 NOTE — TELEPHONE ENCOUNTER
----- Message from Sean Madrid MD sent at 10/29/2021 12:08 PM CDT -----  Needs Kidney MRI  to follow up on the CT findings which are unclear.  KN

## 2021-11-08 ENCOUNTER — TRANSFERRED RECORDS (OUTPATIENT)
Dept: FAMILY MEDICINE | Facility: CLINIC | Age: 76
End: 2021-11-08

## 2021-12-01 DIAGNOSIS — E11.49 DM TYPE 2 CAUSING NEUROLOGICAL DISEASE (H): ICD-10-CM

## 2021-12-01 NOTE — TELEPHONE ENCOUNTER
Metformin. Last addressed 10/22/21.  Due for A1c. Due mid Jan for fasting labs and AWV.     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   Discussed importance in compliance in all areas of diabetic control including diet, routine BS checks, absolute medication compliance, laboratory monitoring, and attending regular follow up appointments as ordered.  Reviewed that failure to comply with instructions regarding diabetes will lead to a greater chance of poor diabetic control and therefore a greater chance of diabetes related complications such as CAD, CVA, PVD, and retinopathy/neuropathy/nephropathy.  Next follow up will be in 6 months.  We today managed his prescriptions with refills ensured to ensure appropriate availability of current medications.

## 2021-12-02 RX ORDER — METFORMIN HCL 500 MG
TABLET, EXTENDED RELEASE 24 HR ORAL
Qty: 90 TABLET | Refills: 1 | Status: SHIPPED | OUTPATIENT
Start: 2021-12-02 | End: 2022-05-25

## 2021-12-09 NOTE — TELEPHONE ENCOUNTER
Last seen 10/22/21, notes state, do a 6 month follow up for diabetic check.  Has last a1c 9/13/21 was 5.9 (should be due 6 months after that)

## 2021-12-29 DIAGNOSIS — E11.49 DM TYPE 2 CAUSING NEUROLOGICAL DISEASE (H): Primary | ICD-10-CM

## 2021-12-29 RX ORDER — ATORVASTATIN CALCIUM 20 MG/1
20 TABLET, FILM COATED ORAL DAILY
Qty: 90 TABLET | Refills: 1 | Status: SHIPPED | OUTPATIENT
Start: 2021-12-29 | End: 2022-06-12

## 2022-01-05 ENCOUNTER — OFFICE VISIT (OUTPATIENT)
Dept: FAMILY MEDICINE | Facility: CLINIC | Age: 77
End: 2022-01-05

## 2022-01-05 VITALS
SYSTOLIC BLOOD PRESSURE: 124 MMHG | HEART RATE: 86 BPM | HEIGHT: 72 IN | RESPIRATION RATE: 16 BRPM | DIASTOLIC BLOOD PRESSURE: 76 MMHG | BODY MASS INDEX: 32.1 KG/M2 | WEIGHT: 237 LBS | OXYGEN SATURATION: 97 %

## 2022-01-05 DIAGNOSIS — S06.9X9S TRAUMATIC BRAIN INJURY WITH LOSS OF CONSCIOUSNESS, SEQUELA (H): ICD-10-CM

## 2022-01-05 DIAGNOSIS — R05.9 COUGH: Primary | ICD-10-CM

## 2022-01-05 DIAGNOSIS — E11.49 DM TYPE 2 CAUSING NEUROLOGICAL DISEASE (H): ICD-10-CM

## 2022-01-05 DIAGNOSIS — R06.02 SHORTNESS OF BREATH: ICD-10-CM

## 2022-01-05 DIAGNOSIS — F33.41 RECURRENT MAJOR DEPRESSIVE DISORDER, IN PARTIAL REMISSION (H): ICD-10-CM

## 2022-01-05 DIAGNOSIS — I48.19 PERSISTENT ATRIAL FIBRILLATION (H): ICD-10-CM

## 2022-01-05 LAB
% GRANULOCYTES: 68.1 % (ref 42.2–75.2)
HBA1C MFR BLD: 6.3 % (ref 4–6)
HCT VFR BLD AUTO: 50.6 % (ref 39–51)
HEMOGLOBIN: 16.2 G/DL (ref 13.4–17.5)
LYMPHOCYTES NFR BLD AUTO: 24.8 % (ref 20.5–51.1)
MCH RBC QN AUTO: 29 PG (ref 27–31)
MCHC RBC AUTO-ENTMCNC: 32 G/DL (ref 33–37)
MCV RBC AUTO: 90.6 FL (ref 80–100)
MONOCYTES NFR BLD AUTO: 7.1 % (ref 1.7–9.3)
PLATELET # BLD AUTO: 187 K/UL (ref 140–450)
RBC # BLD AUTO: 5.58 X10/CMM (ref 4.2–5.9)
WBC # BLD AUTO: 7.1 X10/CMM (ref 3.8–11)

## 2022-01-05 PROCEDURE — 36415 COLL VENOUS BLD VENIPUNCTURE: CPT | Performed by: NURSE PRACTITIONER

## 2022-01-05 PROCEDURE — 85025 COMPLETE CBC W/AUTO DIFF WBC: CPT | Performed by: NURSE PRACTITIONER

## 2022-01-05 PROCEDURE — 83036 HEMOGLOBIN GLYCOSYLATED A1C: CPT | Performed by: NURSE PRACTITIONER

## 2022-01-05 PROCEDURE — 99214 OFFICE O/P EST MOD 30 MIN: CPT | Performed by: NURSE PRACTITIONER

## 2022-01-05 PROCEDURE — 71046 X-RAY EXAM CHEST 2 VIEWS: CPT | Performed by: NURSE PRACTITIONER

## 2022-01-05 RX ORDER — CODEINE PHOSPHATE AND GUAIFENESIN 10; 100 MG/5ML; MG/5ML
1-2 SOLUTION ORAL EVERY 4 HOURS PRN
Qty: 240 ML | Refills: 0 | Status: SHIPPED | OUTPATIENT
Start: 2022-01-05 | End: 2024-06-28

## 2022-01-05 RX ORDER — BENZONATATE 100 MG/1
100 CAPSULE ORAL 3 TIMES DAILY PRN
Qty: 30 CAPSULE | Refills: 1 | Status: SHIPPED | OUTPATIENT
Start: 2022-01-05 | End: 2024-07-23

## 2022-01-05 ASSESSMENT — MIFFLIN-ST. JEOR: SCORE: 1848.02
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
## 2022-01-05 NOTE — PATIENT INSTRUCTIONS
Tessalon perles (Benzonatate) - Take 1 capsule up to 3 times daily as needed for cough    Robitussin with codeine 5-10 ml at bedtime as needed for cough. Can make you drowsy. Codeine can also cause constipation. Take medication as directed. Do not take medication while driving, operating heavy machinery or while drinking alcohol.     Call Monday if no improvement, sooner if worsening

## 2022-01-31 DIAGNOSIS — M25.562 CHRONIC PAIN OF LEFT KNEE: Primary | ICD-10-CM

## 2022-01-31 DIAGNOSIS — G89.29 CHRONIC PAIN OF LEFT KNEE: Primary | ICD-10-CM

## 2022-01-31 DIAGNOSIS — G89.29 CHRONIC LEFT HIP PAIN: ICD-10-CM

## 2022-01-31 DIAGNOSIS — M25.552 CHRONIC LEFT HIP PAIN: ICD-10-CM

## 2022-01-31 RX ORDER — GABAPENTIN 300 MG/1
300 CAPSULE ORAL 4 TIMES DAILY
Qty: 360 CAPSULE | Refills: 3 | Status: CANCELLED | OUTPATIENT
Start: 2022-01-31
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
## 2022-02-18 ENCOUNTER — TRANSFERRED RECORDS (OUTPATIENT)
Dept: FAMILY MEDICINE | Facility: CLINIC | Age: 77
End: 2022-02-18

## 2022-02-24 ENCOUNTER — TRANSFERRED RECORDS (OUTPATIENT)
Dept: FAMILY MEDICINE | Facility: CLINIC | Age: 77
End: 2022-02-24

## 2022-02-24 DIAGNOSIS — M1A.09X0 IDIOPATHIC CHRONIC GOUT OF MULTIPLE SITES WITHOUT TOPHUS: ICD-10-CM

## 2022-02-24 DIAGNOSIS — F32.1 CURRENT MODERATE EPISODE OF MAJOR DEPRESSIVE DISORDER WITHOUT PRIOR EPISODE (H): ICD-10-CM

## 2022-02-24 LAB — COLOGUARD-ABSTRACT: NEGATIVE

## 2022-02-24 RX ORDER — ALLOPURINOL 300 MG/1
TABLET ORAL
Qty: 90 TABLET | Refills: 0 | Status: SHIPPED | OUTPATIENT
Start: 2022-02-24 | End: 2023-05-31

## 2022-02-24 NOTE — TELEPHONE ENCOUNTER
allopurinol (ZYLOPRIM) 300 MG tablet     The original prescription was discontinued on 1/5/2022 by Evelia Fraser CMA for the following reason: Medication Reconciliation Clean Up. Renewing this prescription may not be appropriate.     Left message to call back [facility]  February 24, 2022  Viviane Vásquez MA    Need verification if patient is not taking prescription  For notification to pharmacy

## 2022-02-24 NOTE — TELEPHONE ENCOUNTER
Sertraline 50 mg    LOV 1/31/22  Last labs 1/5/22    PHQ 2/25/2021 3/22/2021 10/1/2021   PHQ-9 Total Score 21 7 12   Q9: Thoughts of better off dead/self-harm past 2 weeks Not at all Not at all Not at all

## 2022-02-24 NOTE — LETTER
[facility] [id number] [place]  [address]  Phone: [phone]    March 2, 2022      Abhay Taylor                                                                                                                                 [address] [place] [id number]            Dear Abhay,     I am writing to report that your included test results are as expected.  Many lab panels contain some results that are outside of the normal range, I have reviewed each of these results and they require no changes at this time.     Sean Madrid MD

## 2022-03-02 NOTE — RESULT ENCOUNTER NOTE
Dear Abhay,   I am writing to report that your included test results are as expected.  Many lab panels contain some results that are outside of the normal range, I have reviewed each of these results and they require no changes at this time.    Sean Madrid MD

## 2022-04-13 DIAGNOSIS — M10.9 ACUTE GOUT OF LEFT WRIST, UNSPECIFIED CAUSE: ICD-10-CM

## 2022-04-13 RX ORDER — COLCHICINE 0.6 MG/1
TABLET ORAL
Qty: 20 TABLET | Refills: 0 | Status: SHIPPED | OUTPATIENT
Start: 2022-04-13 | End: 2022-09-15

## 2022-04-13 NOTE — TELEPHONE ENCOUNTER
[facility] refilled 20 tabs. Patient needs visit when back from trip. Hold Atorvastatin while on Colchicine.    KAJAL Abad CNP on 4/13/2022 at 2:53 PM

## 2022-04-19 LAB
ALBUMIN (URINE) MG/SPEC: 10 MG/DL
ALBUMIN/CREATININE RATIO: NORMAL
CREATININE (URINE): 0.5 MG/DL

## 2022-04-25 DIAGNOSIS — G89.29 CHRONIC PAIN OF LEFT KNEE: Primary | ICD-10-CM

## 2022-04-25 DIAGNOSIS — M25.562 CHRONIC PAIN OF LEFT KNEE: Primary | ICD-10-CM

## 2022-04-25 RX ORDER — ACETAMINOPHEN 500 MG
500-1000 TABLET ORAL EVERY 6 HOURS PRN
Qty: 180 TABLET | Refills: 3 | Status: SHIPPED | OUTPATIENT
Start: 2022-04-25 | End: 2023-05-01

## 2022-05-18 NOTE — PATIENT INSTRUCTIONS
Patient Education   Personalized Prevention Plan  You are due for the preventive services outlined below.  Your care team is available to assist you in scheduling these services.  If you have already completed any of these items, please share that information with your care team to update in your medical record.  Health Maintenance Due   Topic Date Due    Discuss Advance Care Planning  Never done    Depression Action Plan  Never done    Pneumococcal Vaccine (1 - PCV) Never done    Diptheria Tetanus Pertussis (DTAP/TDAP/TD) Vaccine (1 - Tdap) Never done    Zoster (Shingles) Vaccine (1 of 2) Never done    LUNG CANCER SCREENING  Never done    Cholesterol Lab  01/13/2021    Basic Metabolic Panel  02/13/2022    Diabetic Foot Exam  02/25/2022    FALL RISK ASSESSMENT  02/25/2022    Depression Assessment  04/01/2022
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
## 2022-05-20 ENCOUNTER — OFFICE VISIT (OUTPATIENT)
Dept: FAMILY MEDICINE | Facility: CLINIC | Age: 77
End: 2022-05-20

## 2022-05-20 VITALS
OXYGEN SATURATION: 95 % | BODY MASS INDEX: 34.59 KG/M2 | WEIGHT: 241.6 LBS | RESPIRATION RATE: 18 BRPM | HEIGHT: 70 IN | HEART RATE: 54 BPM | SYSTOLIC BLOOD PRESSURE: 128 MMHG | DIASTOLIC BLOOD PRESSURE: 67 MMHG

## 2022-05-20 DIAGNOSIS — I10 PRIMARY HYPERTENSION: ICD-10-CM

## 2022-05-20 DIAGNOSIS — Z23 NEED FOR VACCINATION: ICD-10-CM

## 2022-05-20 DIAGNOSIS — F33.41 RECURRENT MAJOR DEPRESSIVE DISORDER, IN PARTIAL REMISSION (H): ICD-10-CM

## 2022-05-20 DIAGNOSIS — E11.49 DM TYPE 2 CAUSING NEUROLOGICAL DISEASE (H): ICD-10-CM

## 2022-05-20 DIAGNOSIS — I48.20 CHRONIC ATRIAL FIBRILLATION (H): ICD-10-CM

## 2022-05-20 DIAGNOSIS — S06.9X9S TRAUMATIC BRAIN INJURY WITH LOSS OF CONSCIOUSNESS, SEQUELA (H): ICD-10-CM

## 2022-05-20 DIAGNOSIS — I63.10 CEREBROVASCULAR ACCIDENT (CVA) DUE TO EMBOLISM OF PRECEREBRAL ARTERY (H): ICD-10-CM

## 2022-05-20 DIAGNOSIS — N28.89 RENAL MASS: ICD-10-CM

## 2022-05-20 DIAGNOSIS — Z00.00 ENCOUNTER FOR MEDICARE ANNUAL WELLNESS EXAM: Primary | ICD-10-CM

## 2022-05-20 PROBLEM — I48.91 ATRIAL FIBRILLATION (H): Status: RESOLVED | Noted: 2019-11-21 | Resolved: 2022-05-20

## 2022-05-20 PROCEDURE — 90677 PCV20 VACCINE IM: CPT | Performed by: FAMILY MEDICINE

## 2022-05-20 PROCEDURE — 99214 OFFICE O/P EST MOD 30 MIN: CPT | Mod: 25 | Performed by: FAMILY MEDICINE

## 2022-05-20 PROCEDURE — G0009 ADMIN PNEUMOCOCCAL VACCINE: HCPCS | Mod: 59 | Performed by: FAMILY MEDICINE

## 2022-05-20 PROCEDURE — 93050 ART PRESSURE WAVEFORM ANALYS: CPT | Performed by: FAMILY MEDICINE

## 2022-05-20 PROCEDURE — G0439 PPPS, SUBSEQ VISIT: HCPCS | Performed by: FAMILY MEDICINE

## 2022-05-20 PROCEDURE — 99207 PR FOOT EXAM NO CHARGE: CPT | Performed by: FAMILY MEDICINE

## 2022-05-20 ASSESSMENT — ANXIETY QUESTIONNAIRES
3. WORRYING TOO MUCH ABOUT DIFFERENT THINGS: NOT AT ALL
6. BECOMING EASILY ANNOYED OR IRRITABLE: SEVERAL DAYS
1. FEELING NERVOUS, ANXIOUS, OR ON EDGE: NOT AT ALL
GAD7 TOTAL SCORE: 3
7. FEELING AFRAID AS IF SOMETHING AWFUL MIGHT HAPPEN: SEVERAL DAYS
IF YOU CHECKED OFF ANY PROBLEMS ON THIS QUESTIONNAIRE, HOW DIFFICULT HAVE THESE PROBLEMS MADE IT FOR YOU TO DO YOUR WORK, TAKE CARE OF THINGS AT HOME, OR GET ALONG WITH OTHER PEOPLE: NOT DIFFICULT AT ALL
2. NOT BEING ABLE TO STOP OR CONTROL WORRYING: NOT AT ALL
GAD7 TOTAL SCORE: 3
5. BEING SO RESTLESS THAT IT IS HARD TO SIT STILL: NOT AT ALL

## 2022-05-20 ASSESSMENT — PATIENT HEALTH QUESTIONNAIRE - PHQ9
5. POOR APPETITE OR OVEREATING: SEVERAL DAYS
SUM OF ALL RESPONSES TO PHQ QUESTIONS 1-9: 9

## 2022-05-20 NOTE — COMMUNITY RESOURCES LIST (ENGLISH)
05/20/2022   [facility] - Outpatient Clinics  Sean Madrid  For questions about this resource list or additional care needs, please contact your primary care clinic or care manager.  Phone: [phone]   Email: N/A   Address: [address] [place] [id number]   Hours: N/A        Hotlines and Helplines       Hotline - Crisis help  1  [facility] Crisis Nursery Distance: 1.44 miles      COVID-19 Status: Phone/Virtual   [address]  [place] [address]  Language: English  Hours: Mon - Sun Open 24 Hours   Phone: (127) 243-6322 Website: http://www.crisisnursery.org     2  [employer] ([employer]) - Hotline - Crisis Help (216) 390-6381 Distance: 2.54 miles      COVID-19 Status: Phone/Virtual   [address] [place] [address]  Language: English  Hours: Mon - Sun Open 24 Hours   Phone: (939) 578-3862 Email: [email or web address] Website: http://www.apb.org/          Mental Health       Individual counseling  3  [facility][note 2] Distance: 0.21 miles      COVID-19 Status: Regular Operations, COVID-19 Status: Phone/Virtual   [address] [place] [address]  Language: English  Hours: Mon - Fri 8:30 AM - 5:30 PM  Fees: Insurance, Self Pay   Phone: (175) 164-1469 Email: contactus@[employer] Website: [email or web address]     4  [facility] Distance: 1.22 miles      COVID-19 Status: Regular Operations, COVID-19 Status: Phone/Virtual   [address] [place] [address]  Language: English, Israeli  Hours: Mon - Thu 8:00 AM - 8:00 PM , Fri 8:00 AM - 5:00 PM  Fees: Insurance, Self Pay, Sliding Fee   Phone: (854) 731-9766 Email: [email or web address] Website: [email or web address]     Mental health crisis care  5  [facility] Psychiatry Services Distance: 4.92 miles      COVID-19 Status: Regular  Operations   [address] [place] [address]  Language: English  Hours: Mon - Sun Open 24 Hours  Fees: Insurance, Self Pay, Sliding Fee   Phone: (314) 384-6700 Website: [email or web address]/[email or web address]/psychiatry/acute-psychiatry-services/     6  [facility] for Psychiatric Emergencies (COPE) Distance: 5.1 miles      COVID-19 Status: Regular Operations, COVID-19 Status: Phone/Virtual   [address] [place] [address]  Language: English, Rwandan, Equatorial Guinean  Hours: Mon - Sun Open 24 Hours  Fees: Free   Phone: (851) 835-8038 Email: [email or web address]. Website: [email or web address]://[email or web address]/residents/emergencies/mental-health-emergencies     Mental health support group  7  [facility] Distance: 3.11 miles      COVID-19 Status: Regular Operations, COVID-19 Status: Phone/Virtual   [address] [facility] [address] [place] [address]  Language: English  Hours: Mon - Fri 8:30 AM - 5:00 PM  Fees: Insurance, Self Pay   Phone: (642) 188-2219 Email: info@[employer] Website: http://www.[employer]/     8  [facility] - Alzheimer's Caregiver Support Group Distance: 3.22 miles      COVID-19 Status: Phone/Virtual   [address] [place] [address]  Language: English  Hours: Mon - Thu 9:30 AM - 3:00 PM  Fees: Free   Phone: (391) 983-7349 Email: info@[employer] Website: http://www.[employer].org/          Important Numbers & Websites       Emergency Services   911  City Services   311  Poison Control   (391) 406-6707  Suicide Prevention Lifeline   (229) 338-7832 (TALK)  Child Abuse Hotline   (976) 887-7676 (4-A-Child)  Sexual Assault Hotline   (944) 181-5203 (HOPE)  National Runaway Safeline   (381) 544-4969 (RUNAWAY)  All-Options Talkline   (718) 113-7798  Substance Abuse Referral   (520) 416-9103 (HELP)

## 2022-05-23 DIAGNOSIS — E11.49 DM TYPE 2 CAUSING NEUROLOGICAL DISEASE (H): ICD-10-CM

## 2022-05-23 LAB — HBA1C MFR BLD: 6.3 % (ref 4–6)

## 2022-05-23 PROCEDURE — 83036 HEMOGLOBIN GLYCOSYLATED A1C: CPT | Performed by: FAMILY MEDICINE

## 2022-05-23 PROCEDURE — 36415 COLL VENOUS BLD VENIPUNCTURE: CPT | Performed by: FAMILY MEDICINE

## 2022-05-24 ENCOUNTER — TRANSFERRED RECORDS (OUTPATIENT)
Dept: FAMILY MEDICINE | Facility: CLINIC | Age: 77
End: 2022-05-24

## 2022-05-24 LAB
ALBUMIN SERPL-MCNC: 4.6 G/DL (ref 3.7–4.7)
ALBUMIN/GLOB SERPL: 2.2 {RATIO} (ref 1.2–2.2)
ALP SERPL-CCNC: 51 IU/L (ref 44–121)
ALT SERPL-CCNC: 26 IU/L (ref 0–44)
AST SERPL-CCNC: 26 IU/L (ref 0–40)
BILIRUB SERPL-MCNC: 0.4 MG/DL (ref 0–1.2)
BUN SERPL-MCNC: 18 MG/DL (ref 8–27)
BUN/CREATININE RATIO: 16 (ref 10–24)
CALCIUM SERPL-MCNC: 9.3 MG/DL (ref 8.6–10.2)
CHLORIDE SERPLBLD-SCNC: 110 MMOL/L (ref 96–106)
CHOLEST SERPL-MCNC: 121 MG/DL (ref 100–199)
CREAT SERPL-MCNC: 1.11 MG/DL (ref 0.76–1.27)
EGFR: 69 ML/MIN/1.73
GLOBULIN, TOTAL: 2.1 G/DL (ref 1.5–4.5)
GLUCOSE SERPL-MCNC: 125 MG/DL (ref 65–99)
HDLC SERPL-MCNC: 29 MG/DL
LDL/HDL RATIO: 1.8 RATIO (ref 0–3.6)
LDLC SERPL CALC-MCNC: 52 MG/DL (ref 0–99)
POTASSIUM SERPL-SCNC: 4.7 MMOL/L (ref 3.5–5.2)
PROT SERPL-MCNC: 6.7 G/DL (ref 6–8.5)
SODIUM SERPL-SCNC: 148 MMOL/L (ref 134–144)
TOTAL CO2: 23 MMOL/L (ref 20–29)
TRIGL SERPL-MCNC: 251 MG/DL (ref 0–149)
TSH BLD-ACNC: 2.43 UIU/ML (ref 0.45–4.5)
VLDLC SERPL CALC-MCNC: 40 MG/DL (ref 5–40)

## 2022-05-24 NOTE — PROGRESS NOTES
5/23/22 faxed this office note to Erica Atten:  ERA frank @ [phone]    Pierre Roberts,   [facility]  [phone]

## 2022-05-25 DIAGNOSIS — E11.49 DM TYPE 2 CAUSING NEUROLOGICAL DISEASE (H): ICD-10-CM

## 2022-05-25 RX ORDER — METFORMIN HCL 500 MG
TABLET, EXTENDED RELEASE 24 HR ORAL
Qty: 90 TABLET | Refills: 1 | Status: SHIPPED | OUTPATIENT
Start: 2022-05-25 | End: 2022-11-21

## 2022-05-25 NOTE — RESULT ENCOUNTER NOTE
Dear Abhay,     I am writing to report that your included test results are as expected.  Most labs contain some results that are slightly outside of the normal range, I have reviewed each of these results and they require no changes at this time.    Sean Madrid MD

## 2022-06-07 ENCOUNTER — TRANSFERRED RECORDS (OUTPATIENT)
Dept: FAMILY MEDICINE | Facility: CLINIC | Age: 77
End: 2022-06-07

## 2022-06-12 DIAGNOSIS — E11.49 DM TYPE 2 CAUSING NEUROLOGICAL DISEASE (H): ICD-10-CM

## 2022-06-12 RX ORDER — ATORVASTATIN CALCIUM 20 MG/1
TABLET, FILM COATED ORAL
Qty: 90 TABLET | Refills: 1 | Status: SHIPPED | OUTPATIENT
Start: 2022-06-12 | End: 2022-09-15

## 2022-06-13 NOTE — RESULT ENCOUNTER NOTE
Dear Abhay,     I am writing to report that your included CT scan results are as expected.   Sean Madrid MD

## 2022-06-15 ENCOUNTER — TRANSFERRED RECORDS (OUTPATIENT)
Dept: FAMILY MEDICINE | Facility: CLINIC | Age: 77
End: 2022-06-15

## 2022-06-21 ENCOUNTER — TRANSFERRED RECORDS (OUTPATIENT)
Dept: FAMILY MEDICINE | Facility: CLINIC | Age: 77
End: 2022-06-21

## 2022-06-23 ENCOUNTER — OFFICE VISIT (OUTPATIENT)
Dept: FAMILY MEDICINE | Facility: CLINIC | Age: 77
End: 2022-06-23

## 2022-06-23 VITALS
DIASTOLIC BLOOD PRESSURE: 78 MMHG | BODY MASS INDEX: 33.63 KG/M2 | SYSTOLIC BLOOD PRESSURE: 114 MMHG | OXYGEN SATURATION: 97 % | HEART RATE: 91 BPM | WEIGHT: 234.4 LBS

## 2022-06-23 DIAGNOSIS — E11.49 DM TYPE 2 CAUSING NEUROLOGICAL DISEASE (H): ICD-10-CM

## 2022-06-23 DIAGNOSIS — I48.20 CHRONIC ATRIAL FIBRILLATION (H): ICD-10-CM

## 2022-06-23 DIAGNOSIS — S06.5XAA SUBDURAL HEMATOMA (H): Primary | ICD-10-CM

## 2022-06-23 DIAGNOSIS — R00.1 BRADYCARDIA: ICD-10-CM

## 2022-06-23 PROCEDURE — 99214 OFFICE O/P EST MOD 30 MIN: CPT | Performed by: FAMILY MEDICINE

## 2022-06-23 RX ORDER — METOPROLOL TARTRATE 25 MG/1
12.5 TABLET, FILM COATED ORAL
COMMUNITY
Start: 2022-06-17 | End: 2022-06-23

## 2022-06-23 RX ORDER — METOPROLOL TARTRATE 25 MG/1
37.5 TABLET, FILM COATED ORAL 2 TIMES DAILY
COMMUNITY
Start: 2022-06-23 | End: 2023-05-02

## 2022-06-23 RX ORDER — ACETAMINOPHEN 500 MG
500 TABLET ORAL
COMMUNITY
End: 2022-06-23

## 2022-06-23 RX ORDER — LEVETIRACETAM 1000 MG/1
500 TABLET ORAL DAILY
COMMUNITY
Start: 2022-06-17 | End: 2024-07-23

## 2022-06-23 NOTE — PROGRESS NOTES
Hospital Follow-up Visit:    Hospital/Nursing Home/IP Rehab Facility: [facility]  Date of Admission: 6/15/22  Date of Discharge: 6/17/22  Reason(s) for Admission: Subdural Hematoma (HRC), Chronic atrial fibrillation (HRC), Chronic anticoagulation, Primary hypertension (HRC), History of stroke with residual deficit, General weakness, Bradycardia, Confusion, Spell of dizziness    Was your hospitalization related to COVID-19? No   Problems taking medications regularly:  None  Medication changes since discharge: None  Problems adhering to non-medication therapy:  None    6/29 Heart doctor visit at the [facility].    7/21 with Neuro may have had a seizure in the hospital and is now on keppra.      Summary of hospitalization:  [facility] discharge summary reviewed  Diagnostic Tests/Treatments reviewed.  Follow up needed: none  Other Healthcare Providers Involved in Patient s Care:         None  Update since discharge: improved. ost Discharge Medication Reconciliation: discharge medications reconciled and changed, per note/orders.  Plan of care communicated with patient     Problem(s) Oriented visit      ROS:  General and Resp. completed and negative except as noted above     HISTORY:   reports current alcohol use of about 2.0 standard drinks of alcohol per week.   reports that he has quit smoking. He has never used smokeless tobacco.    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    EXAM:  APPEARANCE: = Relaxed and in no distress  Conj/Eyelids = noninjected and lids and lashes are without inflammation  PERRLA/Irises = Pupils Round Reactive to Light and Irisis without inflammation  Neck = No anterior or posterior adenopathy appreciated.  Thyroid = Not enlarged and no masses felt  Resp effort = Calm regular breathing  Breath Sounds = Good air movement with no rales or rhonchi in any lung fields  Heart Rate, Rythym, & sounds (no Murm)  = irregularly irregular rhythm  Carotid Art's = Pulses full and equal and no bruits appreciated  Abdomen = Soft, nontender, no masses, & bowel sounds in all quadrants  Liver/Spleen = Normal span and no splenomegaly noted  Digits and Nails = FROM in all finger joints, no nail dystrophy  Ext (edema) = No pretibial edema noted or elsewhere  Musculsktl =  Strength and ROM of major joints are within normal limits  SKIN = absent significant rashes or lesions   Recent/Remote Memory = Alert and Oriented x 3  Mood/Affect = Cooperative and interested      Assessment/Plan:  Abhay was seen today for hospital f/u, recheck medication, heart problem, neurologic problem and arthritis.    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)  As a direct cause of their history of diabetes they have the following Diabetic complications: cardiovascular disease  Most  recent A1C:     The last available A1C values from [facility] reference lab, Lab [facility]:  No components found for: [id number]     Lab Results   Component Value Date    A1C 6.3 05/23/2022    A1C 6.3 01/05/2022    A1C 6.1 03/02/2021    History   Smoking Status     Former Smoker   Smokeless Tobacco     Never  Used    @     Kidney studies:  Creatinine   Date Value Ref Range Status   05/23/2022 1.11 0.76 - 1.27 mg/dL Final   02/13/2021 1.15 0.66 - 1.25 mg/dL Final   06/27/2019 1.04 0.76 - 1.27 mg/dL Final   ]    GFR Estimate   Date Value Ref Range Status   02/13/2021 62 >60 mL/min/[1.73_m2] Final     Comment:     Non  GFR Calc  Starting 12/18/2018, serum creatinine based estimated GFR (eGFR) will be   calculated using the Chronic Kidney Disease Epidemiology Collaboration   (CKD-EPI) equation.                  No components found for: MICORALBUMINLC      GFR Estimate If Black   Date Value Ref Range Status   02/13/2021 72 >60 mL/min/[1.73_m2] Final     Comment:      GFR Calc  Starting 12/18/2018, serum creatinine based estimated GFR (eGFR) will be   calculated using the Chronic Kidney Disease Epidemiology Collaboration   (CKD-EPI) equation.       Medication (Note: This includes the hypertensive combination subclass to make sure to show all ACEI/ARB's.)     ACE Inhibitors       ACE/ARB NOT PRESCRIBED, INTENTIONAL,    Please choose reason not prescribed, below    Angiotensin II Receptor Antagonists       ACE/ARB NOT PRESCRIBED, INTENTIONAL,    Please choose reason not prescribed, below                 Discussed importance in compliance in all areas of diabetic control including diet, routine BS checks, absolute medication compliance, laboratory monitoring, and attending regular follow up appointments as ordered.  Reviewed that failure to comply with instructions regarding diabetes will lead to a greater chance of poor diabetic control and therefore a greater chance of diabetes related complications such as CAD, CVA, PVD, and retinopathy/neuropathy/nephropathy.  We today managed prescriptions with refills ensured to ensure appropriate availability of current medications.  Next follow up will be in 3 months.        COUNSELING:   reports that he has quit smoking. He has never used smokeless  "tobacco.    Estimated body mass index is 33.63 kg/m  as calculated from the following:    Height as of 5/20/22: 1.778 m (5' 10\").    Weight as of this encounter: 106.3 kg (234 lb 6.4 oz).   Weight management plan: Discussed healthy diet and exercise guidelines    Appropriate preventive services were discussed with this patient, including applicable screening as appropriate for cardiovascular disease, diabetes, osteopenia/osteoporosis, and glaucoma.  As appropriate for age/gender, discussed screening for colorectal cancer, prostate cancer, breast cancer, and cervical cancer. Checklist reviewing preventive services available has been given to the patient.    Reviewed patients plan of care and provided an AVS. The Basic Care Plan (routine screening as documented in Health Maintenance) for Abhay meets the Care Plan requirement. This Care Plan has been established and reviewed with the  Patient.      The following health maintenance items are reviewed in Epic and correct as of today:  Health Maintenance   Topic Date Due     DTAP/TDAP/TD IMMUNIZATION (1 - Tdap) Never done     ZOSTER IMMUNIZATION (1 of 2) Never done     LUNG CANCER SCREENING  Never done     PHQ-9  11/20/2022     A1C  11/23/2022     MICROALBUMIN  02/18/2023     MEDICARE ANNUAL WELLNESS VISIT  05/20/2023     DIABETIC FOOT EXAM  05/20/2023     FALL RISK ASSESSMENT  05/20/2023     BMP  05/23/2023     LIPID  05/23/2023     EYE EXAM  06/07/2023     COLORECTAL CANCER SCREENING  02/24/2025     ADVANCE CARE PLANNING  05/20/2027     HEPATITIS C SCREENING  Completed     DEPRESSION ACTION PLAN  Completed     INFLUENZA VACCINE  Completed     Pneumococcal Vaccine: 65+ Years  Completed     COVID-19 Vaccine  Completed     IPV IMMUNIZATION  Aged Out     MENINGITIS IMMUNIZATION  Aged Out       Sean Madrid  [facility]  For any issues my office # is [phone]              "

## 2022-08-09 ENCOUNTER — OFFICE VISIT (OUTPATIENT)
Dept: FAMILY MEDICINE | Facility: CLINIC | Age: 77
End: 2022-08-09

## 2022-08-09 VITALS
WEIGHT: 235 LBS | OXYGEN SATURATION: 97 % | SYSTOLIC BLOOD PRESSURE: 118 MMHG | BODY MASS INDEX: 33.72 KG/M2 | HEART RATE: 87 BPM | DIASTOLIC BLOOD PRESSURE: 74 MMHG

## 2022-08-09 DIAGNOSIS — I63.9 NEUROLOGIC DEFICIT DUE TO ACUTE ISCHEMIC STROKE (H): ICD-10-CM

## 2022-08-09 DIAGNOSIS — Z95.818 PRESENCE OF WATCHMAN LEFT ATRIAL APPENDAGE CLOSURE DEVICE: ICD-10-CM

## 2022-08-09 DIAGNOSIS — R29.818 NEUROLOGIC DEFICIT DUE TO ACUTE ISCHEMIC STROKE (H): ICD-10-CM

## 2022-08-09 DIAGNOSIS — E11.49 DM TYPE 2 CAUSING NEUROLOGICAL DISEASE (H): Primary | ICD-10-CM

## 2022-08-09 PROBLEM — Z86.79 HISTORY OF SUBDURAL HEMORRHAGE: Status: ACTIVE | Noted: 2021-10-22

## 2022-08-09 PROCEDURE — 99214 OFFICE O/P EST MOD 30 MIN: CPT | Performed by: FAMILY MEDICINE

## 2022-08-09 RX ORDER — GABAPENTIN 600 MG/1
300 TABLET ORAL 2 TIMES DAILY
COMMUNITY
Start: 2022-02-10 | End: 2022-12-23

## 2022-08-09 NOTE — PROGRESS NOTES
Problem(s) Oriented visit        SUBJECTIVE:                                                    Abhay Taylor is a 77 year old male who presents to clinic today for the following health issues :  DC sum reviewed from [facility],  Out of the hospital on 8/3  Feeling well except for the afib  1. Presence of Watchman left atrial appendage closure device  Notes reviewed     2. 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 Neurologic deficit due to acute ischemic stroke (H)  No change.       Problem list, Medication list, Allergies, and Medical/Social/Surgical histories reviewed in EPIC and updated as appropriate.   Additional history: as documented    ROS:  General and Resp. 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)     Presbycusis of both ears     Peripheral polyneuropathy     Major depression, recurrent (H)     History of subdural hemorrhage     Traumatic brain injury with loss of consciousness, sequela (H)     Presence of Watchman left atrial appendage closure device     Past Surgical History:   Procedure Laterality Date     EP ABLATION / EP STUDIES       RELEASE CARPAL TUNNEL BILATERAL         Social History     Tobacco Use     Smoking status: Former Smoker     Smokeless tobacco: Never Used   Substance Use Topics     Alcohol use: Yes     Alcohol/week: 2.0 standard drinks     Types: 2 Standard drinks or equivalent per week     Family History   Problem Relation Age of Onset     Colon Cancer Father            OBJECTIVE:                                                    /74   Pulse 87   Wt  106.6 kg (235 lb)   SpO2 97%   BMI 33.72 kg/m    Body mass index is 33.72 kg/m .   APPEARANCE: = Relaxed and in no distress  Thyroid = Not enlarged and no masses felt  Resp effort = Calm regular breathing     ASSESSMENT/PLAN:                                                        Abhay was seen today for procedure.    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    The following health maintenance items are reviewed in Epic and correct as of today:  Health Maintenance   Topic Date Due     DTAP/TDAP/TD IMMUNIZATION (1 - Tdap) Never done     ZOSTER IMMUNIZATION (1 of 2) Never done     LUNG CANCER SCREENING  Never done     INFLUENZA VACCINE (1) 09/01/2022     PHQ-9  11/20/2022     A1C  11/23/2022     MICROALBUMIN  02/18/2023     MEDICARE ANNUAL WELLNESS VISIT  05/20/2023     DIABETIC FOOT EXAM  05/20/2023     FALL RISK ASSESSMENT  05/20/2023     BMP  05/23/2023     LIPID  05/23/2023     EYE EXAM  06/07/2023     COLORECTAL CANCER SCREENING  02/24/2025     ADVANCE CARE PLANNING  05/20/2027     HEPATITIS C SCREENING  Completed     DEPRESSION ACTION PLAN  Completed     Pneumococcal Vaccine: 65+ Years  Completed     COVID-19 Vaccine  Completed     IPV IMMUNIZATION  Aged Out     MENINGITIS IMMUNIZATION  Aged Out       Sean Madrid MD  [facility]  [facility]  [phone]    For any issues my office # is [phone]

## 2022-09-07 ENCOUNTER — TRANSFERRED RECORDS (OUTPATIENT)
Dept: FAMILY MEDICINE | Facility: CLINIC | Age: 77
End: 2022-09-07

## 2022-09-15 ENCOUNTER — TELEPHONE (OUTPATIENT)
Dept: FAMILY MEDICINE | Facility: CLINIC | Age: 77
End: 2022-09-15

## 2022-09-15 DIAGNOSIS — U07.1 CLINICAL DIAGNOSIS OF COVID-19: Primary | ICD-10-CM

## 2022-09-16 NOTE — PATIENT INSTRUCTIONS

## 2022-09-16 NOTE — TELEPHONE ENCOUNTER
Will hold atorvastatin and colchicine, and anticoagulant (now has watchman device)  Shared decision making with wife through phone call.

## 2022-09-29 ENCOUNTER — MEDICAL CORRESPONDENCE (OUTPATIENT)
Dept: FAMILY MEDICINE | Facility: CLINIC | Age: 77
End: 2022-09-29

## 2022-10-07 ENCOUNTER — TRANSFERRED RECORDS (OUTPATIENT)
Dept: FAMILY MEDICINE | Facility: CLINIC | Age: 77
End: 2022-10-07

## 2022-10-22 DIAGNOSIS — F32.1 CURRENT MODERATE EPISODE OF MAJOR DEPRESSIVE DISORDER WITHOUT PRIOR EPISODE (H): ICD-10-CM

## 2022-11-21 DIAGNOSIS — E11.49 DM TYPE 2 CAUSING NEUROLOGICAL DISEASE (H): ICD-10-CM

## 2022-11-21 NOTE — TELEPHONE ENCOUNTER
METFORMIN     LOV: 8/9/22    *DUE FOR A1C/DM CHECK, PLEASE CALL PT TO SCHEDULE.    Hemoglobin A1C   Date Value Ref Range Status   05/23/2022 6.3 (A) 4.0 - 6.0 % Final

## 2022-11-22 DIAGNOSIS — E11.49 DM TYPE 2 CAUSING NEUROLOGICAL DISEASE (H): ICD-10-CM

## 2022-11-22 RX ORDER — METFORMIN HCL 500 MG
TABLET, EXTENDED RELEASE 24 HR ORAL
Qty: 30 TABLET | Refills: 1 | Status: SHIPPED | OUTPATIENT
Start: 2022-11-22 | End: 2022-11-22

## 2022-11-22 NOTE — TELEPHONE ENCOUNTER
90 day supply please of   Metformin  LOV 8/9/22  Hemoglobin A1C   Date Value Ref Range Status   05/23/2022 6.3 (A) 4.0 - 6.0 % Final

## 2022-11-23 RX ORDER — METFORMIN HCL 500 MG
TABLET, EXTENDED RELEASE 24 HR ORAL
Qty: 90 TABLET | Refills: 1 | Status: SHIPPED | OUTPATIENT
Start: 2022-11-23 | End: 2022-12-22

## 2022-12-19 ENCOUNTER — OFFICE VISIT (OUTPATIENT)
Dept: FAMILY MEDICINE | Facility: CLINIC | Age: 77
End: 2022-12-19

## 2022-12-19 VITALS
WEIGHT: 248.3 LBS | OXYGEN SATURATION: 98 % | DIASTOLIC BLOOD PRESSURE: 79 MMHG | HEART RATE: 87 BPM | RESPIRATION RATE: 16 BRPM | SYSTOLIC BLOOD PRESSURE: 139 MMHG | BODY MASS INDEX: 35.55 KG/M2 | HEIGHT: 70 IN

## 2022-12-19 DIAGNOSIS — E66.01 MORBID OBESITY (H): ICD-10-CM

## 2022-12-19 DIAGNOSIS — R05.2 SUBACUTE COUGH: ICD-10-CM

## 2022-12-19 DIAGNOSIS — I70.0 ATHEROSCLEROSIS OF AORTA (H): ICD-10-CM

## 2022-12-19 DIAGNOSIS — E11.49 DM TYPE 2 CAUSING NEUROLOGICAL DISEASE (H): Primary | ICD-10-CM

## 2022-12-19 DIAGNOSIS — I48.20 CHRONIC ATRIAL FIBRILLATION (H): ICD-10-CM

## 2022-12-19 DIAGNOSIS — E11.42 DIABETIC POLYNEUROPATHY ASSOCIATED WITH TYPE 2 DIABETES MELLITUS (H): ICD-10-CM

## 2022-12-19 DIAGNOSIS — Z95.818 PRESENCE OF WATCHMAN LEFT ATRIAL APPENDAGE CLOSURE DEVICE: ICD-10-CM

## 2022-12-19 DIAGNOSIS — G40.909 SEIZURE DISORDER (H): ICD-10-CM

## 2022-12-19 LAB — HBA1C MFR BLD: 6.3 % (ref 4–6)

## 2022-12-19 PROCEDURE — 99214 OFFICE O/P EST MOD 30 MIN: CPT | Performed by: FAMILY MEDICINE

## 2022-12-19 PROCEDURE — 36415 COLL VENOUS BLD VENIPUNCTURE: CPT | Performed by: FAMILY MEDICINE

## 2022-12-19 PROCEDURE — 83036 HEMOGLOBIN GLYCOSYLATED A1C: CPT | Performed by: FAMILY MEDICINE

## 2022-12-19 RX ORDER — CODEINE PHOSPHATE AND GUAIFENESIN 10; 100 MG/5ML; MG/5ML
1-2 SOLUTION ORAL EVERY 4 HOURS PRN
Qty: 473 ML | Refills: 0 | Status: SHIPPED | OUTPATIENT
Start: 2022-12-19 | End: 2024-06-28

## 2022-12-19 ASSESSMENT — ANXIETY QUESTIONNAIRES
2. NOT BEING ABLE TO STOP OR CONTROL WORRYING: SEVERAL DAYS
GAD7 TOTAL SCORE: 3
IF YOU CHECKED OFF ANY PROBLEMS ON THIS QUESTIONNAIRE, HOW DIFFICULT HAVE THESE PROBLEMS MADE IT FOR YOU TO DO YOUR WORK, TAKE CARE OF THINGS AT HOME, OR GET ALONG WITH OTHER PEOPLE: NOT DIFFICULT AT ALL
GAD7 TOTAL SCORE: 3
5. BEING SO RESTLESS THAT IT IS HARD TO SIT STILL: NOT AT ALL
3. WORRYING TOO MUCH ABOUT DIFFERENT THINGS: SEVERAL DAYS
6. BECOMING EASILY ANNOYED OR IRRITABLE: SEVERAL DAYS
1. FEELING NERVOUS, ANXIOUS, OR ON EDGE: NOT AT ALL
7. FEELING AFRAID AS IF SOMETHING AWFUL MIGHT HAPPEN: NOT AT ALL

## 2022-12-19 ASSESSMENT — PATIENT HEALTH QUESTIONNAIRE - PHQ9
SUM OF ALL RESPONSES TO PHQ QUESTIONS 1-9: 10
5. POOR APPETITE OR OVEREATING: NOT AT ALL

## 2022-12-19 NOTE — PROGRESS NOTES
Problem(s) Oriented visit        SUBJECTIVE:                                                    Abhay Taylor is a 77 year old male who presents to clinic today for the following health issues :      1. 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  Tolerating these current diabetic meds:  Medications: yes:     Diabetes Medication(s)     Biguanides       metFORMIN (GLUCOPHAGE XR) 500 MG 24 hr tablet    TAKE 1 TABLET BY MOUTH DAILY WITH DINNER          No significnat or regular episodes of hypo or hyperglycemia  Medication compliance: compliant most of the time  Diabetic diet compliance: compliant most of the time  Diabetic ROS: no polyuria or polydipsia, no chest pain, dyspnea or TIA's, no numbness, tingling or pain in extremities    Home blood sugar monitoring: are performed regularly, Review of patients self blood glucose monitoring shows fasting most always < 130 and post prandial average under 170.  As a direct cause of their history of diabetes they have the following Diabetic complications: neurologic    Most  recent A1C: Smoking: BP:   The last 5 available A1C values from [facility] [place], [facility]:  No components found for: [id number]     Lab Results   Component Value Date    A1C 6.3 12/19/2022    A1C 6.3 05/23/2022    A1C 6.3 01/05/2022    History   Smoking Status     Former   Smokeless Tobacco     Never    BP Readings from Last 1 Encounters:   12/19/22 139/79        Kidney studies:  Creatinine   Date Value Ref Range Status   05/23/2022 1.11 0.76 - 1.27 mg/dL Final   02/13/2021 1.15 0.66 - 1.25 mg/dL Final   06/27/2019 1.04 0.76 - 1.27 mg/dL Final   ]    GFR Estimate   Date Value Ref Range Status   02/13/2021 62 >60 mL/min/[1.73_m2] Final     Comment:     Non  GFR Calc  Starting 12/18/2018, serum creatinine based estimated GFR (eGFR) will be   calculated using the Chronic Kidney Disease Epidemiology Collaboration   (CKD-EPI) equation.                   No components found for: MICORALBUMINLC      GFR Estimate If Black   Date Value Ref Range Status   02/13/2021 72 >60 mL/min/[1.73_m2] Final     Comment:      GFR Calc  Starting 12/18/2018, serum creatinine based estimated GFR (eGFR) will be   calculated using the Chronic Kidney Disease Epidemiology Collaboration   (CKD-EPI) equation.       Medication (Note: This includes the hypertensive combination subclass to make sure to show all ACEI/ARB's.)     ACE Inhibitors       ACE/ARB NOT PRESCRIBED, INTENTIONAL,    Please choose reason not prescribed, below     Patient not taking: Reported on 8/9/2022    Angiotensin II Receptor Antagonists       ACE/ARB NOT PRESCRIBED, INTENTIONAL,    Please choose reason not prescribed, below     Patient not taking: Reported on 8/9/2022                     - Hemoglobin A1C (RMG)  - VENOUS COLLECTION    2. Seizure disorder (H)  No recent seizures    3. Morbid obesity (H)  The patient has had a history of ongoing obesity.  Reviewed the weigth curves.   Their current BMI is:  Body mass index is 35.63 kg/m .  Reviewed previous attempts at weight loss which have not been successful in producing prolonged weigth loss.   Discussed current eating and exercise habits.     Reviewed weights in chart:  Wt Readings from Last 10 Encounters:   12/19/22 112.6 kg (248 lb 4.8 oz)   08/09/22 106.6 kg (235 lb)   06/23/22 106.3 kg (234 lb 6.4 oz)   05/20/22 109.6 kg (241 lb 9.6 oz)   01/05/22 107.5 kg (237 lb)   10/22/21 104.3 kg (230 lb)   10/01/21 100.4 kg (221 lb 6.4 oz)   03/22/21 107.6 kg (237 lb 3.2 oz)   02/13/21 105.2 kg (232 lb)   03/31/20 103.4 kg (228 lb)     Seizure disease on Keppra    Found to have Atherosclerosis in Aorta on CT of ABD  11/2021,  On Statin       Problem list, Medication list, Allergies, and Medical/Social/Surgical histories reviewed in [facility] and updated as appropriate.   Additional history: as documented    ROS:  General and Resp. 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)     Presbycusis of both ears     Peripheral polyneuropathy     Major depression, recurrent (H)     History of subdural hemorrhage     Traumatic brain injury with loss of consciousness, sequela (H)     Presence of Watchman left atrial appendage closure device     Seizure disorder (H)     Morbid obesity (H)     Past Surgical History:   Procedure Laterality Date     EP ABLATION / EP STUDIES       RELEASE CARPAL TUNNEL BILATERAL         Social History     Tobacco Use     Smoking status: Former     Smokeless tobacco: Never   Substance Use Topics     Alcohol use: Yes     Alcohol/week: 2.0 standard drinks     Types: 2 Standard drinks or equivalent per week     Family History   Problem Relation Age of Onset     Colon Cancer Father            OBJECTIVE:                                                    /79   Pulse 87   Resp 16   Ht 1.778 m (5' 10\")   Wt 112.6 kg (248 lb 4.8 oz)   SpO2 98%   BMI 35.63 kg/m    Body mass index is 35.63 kg/m .   APPEARANCE: = Relaxed and in no distress  Oropharynx = No leukoplakia, No injection to the tissues, Normal Uvula  Resp effort = Calm regular breathing  Heart Rate, Rhythm, & sounds (no Murm)  = Regular rate and rhythm with no S3, S4, or murmur appreciated.     ASSESSMENT/PLAN:                                                        Abhay was seen today for diabetes, cough and head injury.    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  Reviewed weight patterns.   Obesity as a " biological preventable and treatable disease, which is associated with significantly increased risk of many acute and chronic health conditions.   Obesity has now been recognized as a chronic disease by the American Medical Association.  Obesity has serious co-morbidities associated with obesity including increased risk for hypertension, stroke, coronary artery disease, dyslipidemia, Type II diabetes, depression, sleep apnea, cancers of the colon, breast and endometrium, obstructive sleep apnea, osteoarthritis and female infertility.  Recommended regular aerobic exercise, recommended improved diet aiming at lowering amount of processed foods, lower sugars, moderation/reduction of simple carbs and fat in the diet, establishing more regular meal times, always eating breakfast, front loading some of the calories and adding more protein to the diet.         Subacute cough  -     guaiFENesin-codeine (ROBITUSSIN AC) 100-10 MG/5ML solution; Take 5-10 mLs by mouth every 4 hours as needed for cough    >30 min spent with patient, greater than 50% spent on discussion/education/planning, etc. 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 Diagnoses of Seizure disorder (H), Morbid obesity (H), and Subacute cough were also pertinent to this visit.        Work on weight loss  There are no Patient Instructions on file for this visit.    The following health maintenance items are reviewed in Epic and correct as of today:  Health Maintenance   Topic Date Due     DTAP/TDAP/TD IMMUNIZATION (1 - Tdap) Never done     ZOSTER IMMUNIZATION (1 of 2) Never done     LUNG CANCER SCREENING  Never done     COVID-19 Vaccine (5 - Booster for Moderna series) 06/17/2022     PHQ-9  11/20/2022     MICROALBUMIN  02/18/2023     MEDICARE ANNUAL WELLNESS VISIT  05/20/2023     DIABETIC FOOT EXAM  05/20/2023     FALL RISK ASSESSMENT  05/20/2023     BMP  05/23/2023     LIPID  05/23/2023     EYE EXAM  06/07/2023     A1C   06/19/2023     COLORECTAL CANCER SCREENING  02/24/2025     ADVANCE CARE PLANNING  05/20/2027     HEPATITIS C SCREENING  Completed     DEPRESSION ACTION PLAN  Completed     INFLUENZA VACCINE  Completed     Pneumococcal Vaccine: 65+ Years  Completed     IPV IMMUNIZATION  Aged Out     MENINGITIS IMMUNIZATION  Aged Out       Sean Madrid MD  [facility]  [phone]    For any issues my office # is [phone]

## 2022-12-21 DIAGNOSIS — E11.49 DM TYPE 2 CAUSING NEUROLOGICAL DISEASE (H): ICD-10-CM

## 2022-12-22 RX ORDER — METFORMIN HCL 500 MG
TABLET, EXTENDED RELEASE 24 HR ORAL
Qty: 90 TABLET | Refills: 1 | Status: SHIPPED | OUTPATIENT
Start: 2022-12-22 | End: 2023-06-30

## 2022-12-23 ENCOUNTER — TELEPHONE (OUTPATIENT)
Dept: FAMILY MEDICINE | Facility: CLINIC | Age: 77
End: 2022-12-23

## 2022-12-23 DIAGNOSIS — E11.42 DIABETIC POLYNEUROPATHY ASSOCIATED WITH TYPE 2 DIABETES MELLITUS (H): Primary | ICD-10-CM

## 2022-12-23 RX ORDER — GABAPENTIN 600 MG/1
900 TABLET ORAL 2 TIMES DAILY
Qty: 270 TABLET | Refills: 1 | Status: SHIPPED | OUTPATIENT
Start: 2022-12-23 | End: 2024-01-17

## 2022-12-23 NOTE — TELEPHONE ENCOUNTER
Patient wife called clinic requesting refill gabapentin. Per wife patient takes 1.5 tab BID. Per Dr Madrid prescription sent to pharmacy. Laurel Valenzuela     Fill history per [place]

## 2023-02-20 DIAGNOSIS — Z76.0 ENCOUNTER FOR MEDICATION REFILL: Primary | ICD-10-CM

## 2023-02-20 NOTE — TELEPHONE ENCOUNTER
Wife called clinic requesting refill atorvastatin 20mg. Medication was removed from medication list when patient had Covid in September 2022. Atorvastatin was to be held and not taken while on Paxlovid but was inadvertently discontinued from list. Prescription refill entered and routed to Dr Madrid for review. Laurel Valenzuela

## 2023-02-21 RX ORDER — ATORVASTATIN CALCIUM 20 MG/1
20 TABLET, FILM COATED ORAL DAILY
Qty: 90 TABLET | Refills: 0 | Status: SHIPPED | OUTPATIENT
Start: 2023-02-21 | End: 2023-05-24

## 2023-03-27 ENCOUNTER — OFFICE VISIT (OUTPATIENT)
Dept: FAMILY MEDICINE | Facility: CLINIC | Age: 78
End: 2023-03-27

## 2023-03-27 VITALS
OXYGEN SATURATION: 94 % | RESPIRATION RATE: 16 BRPM | TEMPERATURE: 97.5 F | BODY MASS INDEX: 34.36 KG/M2 | SYSTOLIC BLOOD PRESSURE: 115 MMHG | HEART RATE: 74 BPM | WEIGHT: 240 LBS | HEIGHT: 70 IN | DIASTOLIC BLOOD PRESSURE: 79 MMHG

## 2023-03-27 DIAGNOSIS — I48.20 CHRONIC ATRIAL FIBRILLATION (H): ICD-10-CM

## 2023-03-27 DIAGNOSIS — D68.69 SECONDARY HYPERCOAGULABLE STATE (H): ICD-10-CM

## 2023-03-27 DIAGNOSIS — I70.0 ATHEROSCLEROSIS OF AORTA (H): ICD-10-CM

## 2023-03-27 DIAGNOSIS — F33.41 RECURRENT MAJOR DEPRESSIVE DISORDER, IN PARTIAL REMISSION (H): ICD-10-CM

## 2023-03-27 DIAGNOSIS — E11.49 DM TYPE 2 CAUSING NEUROLOGICAL DISEASE (H): ICD-10-CM

## 2023-03-27 DIAGNOSIS — G40.909 SEIZURE DISORDER (H): ICD-10-CM

## 2023-03-27 DIAGNOSIS — J40 BRONCHITIS: Primary | ICD-10-CM

## 2023-03-27 DIAGNOSIS — Z95.818 PRESENCE OF WATCHMAN LEFT ATRIAL APPENDAGE CLOSURE DEVICE: ICD-10-CM

## 2023-03-27 DIAGNOSIS — E66.01 MORBID OBESITY (H): ICD-10-CM

## 2023-03-27 DIAGNOSIS — S06.9X9S TRAUMATIC BRAIN INJURY WITH LOSS OF CONSCIOUSNESS, SEQUELA (H): ICD-10-CM

## 2023-03-27 PROCEDURE — 99214 OFFICE O/P EST MOD 30 MIN: CPT | Performed by: FAMILY MEDICINE

## 2023-03-27 RX ORDER — AZITHROMYCIN 250 MG/1
TABLET, FILM COATED ORAL
Qty: 6 TABLET | Refills: 0 | Status: SHIPPED | OUTPATIENT
Start: 2023-03-27 | End: 2023-04-01

## 2023-03-27 RX ORDER — CLOPIDOGREL BISULFATE 75 MG/1
TABLET ORAL
COMMUNITY
Start: 2023-03-11 | End: 2024-07-23

## 2023-03-27 NOTE — PROGRESS NOTES
"Problem(s) Oriented visit        SUBJECTIVE:                                                    Abhay Taylor is a 78 year old male who presents to clinic today for the following health issues :    Problem list, Medication list, Allergies, and Medical/Social/Surgical histories reviewed in EPIC and updated as appropriate.   Additional history: as documented    ROS:  General and Resp. 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)     Presbycusis of both ears     Peripheral polyneuropathy     Major depression, recurrent (H)     History of subdural hemorrhage     Traumatic brain injury with loss of consciousness, sequela (H)     Presence of Watchman left atrial appendage closure device     Seizure disorder (H)     Morbid obesity (H)     Atherosclerosis of aorta (H)     Past Surgical History:   Procedure Laterality Date     EP ABLATION / EP STUDIES       RELEASE CARPAL TUNNEL BILATERAL         Social History     Tobacco Use     Smoking status: Former     Smokeless tobacco: Never   Substance Use Topics     Alcohol use: Yes     Alcohol/week: 2.0 standard drinks     Types: 2 Standard drinks or equivalent per week     Family History   Problem Relation Age of Onset     Colon Cancer Father            OBJECTIVE:                                                    /79   Pulse 74   Temp 97.5  F (36.4  C) (Temporal)   Resp 16   Ht 1.778 m (5' 10\")   Wt 108.9 kg (240 lb)   SpO2 94%   BMI 34.44 kg/m    Body mass index is 34.44 kg/m .   APPEARANCE: = alert and mild distress  Ears/Nose = External structures and Nares have usual shape and form  Lips/Teeth/Gums = No lesions seen, good dentition, and gums seem healthy  Neck = No anterior or posterior adenopathy " appreciated.  Resp effort = Calm regular breathing  Breath Sounds =  scattered rhonchi  Heart Rate, Rhythm, & sounds (no Murm)  = Regular rate and rhythm with no S3, S4, or murmur appreciated.     ASSESSMENT/PLAN:                                                        Abhay was seen today for cough and diarrhea.    Diagnoses and all orders for this visit:    Bronchitis  -     azithromycin (ZITHROMAX) 250 MG tablet; Take 2 tablets (500 mg) by mouth daily for 1 day, THEN 1 tablet (250 mg) daily for 4 days.    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    The following health maintenance items are reviewed in Epic and correct as of today:  Health Maintenance   Topic Date Due     ZOSTER IMMUNIZATION (1 of 2) Never done     LUNG CANCER SCREENING  Never done     MICROALBUMIN  02/18/2023     MEDICARE ANNUAL WELLNESS VISIT  05/20/2023     DIABETIC FOOT EXAM  05/20/2023     FALL RISK ASSESSMENT  05/20/2023     BMP  05/23/2023     LIPID  05/23/2023     EYE EXAM  06/07/2023     A1C  06/19/2023     PHQ-9  06/19/2023     COLORECTAL CANCER SCREENING  02/24/2025     ADVANCE CARE PLANNING  05/20/2027     DTAP/TDAP/TD IMMUNIZATION (2 - Td or Tdap) 12/22/2032     HEPATITIS C SCREENING  Completed     DEPRESSION ACTION PLAN  Completed     INFLUENZA VACCINE  Completed     Pneumococcal Vaccine: 65+ Years  Completed     COVID-19  Vaccine  Completed     IPV IMMUNIZATION  Aged Out     MENINGITIS IMMUNIZATION  Aged Out       Sean Madrid MD  [facility]  [phone]    For any issues my office # is [phone]

## 2023-03-28 ENCOUNTER — TRANSFERRED RECORDS (OUTPATIENT)
Dept: FAMILY MEDICINE | Facility: CLINIC | Age: 78
End: 2023-03-28

## 2023-04-09 DIAGNOSIS — F32.1 CURRENT MODERATE EPISODE OF MAJOR DEPRESSIVE DISORDER WITHOUT PRIOR EPISODE (H): ICD-10-CM

## 2023-05-01 DIAGNOSIS — G89.29 CHRONIC PAIN OF LEFT KNEE: ICD-10-CM

## 2023-05-01 DIAGNOSIS — M25.562 CHRONIC PAIN OF LEFT KNEE: ICD-10-CM

## 2023-05-01 RX ORDER — PSEUDOEPHED/ACETAMINOPH/DIPHEN 30MG-500MG
TABLET ORAL
Qty: 180 TABLET | Refills: 3 | Status: SHIPPED | OUTPATIENT
Start: 2023-05-01 | End: 2024-07-01

## 2023-05-02 ENCOUNTER — OFFICE VISIT (OUTPATIENT)
Dept: FAMILY MEDICINE | Facility: CLINIC | Age: 78
End: 2023-05-02

## 2023-05-02 VITALS
SYSTOLIC BLOOD PRESSURE: 126 MMHG | OXYGEN SATURATION: 96 % | BODY MASS INDEX: 36.04 KG/M2 | HEART RATE: 86 BPM | WEIGHT: 251.2 LBS | DIASTOLIC BLOOD PRESSURE: 72 MMHG

## 2023-05-02 DIAGNOSIS — M10.9 ACUTE GOUT OF RIGHT ANKLE, UNSPECIFIED CAUSE: Primary | ICD-10-CM

## 2023-05-02 PROCEDURE — 99214 OFFICE O/P EST MOD 30 MIN: CPT | Performed by: FAMILY MEDICINE

## 2023-05-02 RX ORDER — METOPROLOL TARTRATE 50 MG
50 TABLET ORAL
COMMUNITY
Start: 2023-04-01 | End: 2024-01-17

## 2023-05-02 RX ORDER — COLCHICINE 0.6 MG/1
TABLET ORAL
Qty: 30 TABLET | Refills: 0 | Status: SHIPPED | OUTPATIENT
Start: 2023-05-02 | End: 2023-12-19

## 2023-05-02 NOTE — PROGRESS NOTES
Fred Blank is a 78 year old patient who presents to clinic for evaluation.  4 days of intense right ankle pain.  Similar to previous gout attacks.  No injury.  He had stopped his allopurinol.  Pharmacy gave him #8 colchicine without a prescription so he started that which has helped significantly.  No fever or chills        Review of Systems   Constitutional, HEENT, cardiovascular, pulmonary, GI, , musculoskeletal, neuro, skin, endocrine and psych systems are negative, except as otherwise noted.      Objective    /72   Pulse 86   Wt 113.9 kg (251 lb 3.2 oz)   SpO2 96%   BMI 36.04 kg/m      General: Well appearing, NAD  MSK: right ankle diffusely swollen, mild erythema and warmth.  Exquisitely tender, especially medially.  Painful ROM.  No skin breakdown  Psych: normal mood and affect          Acute gout of right ankle, unspecified cause  - cont colchicine until resolved  - fluids, rest  - follow up 1 month to check UA and discuss restarting allopurinol with Dr Madrid  - follow up acutely if worse  - colchicine (COLCYRS) 0.6 MG tablet; TAKE 2 PILLS BY MOUTH ONCE, THEN 1 TAB AN HOUR LATER.  THEN 1 TAB TWICE DAILY UNTIL RESOLVED

## 2023-05-24 DIAGNOSIS — Z76.0 ENCOUNTER FOR MEDICATION REFILL: ICD-10-CM

## 2023-05-24 RX ORDER — ATORVASTATIN CALCIUM 20 MG/1
TABLET, FILM COATED ORAL
Qty: 90 TABLET | Refills: 0 | Status: SHIPPED | OUTPATIENT
Start: 2023-05-24 | End: 2023-10-11

## 2023-05-31 ENCOUNTER — OFFICE VISIT (OUTPATIENT)
Dept: FAMILY MEDICINE | Facility: CLINIC | Age: 78
End: 2023-05-31

## 2023-05-31 VITALS
DIASTOLIC BLOOD PRESSURE: 57 MMHG | HEART RATE: 60 BPM | WEIGHT: 252.3 LBS | BODY MASS INDEX: 36.12 KG/M2 | OXYGEN SATURATION: 96 % | HEIGHT: 70 IN | SYSTOLIC BLOOD PRESSURE: 100 MMHG

## 2023-05-31 DIAGNOSIS — M10.9 ACUTE GOUT OF RIGHT ANKLE, UNSPECIFIED CAUSE: ICD-10-CM

## 2023-05-31 DIAGNOSIS — E11.49 DM TYPE 2 CAUSING NEUROLOGICAL DISEASE (H): Primary | ICD-10-CM

## 2023-05-31 DIAGNOSIS — M1A.09X0 IDIOPATHIC CHRONIC GOUT OF MULTIPLE SITES WITHOUT TOPHUS: ICD-10-CM

## 2023-05-31 LAB
A/C RATIO (RMG): ABNORMAL
ALBUMIN- RMG: 80 (ref 0–10)
HBA1C MFR BLD: 6.8 % (ref 4–6)
INTERPRETATION: ABNORMAL
URINE CREATININE - RMG: 200 (ref 0–300)

## 2023-05-31 PROCEDURE — 36415 COLL VENOUS BLD VENIPUNCTURE: CPT | Performed by: FAMILY MEDICINE

## 2023-05-31 PROCEDURE — 99207 PR FOOT EXAM NO CHARGE: CPT | Performed by: FAMILY MEDICINE

## 2023-05-31 PROCEDURE — 83036 HEMOGLOBIN GLYCOSYLATED A1C: CPT | Performed by: FAMILY MEDICINE

## 2023-05-31 PROCEDURE — 99214 OFFICE O/P EST MOD 30 MIN: CPT | Performed by: FAMILY MEDICINE

## 2023-05-31 PROCEDURE — 82044 UR ALBUMIN SEMIQUANTITATIVE: CPT | Performed by: FAMILY MEDICINE

## 2023-05-31 RX ORDER — ALLOPURINOL 300 MG/1
300 TABLET ORAL DAILY
Qty: 90 TABLET | Refills: 3 | Status: SHIPPED | OUTPATIENT
Start: 2023-05-31 | End: 2024-09-04

## 2023-05-31 NOTE — LETTER
June 2, 2023      Abhay Taylor  [address] [address]      Dear Abhay,     I am writing to report that your included test results are as expected.     Good to go back on the Allopurinol!     Many labs contain some results that are slightly outside of the normal range, I have reviewed any of these results and they require no changes at this time.     Sean Madrid MD     Resulted Orders   Basic Metabolic Panel (8) ([facility])   Result Value Ref Range    Glucose 110 (H) 70 - 99 mg/dL    Urea Nitrogen 24 8 - 27 mg/dL    Creatinine 1.18 0.76 - 1.27 mg/dL    eGFR  64 >59 mL/min/1.73    BUN/Creatinine Ratio 20 10 - 24    Sodium 143 134 - 144 mmol/L    Potassium 4.9 3.5 - 5.2 mmol/L    Chloride 105 96 - 106 mmol/L    Total CO2 23 20 - 29 mmol/L    Calcium 9.5 8.6 - 10.2 mg/dL    Narrative    Performed at:  01 - [facility] [address], [place], [place]  [id number]  : Jake Miller MD, Phone:  [phone]   Microalbumin ([facility])   Result Value Ref Range    Albumin mg/L 80 (A) 0 - 10    Urine Creatinine Mg/Dl 200 0 - 300    A/C Ratio mg/g  (A)     Interpretation abnormal (A)    Uric Acid  Serum ([facility])   Result Value Ref Range    Uric Acid 8.8 (H) 3.8 - 8.4 mg/dL      Comment:                 Therapeutic target for gout patients: <6.0    Narrative    Performed at:  01 - [facility] [address], [place], [place]  [id number]  : Jake Miller MD, Phone:  [phone]   Hemoglobin A1C ([facility])   Result Value Ref Range    Hemoglobin A1C 6.8 (A) 4.0 - 6.0 %   Lipid Panel ([facility])   Result Value Ref Range    Cholesterol 132 100 - 199 mg/dL    Triglycerides 281 (H) 0 - 149 mg/dL    HDL Cholesterol 32 (L) >39 mg/dL    VLDL Cholesterol Sixto 44 (H) 5 - 40 mg/dL    LDL Cholesterol Calculated 56 0 - 99 mg/dL    LDL/HDL Ratio 1.8 0.0 - 3.6 ratio      Comment:                                          LDL/HDL Ratio                                              Men  Women                                 1/2 Avg.Risk  1.0    1.5                                    Avg.Risk  3.6    3.2                                 2X Avg.Risk  6.2    5.0                                 3X Avg.Risk  8.0    6.1      Narrative    Performed at:  01 - [facility] [address], [place], [place]  [id number]  : Jake Miller MD, Phone:  [phone]

## 2023-05-31 NOTE — PROGRESS NOTES
Problem(s) Oriented visit        SUBJECTIVE:                                                    Abhay Taylor is a 78 year old male who presents to clinic today for the following health issues :  Here to follow up   1. 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 Acute gout of right ankle, unspecified cause    - Uric Acid  Serum ([facility])    3. Idiopathic chronic gout of multiple sites without tophus  - allopurinol (ZYLOPRIM) 300 MG tablet; Take 1 tablet (300 mg) by mouth daily  Dispense: 90 tablet; Refill: 3       Problem list, Medication list, Allergies, and Medical/Social/Surgical histories reviewed in [facility] and updated as appropriate.   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)     Presbycusis of both ears     Peripheral polyneuropathy     Major depression, recurrent (H)     History of subdural hemorrhage     Traumatic brain injury with loss of consciousness, sequela (H)     Presence of Watchman left atrial appendage closure device     Seizure disorder (H)     Morbid obesity (H)     Atherosclerosis of aorta (H)     Past Surgical History:   Procedure Laterality Date     EP ABLATION / EP STUDIES       RELEASE CARPAL TUNNEL BILATERAL         Social History     Tobacco Use     Smoking status: Former     Smokeless tobacco: Never   Vaping Use     Vaping status: Not on file   Substance Use Topics     Alcohol use: Yes     Alcohol/week: 2.0 standard drinks of alcohol     Types: 2 Standard drinks or equivalent per week     Family History   Problem Relation Age of Onset     Colon Cancer Father            OBJECTIVE:                                                    /57   Pulse 60   Ht 1.778  "m (5' 10\")   Wt 114.4 kg (252 lb 4.8 oz)   SpO2 96%   BMI 36.20 kg/m    Body mass index is 36.2 kg/m .   APPEARANCE: = Relaxed and in no distress  Neck = No anterior or posterior adenopathy appreciated.  Resp effort = Calm regular breathing  Heart Rate, Rhythm, & sounds (no Murm)  = irregularly irregular rhythm  Digits and Nails = FROM in all finger joints, no nail dystrophy     ASSESSMENT/PLAN:                                                        Abhay was seen today for diarrhea, diabetes and recheck.    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15 0.66 - 1.25 mg/dL Final   06/27/2019 1.04 0.76 - 1.27 mg/dL Final   ]    GFR Estimate   Date Value Ref Range Status   02/13/2021 62 >60 mL/min/[1.73_m2] Final     Comment:     Non  GFR Calc  Starting 12/18/2018, serum creatinine based estimated GFR (eGFR) will be   calculated using the Chronic Kidney Disease Epidemiology Collaboration   (CKD-EPI) equation.                  No components found for: MICORALBUMINLC      GFR Estimate If Black   Date Value Ref Range Status   02/13/2021 72 >60 mL/min/[1.73_m2] Final     Comment:      GFR Calc  Starting 12/18/2018, serum creatinine based estimated GFR (eGFR) will be   calculated using the Chronic Kidney Disease Epidemiology Collaboration   (CKD-EPI) equation.       Medication (Note: This includes the hypertensive " combination subclass to make sure to show all ACEI/ARB's.)     ACE Inhibitors       ACE/ARB NOT PRESCRIBED, INTENTIONAL,    Please choose reason not prescribed, below     Patient not taking: Reported on 5/2/2023    Angiotensin II Receptor Antagonists       ACE/ARB NOT PRESCRIBED, INTENTIONAL,    Please choose reason not prescribed, below     Patient not taking: Reported on 5/2/2023                 Discussed importance in compliance in all areas of diabetic control including diet, routine BS checks, absolute medication compliance, laboratory monitoring, and attending regular follow up appointments as ordered.  Reviewed that failure to comply with instructions regarding diabetes will lead to a greater chance of poor diabetic control and therefore a greater chance of diabetes related complications such as CAD, CVA, PVD, and retinopathy/neuropathy/nephropathy.  We today managed prescriptions with refills ensured to ensure appropriate availability of current medications.  Next follow up will be in 4 months.    -     Lipid Profile ([facility])  -     Basic Metabolic Panel (8) ([facility])  -     MO FOOT EXAM NO CHARGE  -     Microalbumin ([facility])  -     Hemoglobin A1C ([facility])  -     VENOUS COLLECTION    Acute gout of right ankle, unspecified cause  Better now will go back on the Allopurinol    Idiopathic chronic gout of multiple sites without tophus  -     allopurinol (ZYLOPRIM) 300 MG tablet; Take 1 tablet (300 mg) by mouth daily        Work on weight loss  There are no Patient Instructions on file for this visit.    The following health maintenance items are reviewed in Epic and correct as of today:  Health Maintenance   Topic Date Due     ZOSTER IMMUNIZATION (1 of 2) Never done     LUNG CANCER SCREENING  Never done     MICROALBUMIN  02/18/2023     COVID-19 Vaccine (6 - Moderna series) 04/23/2023     FALL RISK ASSESSMENT  05/20/2023     BMP  05/23/2023     LIPID  05/23/2023     MEDICARE ANNUAL WELLNESS VISIT  05/20/2023     EYE EXAM   06/07/2023     PHQ-9  06/19/2023     A1C  11/30/2023     DIABETIC FOOT EXAM  05/31/2024     COLORECTAL CANCER SCREENING  02/24/2025     ADVANCE CARE PLANNING  05/20/2027     DTAP/TDAP/TD IMMUNIZATION (2 - Td or Tdap) 12/22/2032     HEPATITIS C SCREENING  Completed     DEPRESSION ACTION PLAN  Completed     INFLUENZA VACCINE  Completed     Pneumococcal Vaccine: 65+ Years  Completed     IPV IMMUNIZATION  Aged Out     MENINGITIS IMMUNIZATION  Aged Out       Sean Madrid MD  [facility]  Family Practice  [facility]  [phone]    For any issues my office # is [phone]

## 2023-06-01 LAB
BUN SERPL-MCNC: 24 MG/DL (ref 8–27)
BUN/CREATININE RATIO: 20 (ref 10–24)
CALCIUM SERPL-MCNC: 9.5 MG/DL (ref 8.6–10.2)
CHLORIDE SERPLBLD-SCNC: 105 MMOL/L (ref 96–106)
CHOLEST SERPL-MCNC: 132 MG/DL (ref 100–199)
CREAT SERPL-MCNC: 1.18 MG/DL (ref 0.76–1.27)
EGFR: 64 ML/MIN/1.73
GLUCOSE SERPL-MCNC: 110 MG/DL (ref 70–99)
HDLC SERPL-MCNC: 32 MG/DL
LDL/HDL RATIO: 1.8 RATIO (ref 0–3.6)
LDLC SERPL CALC-MCNC: 56 MG/DL (ref 0–99)
POTASSIUM SERPL-SCNC: 4.9 MMOL/L (ref 3.5–5.2)
SODIUM SERPL-SCNC: 143 MMOL/L (ref 134–144)
TOTAL CO2: 23 MMOL/L (ref 20–29)
TRIGL SERPL-MCNC: 281 MG/DL (ref 0–149)
URATE SERPL-MCNC: 8.8 MG/DL (ref 3.8–8.4)
VLDLC SERPL CALC-MCNC: 44 MG/DL (ref 5–40)

## 2023-06-01 NOTE — RESULT ENCOUNTER NOTE
Dear Abhay,     I am writing to report that your included test results are as expected.    Good to go back on the Allopurinol!    Many labs contain some results that are slightly outside of the normal range, I have reviewed any of these results and they require no changes at this time.    Sean Madrid MD

## 2023-06-23 ENCOUNTER — TRANSFERRED RECORDS (OUTPATIENT)
Dept: FAMILY MEDICINE | Facility: CLINIC | Age: 78
End: 2023-06-23

## 2023-06-28 ENCOUNTER — MEDICAL CORRESPONDENCE (OUTPATIENT)
Dept: FAMILY MEDICINE | Facility: CLINIC | Age: 78
End: 2023-06-28

## 2023-06-30 DIAGNOSIS — E11.49 DM TYPE 2 CAUSING NEUROLOGICAL DISEASE (H): ICD-10-CM

## 2023-06-30 RX ORDER — METFORMIN HCL 500 MG
TABLET, EXTENDED RELEASE 24 HR ORAL
Qty: 90 TABLET | Refills: 1 | Status: SHIPPED | OUTPATIENT
Start: 2023-06-30 | End: 2024-01-17 | Stop reason: SINTOL

## 2023-06-30 NOTE — TELEPHONE ENCOUNTER
Metformin   LOV 5/31/23  Follow up in 4 months   BP Readings from Last 3 Encounters:   05/31/23 100/57   05/02/23 126/72   03/27/23 115/79     Hemoglobin A1C   Date Value Ref Range Status   05/31/2023 6.8 (A) 4.0 - 6.0 % Final

## 2023-09-28 DIAGNOSIS — F32.1 CURRENT MODERATE EPISODE OF MAJOR DEPRESSIVE DISORDER WITHOUT PRIOR EPISODE (H): ICD-10-CM

## 2023-10-11 DIAGNOSIS — Z76.0 ENCOUNTER FOR MEDICATION REFILL: ICD-10-CM

## 2023-10-11 RX ORDER — ATORVASTATIN CALCIUM 20 MG/1
TABLET, FILM COATED ORAL
Qty: 90 TABLET | Refills: 0 | Status: SHIPPED | OUTPATIENT
Start: 2023-10-11 | End: 2024-05-05

## 2023-12-19 ENCOUNTER — OFFICE VISIT (OUTPATIENT)
Dept: FAMILY MEDICINE | Facility: CLINIC | Age: 78
End: 2023-12-19

## 2023-12-19 VITALS
OXYGEN SATURATION: 97 % | WEIGHT: 251.8 LBS | HEART RATE: 59 BPM | SYSTOLIC BLOOD PRESSURE: 143 MMHG | BODY MASS INDEX: 36.13 KG/M2 | DIASTOLIC BLOOD PRESSURE: 67 MMHG

## 2023-12-19 DIAGNOSIS — F33.41 RECURRENT MAJOR DEPRESSION IN PARTIAL REMISSION (H): ICD-10-CM

## 2023-12-19 DIAGNOSIS — Z00.00 ENCOUNTER FOR MEDICARE ANNUAL WELLNESS EXAM: ICD-10-CM

## 2023-12-19 DIAGNOSIS — E11.49 DM TYPE 2 CAUSING NEUROLOGICAL DISEASE (H): Primary | ICD-10-CM

## 2023-12-19 PROBLEM — F32.1 CURRENT MODERATE EPISODE OF MAJOR DEPRESSIVE DISORDER WITHOUT PRIOR EPISODE (H): Status: ACTIVE | Noted: 2023-12-19

## 2023-12-19 LAB — HBA1C MFR BLD: 6.4 % (ref 4–6)

## 2023-12-19 PROCEDURE — 99213 OFFICE O/P EST LOW 20 MIN: CPT | Mod: 25 | Performed by: FAMILY MEDICINE

## 2023-12-19 PROCEDURE — G0439 PPPS, SUBSEQ VISIT: HCPCS | Performed by: FAMILY MEDICINE

## 2023-12-19 PROCEDURE — 36415 COLL VENOUS BLD VENIPUNCTURE: CPT | Performed by: FAMILY MEDICINE

## 2023-12-19 PROCEDURE — 83036 HEMOGLOBIN GLYCOSYLATED A1C: CPT | Performed by: FAMILY MEDICINE

## 2023-12-19 ASSESSMENT — PATIENT HEALTH QUESTIONNAIRE - PHQ9: SUM OF ALL RESPONSES TO PHQ QUESTIONS 1-9: 4

## 2023-12-19 NOTE — NURSING NOTE
Unable to capture pt eyes for DM eye exam, right eye- dirty lens ( pts eyes)  Left eye- no image 5 times   Faith Pichardo CMA. 12/19/2023

## 2023-12-19 NOTE — PATIENT INSTRUCTIONS
Patient Education   Personalized Prevention Plan  You are due for the preventive services outlined below.  Your care team is available to assist you in scheduling these services.  If you have already completed any of these items, please share that information with your care team to update in your medical record.  Health Maintenance Due   Topic Date Due     Zoster (Shingles) Vaccine (1 of 2) Never done     LUNG CANCER SCREENING  Never done     RSV VACCINE (Pregnancy & 60+) (1 - 1-dose 60+ series) Never done     FALL RISK ASSESSMENT  05/20/2023     Eye Exam  06/07/2023     Depression Assessment  06/19/2023     DEPRESSION 12 MO INDEX REPEAT PHQ-9  10/20/2023     A1C Lab  11/30/2023

## 2023-12-19 NOTE — PROGRESS NOTES
"SUBJECTIVE:   Patient today who presents for Preventive Visit.       Patient has been advised of split billing requirements and indicates understanding: Yes  Are you in the first 12 months of your Medicare Part B coverage?  No    Physical Health:    In general, how would you rate your overall physical health? fair    Outside of work, how many days during the week do you exercise? none    Outside of work, approximately how many minutes a day do you exercise?not applicable    If you drink alcohol do you typically have >3 drinks per day or >7 drinks per week? No    Do you usually eat at least 4 servings of fruit and vegetables a day, include whole grains & fiber and avoid regularly eating high fat or \"junk\" foods? NO    Do you have any problems taking medications regularly?  No    Do you have any side effects from medications? none    Needs assistance for the following daily activities: no assistance needed    Which of the following safety concerns are present in your home?  none identified     Hearing impairment: Yes, Patient wears hearing aids, screening not reccommended     In the past 6 months, have you been bothered by leaking of urine? no  Mental Health:    In general, how would you rate your overall mental or emotional health? fair    Do you feel safe in your environment? Yes    Have you ever done Advance Care Planning? (For example, a Health Directive, POLST, or a discussion with a medical provider or your loved ones about your wishes): Yes, patient states has an Advance Care Planning document and will bring a copy to the clinic.    Do you have sleep apnea, excessive snoring or daytime drowsiness? : no  Healthy Habits:    Have you had an eye exam in the past two years? yes     Do you see a dentist twice per year? yes     What is your current smoking status? no    Do you use smokeless tobacco? no    Abuse: Current or Past(Physical, Sexual or Emotional)- NA       PHQ-2 Score:    Fall risk:  Fallen 2 or more times " in the past year?: No  Any fall with injury in the past year?: No    Cognitive Screenin) Repeat 3 items (Leader, Season, Table)    2) Clock draw: NORMAL  3) 3 item recall: Recalls 1 object   Results: NORMAL clock, 1-2 items recalled: COGNITIVE IMPAIRMENT LESS LIKELY    Mini-CogTM Copyright MATTEO Hollis. Licensed by the author for use in [facility]; reprinted with permission (avril@[facility]). All rights reserved.      Additional concerns to address?  No    PROBLEMS TO ADD ON...  Also to follow up on his well controlled DM ]  Depression:  Has known history of depression.  Has been on medication for this.  The patient does not report any significant side effects of this medication.  The prior symptoms leading to the original diagnosis and decision to start medication therapy are not yet optimal.     Appetite is stable.  Sleeping patterns are stable.  No reported thoughts of suicide or homicide.  Last PHQ-9 score on record=       2021     4:59 PM 3/22/2021    10:32 AM 10/1/2021    12:08 PM 2022     9:05 AM 2022    11:19 AM 2023    10:47 AM   PHQ-9 SCORE   PHQ-9 Total Score 21 7 12 9 10 4         Reviewed and updated as needed this visit by clinical staff    Allergies               Reviewed and updated as needed this visit by Provider                 Social History     Tobacco Use     Smoking status: Former     Years: 10     Types: Cigarettes     Quit date:      Years since quitting: 10.9     Smokeless tobacco: Never     Tobacco comments:     10 years ago quit (23)    Substance Use Topics     Alcohol use: Yes     Alcohol/week: 2.0 standard drinks of alcohol     Types: 2 Standard drinks or equivalent per week              No data to display              Do you have a current opioid prescription? No  Do you use any other controlled substances or medications that are not prescribed by a provider? None                      Current providers sharing in care for this patient  "include:   Patient Care Team:  Sean Madrid MD as PCP - General (Family Practice)  Sean Madrid MD as Assigned PCP    The following health maintenance items are reviewed in Epic and correct as of today:  Health Maintenance   Topic Date Due     ZOSTER IMMUNIZATION (1 of 2) Never done     LUNG CANCER SCREENING  Never done     RSV VACCINE (Pregnancy & 60+) (1 - 1-dose 60+ series) Never done     EYE EXAM  06/07/2023     DEPRESSION 12 MO INDEX REPEAT PHQ-9  01/02/2024     BMP  05/31/2024     LIPID  05/31/2024     MICROALBUMIN  05/31/2024     DIABETIC FOOT EXAM  05/31/2024     A1C  06/19/2024     PHQ-9  06/19/2024     MEDICARE ANNUAL WELLNESS VISIT  12/19/2024     FALL RISK ASSESSMENT  12/19/2024     COLORECTAL CANCER SCREENING  02/24/2025     ADVANCE CARE PLANNING  12/19/2028     DTAP/TDAP/TD IMMUNIZATION (2 - Td or Tdap) 12/22/2032     HEPATITIS C SCREENING  Completed     DEPRESSION ACTION PLAN  Completed     INFLUENZA VACCINE  Completed     Pneumococcal Vaccine: 65+ Years  Completed     COVID-19 Vaccine  Completed     IPV IMMUNIZATION  Aged Out     HPV IMMUNIZATION  Aged Out     MENINGITIS IMMUNIZATION  Aged Out     RSV MONOCLONAL ANTIBODY  Aged Out     Lab work is in process      ROS:  Constitutional, HEENT, cardiovascular, pulmonary, GI, , musculoskeletal, neuro, skin, endocrine and psych systems are negative, except as otherwise noted.    OBJECTIVE:   BP (!) 143/67   Pulse 59   Wt 114.2 kg (251 lb 12.8 oz)   SpO2 97%   BMI 36.13 kg/m     Estimated body mass index is 36.13 kg/m  as calculated from the following:    Height as of 5/31/23: 1.778 m (5' 10\").    Weight as of this encounter: 114.2 kg (251 lb 12.8 oz).  EXAM:   GENERAL: healthy, alert and no distress  NECK: no adenopathy, no asymmetry, masses, or scars and thyroid normal to palpation  RESP: lungs clear to auscultation - no rales, rhonchi or wheezes  CV: regular rate and rhythm, normal S1 S2, no S3 or S4, no murmur, click or rub, no " "peripheral edema and peripheral pulses strong  ABDOMEN: soft, nontender, no hepatosplenomegaly, no masses and bowel sounds normal  MS: no gross musculoskeletal defects noted, no edema  NEURO: Normal strength and tone, sensory exam grossly normal, mentation intact, gait abnormal:due to vision loss and unsteadyness  Diabetic foot exam: normal DP and PT pulses, no trophic changes or ulcerative lesions and reduced sensation at on the balls of the feet;  Diagnostic Test Results:  Labs reviewed in Epic    ASSESSMENT / PLAN:   Abhay was seen today for physical, recheck and health maintenance.    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)  As a direct cause of thenone and neurologiir history of diabetes they have the following Diabetic complications:   Most  recent A1C:     The last available A1C values from [facility] [place], Lab [facility]:  No components found for: \"[id number]\"     Lab Results   Component Value Date    A1C 6.4 12/19/2023    A1C 6.8 05/31/2023    A1C 6.3 12/19/2022    History   Smoking Status     Former     Years: 10.00     Types: Cigarettes     Quit date: 2013   Smokeless Tobacco     Never    @     Kidney studies:  Creatinine   Date Value Ref Range Status   05/31/2023 1.18 0.76 - 1.27 mg/dL Final   05/23/2022 1.11 0.76 - 1.27 mg/dL Final   02/13/2021 1.15 0.66 - 1.25 mg/dL Final   ]    GFR Estimate   Date Value Ref Range Status   02/13/2021 62 >60 mL/min/[1.73_m2] Final     Comment:     Non  GFR Calc  Starting 12/18/2018, serum creatinine based estimated GFR (eGFR) will be   calculated using the Chronic Kidney Disease Epidemiology Collaboration   (CKD-EPI) equation.                  No components found for: \"MICORALBUMINLC\"      GFR Estimate If Black   Date Value Ref Range Status   02/13/2021 72 >60 mL/min/[1.73_m2] Final     Comment:      GFR Calc  Starting 12/18/2018, serum creatinine based estimated GFR (eGFR) will be "   calculated using the Chronic Kidney Disease Epidemiology Collaboration   (CKD-EPI) equation.       Medication (Note: This includes the hypertensive combination subclass to make sure to show all ACEI/ARB's.)     ACE Inhibitors       ACE/ARB NOT PRESCRIBED, INTENTIONAL,    Please choose reason not prescribed, below    Angiotensin II Receptor Antagonists       ACE/ARB NOT PRESCRIBED, INTENTIONAL,    Please choose reason not prescribed, below                 Discussed importance in compliance in all areas of diabetic control including diet, routine BS checks, absolute medication compliance, laboratory monitoring, and attending regular follow up appointments as ordered.  Reviewed that failure to comply with instructions regarding diabetes will lead to a greater chance of poor diabetic control and therefore a greater chance of diabetes related complications such as CAD, CVA, PVD, and retinopathy/neuropathy/nephropathy.  We today managed prescriptions with refills ensured to ensure appropriate availability of current medications.  Next follow up will be in 6 months.    -     VENOUS COLLECTION  -     Hemoglobin A1C ([facility])    Encounter for Medicare annual wellness exam    Current moderate episode of major depressive disorder without prior episode (H)  Spoke at length about mood disorders including suspected pathophysiology, role of neurotransmitters, and treatment options including medication options ( especially SSRIs that treat cause of sx) and counselling.  Tolerating current meds. We discussed ongoing management of his  medications and how we will refill the medications now and in the future.    -     sertraline (ZOLOFT) 50 MG tablet; Take 1 tablet (50 mg) by mouth daily        Patient has been advised of split billing requirements and indicates understanding: Yes    COUNSELING:  Reviewed preventive health counseling, as reflected in patient instructions       Regular exercise       Healthy diet/nutrition       Bladder  "control       Fall risk prevention    Estimated body mass index is 36.13 kg/m  as calculated from the following:    Height as of 5/31/23: 1.778 m (5' 10\").    Weight as of this encounter: 114.2 kg (251 lb 12.8 oz).    Weight management plan: Discussed healthy diet and exercise guidelines    He reports that he quit smoking about 10 years ago. His smoking use included cigarettes. He has never used smokeless tobacco.      I have reviewed Opioid Use Disorder and Substance Use Disorder risk factors and made any needed referrals.     Appropriate preventive services were discussed with this patient, including applicable screening as appropriate for cardiovascular disease, diabetes, osteopenia/osteoporosis, and glaucoma.  As appropriate for age/gender, discussed screening for colorectal cancer, prostate cancer, breast cancer, and cervical cancer. Checklist reviewing preventive services available has been given to the patient.    Reviewed patients plan of care and provided an AVS. The Basic Care Plan (routine screening as documented in Health Maintenance) for Jef meets the Care Plan requirement. This Care Plan has been established and reviewed with the Patient.    Counseling Resources:  ATP IV Guidelines  Pooled Cohorts Equation Calculator  Breast Cancer Risk Calculator  BRCA-Related Cancer Risk Assessment: FHS-7 Tool  FRAX Risk Assessment  ICSI Preventive Guidelines  Dietary Guidelines for Americans, 2010  USDA's MyPlate  ASA Prophylaxis  Lung CA Screening    Sean Madrid MD  [facility]  "

## 2023-12-28 ENCOUNTER — MEDICAL CORRESPONDENCE (OUTPATIENT)
Dept: FAMILY MEDICINE | Facility: CLINIC | Age: 78
End: 2023-12-28

## 2024-01-16 ENCOUNTER — OFFICE VISIT (OUTPATIENT)
Dept: URGENT CARE | Facility: URGENT CARE | Age: 79
End: 2024-01-16
Payer: COMMERCIAL

## 2024-01-16 VITALS
OXYGEN SATURATION: 96 % | HEART RATE: 84 BPM | BODY MASS INDEX: 35.58 KG/M2 | RESPIRATION RATE: 18 BRPM | DIASTOLIC BLOOD PRESSURE: 88 MMHG | WEIGHT: 248 LBS | SYSTOLIC BLOOD PRESSURE: 125 MMHG | TEMPERATURE: 97.3 F

## 2024-01-16 DIAGNOSIS — S09.90XA HEAD INJURY, INITIAL ENCOUNTER: Primary | ICD-10-CM

## 2024-01-16 DIAGNOSIS — G44.309 POST-TRAUMATIC HEADACHE, NOT INTRACTABLE, UNSPECIFIED CHRONICITY PATTERN: ICD-10-CM

## 2024-01-16 DIAGNOSIS — S06.0X0A CONCUSSION WITHOUT LOSS OF CONSCIOUSNESS, INITIAL ENCOUNTER: ICD-10-CM

## 2024-01-16 PROCEDURE — 99213 OFFICE O/P EST LOW 20 MIN: CPT | Performed by: FAMILY MEDICINE

## 2024-01-17 ENCOUNTER — OFFICE VISIT (OUTPATIENT)
Dept: FAMILY MEDICINE | Facility: CLINIC | Age: 79
End: 2024-01-17

## 2024-01-17 VITALS
HEART RATE: 89 BPM | SYSTOLIC BLOOD PRESSURE: 114 MMHG | DIASTOLIC BLOOD PRESSURE: 67 MMHG | BODY MASS INDEX: 35.56 KG/M2 | WEIGHT: 247.8 LBS | OXYGEN SATURATION: 97 %

## 2024-01-17 DIAGNOSIS — E66.01 MORBID OBESITY (H): ICD-10-CM

## 2024-01-17 DIAGNOSIS — E11.42 DIABETIC POLYNEUROPATHY ASSOCIATED WITH TYPE 2 DIABETES MELLITUS (H): ICD-10-CM

## 2024-01-17 DIAGNOSIS — D68.69 SECONDARY HYPERCOAGULABLE STATE (H): ICD-10-CM

## 2024-01-17 DIAGNOSIS — F32.1 CURRENT MODERATE EPISODE OF MAJOR DEPRESSIVE DISORDER WITHOUT PRIOR EPISODE (H): ICD-10-CM

## 2024-01-17 DIAGNOSIS — I70.0 ATHEROSCLEROSIS OF AORTA (H): ICD-10-CM

## 2024-01-17 DIAGNOSIS — I48.20 CHRONIC ATRIAL FIBRILLATION (H): ICD-10-CM

## 2024-01-17 DIAGNOSIS — E11.21 DIABETIC NEPHROPATHY ASSOCIATED WITH TYPE 2 DIABETES MELLITUS (H): ICD-10-CM

## 2024-01-17 DIAGNOSIS — S06.9X9S TRAUMATIC BRAIN INJURY WITH LOSS OF CONSCIOUSNESS, SEQUELA (H): ICD-10-CM

## 2024-01-17 DIAGNOSIS — F33.1 MAJOR DEPRESSIVE DISORDER, RECURRENT EPISODE, MODERATE (H): Primary | ICD-10-CM

## 2024-01-17 DIAGNOSIS — G40.909 SEIZURE DISORDER (H): ICD-10-CM

## 2024-01-17 PROCEDURE — 99214 OFFICE O/P EST MOD 30 MIN: CPT | Performed by: FAMILY MEDICINE

## 2024-01-17 RX ORDER — METOPROLOL TARTRATE 75 MG/1
75 TABLET, FILM COATED ORAL 2 TIMES DAILY
Qty: 180 TABLET | Refills: 3 | Status: SHIPPED | OUTPATIENT
Start: 2024-01-17

## 2024-01-17 RX ORDER — COLCHICINE 0.6 MG/1
TABLET ORAL
COMMUNITY

## 2024-01-17 RX ORDER — GABAPENTIN 600 MG/1
TABLET ORAL
Start: 2024-01-17 | End: 2024-07-22
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
## 2024-01-17 NOTE — PROGRESS NOTES
Problem(s) Oriented visit        SUBJECTIVE:                                                    Abhay Taylor is a 78 year old male who presents to clinic today for the following health issues :  Consult (Discussing [facility] )      1. Diabetic polyneuropathy associated with type 2 diabetes mellitus (H)  On gabapentin and helps a bit.  Some diarrhea and is on metformin 500 dailiy      Problem list, Medication list, Allergies, and Medical/Social/Surgical histories reviewed in EPIC and updated as appropriate.   Additional history: as documented    ROS:  General and Resp. 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)    Presbycusis of both ears    Peripheral polyneuropathy    Major depression, recurrent (H24)    History of subdural hemorrhage    Traumatic brain injury with loss of consciousness, sequela (H24)    Presence of Watchman left atrial appendage closure device    Seizure disorder (H)    Morbid obesity (H)    Atherosclerosis of aorta (H24)    Current moderate episode of major depressive disorder without prior episode (H)    Secondary hypercoagulable state (H24)     Past Surgical History:   Procedure Laterality Date    EP ABLATION / EP STUDIES      RELEASE CARPAL TUNNEL BILATERAL         Social History     Tobacco Use    Smoking status: Former     Years: 10     Types: Cigarettes     Quit date:      Years since quittin.0    Smokeless tobacco: Never    Tobacco comments:     10 years ago quit (23)    Substance Use Topics    Alcohol use: Yes     Alcohol/week: 2.0 standard drinks of alcohol     Types: 2 Standard drinks or equivalent per week     Family History   Problem Relation Age of Onset    Colon Cancer Father            OBJECTIVE:                                                     /67   Pulse 89   Wt 112.4 kg (247 lb 12.8 oz)   SpO2 97%   BMI 35.56 kg/m    Body mass index is 35.56 kg/m .   APPEARANCE: = Relaxed and in no distress  Ear canals and TM's = Canals are not inflammed and have none or little wax and the drums are not injected and have a light reflex   Oropharynx = No leukoplakia, No injection to the tissues, Normal Uvula  Thyroid = Not enlarged and no masses felt  Resp effort = Calm regular breathing     ASSESSMENT/PLAN:                                                        Abhay was seen today for consult.    Diagnoses and all orders for this visit:    Major depressive disorder, recurrent episode, moderate (H)  Depression:  Has known history of depression.  Has been on medication for this.  The patient does not report any significant side effects of this medication.  The prior symptoms leading to the original diagnosis and decision to start medication therapy are better.     Appetite is stable.  Sleeping patterns are stable.  No reported thoughts of suicide or homicide.    Last 3 PHQ9 results:      10/1/2021    12:08 PM 5/20/2022     9:05 AM 12/19/2022    11:19 AM 12/19/2023    10:47 AM   PHQ-9 SCORE   PHQ-9 Total Score 12 9 10 4       Diabetic polyneuropathy associated with type 2 diabetes mellitus (H)  Neuropathy is not bad recently will decrease morning dose to 150  -     gabapentin (NEURONTIN) 600 MG tablet; 150 mg in the am and 300 mg in the pm  Diabetic nephropathy with elevated micro  On arb    Chronic atrial fibrillation (H)and Secondary hypercoagulable state (H24)  Has watchman to prevent clots.    Morbid obesity (H)  With Dm, BMI is now at 35.5    Atherosclerosis of aorta (H24)  On statin and ASA    Traumatic brain injury with loss of consciousness, sequela (H24)  Here with wife, no longer driving    Seizure disorder (H)  No recent seizures, continue current meds.    Other orders  -     metoprolol tartrate 75 MG TABS;  Take 75 mg by mouth 2 times daily    Hold the metformin and recheck in 4 months.    Regular exercise    The following health maintenance items are reviewed in Epic and correct as of today:  Health Maintenance   Topic Date Due    ZOSTER IMMUNIZATION (1 of 2) Never done    RSV VACCINE (Pregnancy & 60+) (1 - 1-dose 60+ series) Never done    EYE EXAM  06/07/2023    BMP  05/31/2024    LIPID  05/31/2024    MICROALBUMIN  05/31/2024    A1C  06/19/2024    PHQ-9  06/19/2024    MEDICARE ANNUAL WELLNESS VISIT  12/19/2024    DIABETIC FOOT EXAM  12/19/2024    FALL RISK ASSESSMENT  12/19/2024    COLORECTAL CANCER SCREENING  02/24/2025    ADVANCE CARE PLANNING  12/19/2028    DTAP/TDAP/TD IMMUNIZATION (2 - Td or Tdap) 12/22/2032    HEPATITIS C SCREENING  Completed    DEPRESSION ACTION PLAN  Completed    INFLUENZA VACCINE  Completed    Pneumococcal Vaccine: 65+ Years  Completed    COVID-19 Vaccine  Completed    IPV IMMUNIZATION  Aged Out    HPV IMMUNIZATION  Aged Out    MENINGITIS IMMUNIZATION  Aged Out    RSV MONOCLONAL ANTIBODY  Aged Out    LUNG CANCER SCREENING  Discontinued       Sean Madrid MD  [facility]  Family Practice  [facility]  [phone]    For any issues my office # is [phone]

## 2024-05-05 DIAGNOSIS — Z76.0 ENCOUNTER FOR MEDICATION REFILL: ICD-10-CM

## 2024-05-05 RX ORDER — ATORVASTATIN CALCIUM 20 MG/1
TABLET, FILM COATED ORAL
Qty: 90 TABLET | Refills: 0 | Status: SHIPPED | OUTPATIENT
Start: 2024-05-05 | End: 2024-08-11

## 2024-05-20 ENCOUNTER — OFFICE VISIT (OUTPATIENT)
Dept: FAMILY MEDICINE | Facility: CLINIC | Age: 79
End: 2024-05-20

## 2024-05-20 VITALS
DIASTOLIC BLOOD PRESSURE: 72 MMHG | HEIGHT: 71 IN | BODY MASS INDEX: 35.25 KG/M2 | OXYGEN SATURATION: 94 % | SYSTOLIC BLOOD PRESSURE: 119 MMHG | WEIGHT: 251.8 LBS | HEART RATE: 89 BPM

## 2024-05-20 DIAGNOSIS — J06.9 UPPER RESPIRATORY TRACT INFECTION, UNSPECIFIED TYPE: ICD-10-CM

## 2024-05-20 DIAGNOSIS — U07.1 INFECTION DUE TO 2019 NOVEL CORONAVIRUS: Primary | ICD-10-CM

## 2024-05-20 DIAGNOSIS — M10.071 ACUTE IDIOPATHIC GOUT OF RIGHT FOOT: ICD-10-CM

## 2024-05-20 LAB
INFLUENZA A (RMG): NEGATIVE
INFLUENZA B (RMG): NEGATIVE
SARS ANTIGEN (RMG): POSITIVE

## 2024-05-20 PROCEDURE — 99214 OFFICE O/P EST MOD 30 MIN: CPT

## 2024-05-20 PROCEDURE — G2211 COMPLEX E/M VISIT ADD ON: HCPCS

## 2024-05-20 PROCEDURE — 87428 SARSCOV & INF VIR A&B AG IA: CPT

## 2024-05-20 ASSESSMENT — PATIENT HEALTH QUESTIONNAIRE - PHQ9: SUM OF ALL RESPONSES TO PHQ QUESTIONS 1-9: 10

## 2024-05-20 NOTE — PROGRESS NOTES
Assessment & Plan     Infection due to 2019 novel coronavirus  Discussed diagnosis, pathophysiology, symptomatic cares and potential harms and benefits of antivirals.  We discussed what is know and what is not known regarding these medication treatments as they have emergency use authorization.  After a thorough discussion he has elected to proceed with treatment.  No history of kidney or liver problems. Due to medication interactions, Rx for Molnupirarvir. Patient is aware that this medication may be less effective. Side effects reviewed including diarrhea, dizziness and nausea. Patient is agreement with the assessment and plan and verbalizes understanding.  All questions answered.  Red flag symptoms that should prompt emergent evaluation discussed and understood.   - molnupiravir (LAGEVRIO) 200 MG capsule  Dispense: 40 each; Refill: 0    URI (upper respiratory infection), unspecified type  - Influenza + SARS Antigen ([facility])    Acute idiopathic gout of right foot  Patient has history of gout. Reviewed gout in detail including common triggers. Discussed treatment options including NSAIDs, colchicine and prednisone as well as non-pharmacologic interventions and symptomatic cares.  Patient currently started taking his Colchicine. Recommend continuing with his current plan. Proper use and potential side effects discussed in detail. Low purine diet discussed. Follow up reviewed. Red flags that warrant emergent evaluation discussed. Patient agreeable to plan. All questions answered.               Work on weight loss  Regular exercise  See Patient Instructions    Return if symptoms worsen or fail to improve, for Follow up.    Fred Blank is a 79 year old, presenting for the following health issues:  URI (101+ temp for 2 days, headache, sneezing, nasal drip, congestion/Has not tested for flu or covid ), Musculoskeletal Problem (Has possible gout on Right Foot which pt has been taking medication for per pt ), and  "OTHER (Wife wants to know if [employer] has sent us any of pt's test results done /High kappa lambda ratio that he is getting checked out this Wednesday and is worried about current sickness )    HPI     Concern - URI  Onset: Yesterday morning  Description: Fever, headache, congestion, runny nose, sneezing, rattling cough, joint pains  Intensity: 7-8/10  Progression of Symptoms:  worsening  Accompanying Signs & Symptoms: Fever, headache, congestion, runny nose, sneezing, rattling cough  Previous history of similar problem: none  Precipitating factors:        Worsened by: blowing his nose  Alleviating factors:        Improved by: none  Therapies tried and outcome: Tylenol and diamatap   No known sick contacts.     Concern - Right foot  Onset: jammed right big toe on turf 3 weeks ago. Puffy for weeks  Description: concerned for gout, tough time walking with it  Intensity: 4/10  Progression of Symptoms:  same  Accompanying Signs & Symptoms: Swelling at base of right great toe  Previous history of similar problem: Hx of gout  Precipitating factors:        Worsened by: walking  Alleviating factors:        Improved by: Colchicine just took it this morning  Therapies tried and outcome: Just took colchicine     Follow up on Wednesday related to elevates [place] free light chain and Kappa lambda ratio.       Review of Systems  Constitutional, HEENT, cardiovascular, pulmonary, GI, , musculoskeletal, neuro, skin, endocrine and psych systems are negative, except as otherwise noted.      Objective    /72   Pulse 89   Ht 1.803 m (5' 11\")   Wt 114.2 kg (251 lb 12.8 oz)   SpO2 94%   BMI 35.12 kg/m    Body mass index is 35.12 kg/m .  Physical Exam   GENERAL: alert, appears ills and no acute distress.  HENT: ear canals and TM's normal, nose and mouth without ulcers or lesions  NECK: no adenopathy  RESP: lungs clear to auscultation - no rales, rhonchi or wheezes  CV: regular rate and rhythm, normal S1 S2, no S3 or S4, no " murmur, click or rub, no peripheral edema  MS: right great toe with mild erythema, tenderness, swelling and warmth.   PSYCH: mentation appears normal, affect normal/bright    Results for orders placed or performed in visit on 05/20/24 (from the past 24 hour(s))   Influenza + SARS Antigen ([facility])   Result Value Ref Range    INFLUENZA A ([facility]) Negative Negative    INFLUENZA B ([facility]) Negative Negative    SARS ANTIGEN ([facility]) Positive (A) Negative           Signed Electronically by: KAJAL Dotson CNP

## 2024-06-24 DIAGNOSIS — E11.49 DM TYPE 2 CAUSING NEUROLOGICAL DISEASE (H): ICD-10-CM

## 2024-06-24 RX ORDER — METFORMIN HCL 500 MG
TABLET, EXTENDED RELEASE 24 HR ORAL
Qty: 90 TABLET | Refills: 1 | Status: SHIPPED | OUTPATIENT
Start: 2024-06-24 | End: 2024-06-28

## 2024-06-27 DIAGNOSIS — G89.29 CHRONIC PAIN OF LEFT KNEE: ICD-10-CM

## 2024-06-27 DIAGNOSIS — M25.562 CHRONIC PAIN OF LEFT KNEE: ICD-10-CM

## 2024-06-27 NOTE — TELEPHONE ENCOUNTER
Med: acetaminophen (TYLENOL) 500 MG tablet     LOV (related): 5/20/24      Due for F/U around: June 2024 for DM    Next Appt: none  Patient's wife informed to set up appt in near future  Questions on Gabapentin and taking over the counter meds instead- recommended asking provider

## 2024-06-28 ENCOUNTER — OFFICE VISIT (OUTPATIENT)
Dept: FAMILY MEDICINE | Facility: CLINIC | Age: 79
End: 2024-06-28

## 2024-06-28 VITALS
BODY MASS INDEX: 34.84 KG/M2 | OXYGEN SATURATION: 94 % | HEART RATE: 72 BPM | DIASTOLIC BLOOD PRESSURE: 73 MMHG | SYSTOLIC BLOOD PRESSURE: 117 MMHG | WEIGHT: 249.8 LBS

## 2024-06-28 DIAGNOSIS — I43 CARDIAC AMYLOIDOSIS (H): ICD-10-CM

## 2024-06-28 DIAGNOSIS — E11.49 DM TYPE 2 CAUSING NEUROLOGICAL DISEASE (H): Primary | ICD-10-CM

## 2024-06-28 DIAGNOSIS — E85.4 CARDIAC AMYLOIDOSIS (H): ICD-10-CM

## 2024-06-28 DIAGNOSIS — R29.818 NEUROLOGIC DEFICIT DUE TO OLD ISCHEMIC STROKE: ICD-10-CM

## 2024-06-28 DIAGNOSIS — I69.398 NEUROLOGIC DEFICIT DUE TO OLD ISCHEMIC STROKE: ICD-10-CM

## 2024-06-28 DIAGNOSIS — F33.1 MODERATE EPISODE OF RECURRENT MAJOR DEPRESSIVE DISORDER (H): ICD-10-CM

## 2024-06-28 PROBLEM — G31.84 MILD COGNITIVE IMPAIRMENT: Status: ACTIVE | Noted: 2024-06-28

## 2024-06-28 LAB
A/C RATIO (RMG): <30
ALBUMIN- RMG: 80 (ref 0–10)
ANION GAP SERPL CALCULATED.3IONS-SCNC: 8 MMOL/L (ref 7–15)
BUN SERPL-MCNC: 22.6 MG/DL (ref 8–23)
CALCIUM SERPL-MCNC: 9.1 MG/DL (ref 8.8–10.2)
CHLORIDE SERPL-SCNC: 107 MMOL/L (ref 98–107)
CHOLESTEROL: 137 MG/DL (ref 100–199)
CREAT SERPL-MCNC: 1.11 MG/DL (ref 0.67–1.17)
DEPRECATED HCO3 PLAS-SCNC: 29 MMOL/L (ref 22–29)
EGFRCR SERPLBLD CKD-EPI 2021: 68 ML/MIN/1.73M2
FASTING STATUS PATIENT QL REPORTED: NO
FASTING?: NO
GLUCOSE SERPL-MCNC: 111 MG/DL (ref 70–99)
HBA1C MFR BLD: 6.4 % (ref 4–6)
HDL (RMG): 27 MG/DL (ref 40–?)
INTERPRETATION: NORMAL
LDL CALCULATED (RMG): 46 MG/DL (ref 0–130)
POTASSIUM SERPL-SCNC: 4.7 MMOL/L (ref 3.4–5.3)
SODIUM SERPL-SCNC: 144 MMOL/L (ref 135–145)
TRIGLYCERIDES (RMG): 322 MG/DL (ref 0–149)
URINE CREATININE - RMG: 200 (ref 0–300)

## 2024-06-28 PROCEDURE — 80048 BASIC METABOLIC PNL TOTAL CA: CPT | Mod: 90

## 2024-06-28 PROCEDURE — 83036 HEMOGLOBIN GLYCOSYLATED A1C: CPT

## 2024-06-28 PROCEDURE — G2211 COMPLEX E/M VISIT ADD ON: HCPCS

## 2024-06-28 PROCEDURE — 82044 UR ALBUMIN SEMIQUANTITATIVE: CPT

## 2024-06-28 PROCEDURE — 36415 COLL VENOUS BLD VENIPUNCTURE: CPT

## 2024-06-28 PROCEDURE — 80061 LIPID PANEL: CPT | Mod: QW

## 2024-06-28 PROCEDURE — 99214 OFFICE O/P EST MOD 30 MIN: CPT

## 2024-06-28 RX ORDER — TAFAMIDIS 61 MG/1
CAPSULE, LIQUID FILLED ORAL
COMMUNITY

## 2024-06-28 ASSESSMENT — ANXIETY QUESTIONNAIRES
6. BECOMING EASILY ANNOYED OR IRRITABLE: SEVERAL DAYS
2. NOT BEING ABLE TO STOP OR CONTROL WORRYING: SEVERAL DAYS
7. FEELING AFRAID AS IF SOMETHING AWFUL MIGHT HAPPEN: NOT AT ALL
3. WORRYING TOO MUCH ABOUT DIFFERENT THINGS: NOT AT ALL
IF YOU CHECKED OFF ANY PROBLEMS ON THIS QUESTIONNAIRE, HOW DIFFICULT HAVE THESE PROBLEMS MADE IT FOR YOU TO DO YOUR WORK, TAKE CARE OF THINGS AT HOME, OR GET ALONG WITH OTHER PEOPLE: NOT DIFFICULT AT ALL
5. BEING SO RESTLESS THAT IT IS HARD TO SIT STILL: NOT AT ALL
GAD7 TOTAL SCORE: 3
1. FEELING NERVOUS, ANXIOUS, OR ON EDGE: SEVERAL DAYS
GAD7 TOTAL SCORE: 3

## 2024-06-28 ASSESSMENT — PATIENT HEALTH QUESTIONNAIRE - PHQ9
SUM OF ALL RESPONSES TO PHQ QUESTIONS 1-9: 10
5. POOR APPETITE OR OVEREATING: NOT AT ALL

## 2024-06-28 NOTE — PROGRESS NOTES
"SUBJECTIVE:    Abhay Taylor, is a 79 year old male presenting for the below:     1. Follow up of diabetes mellitus type 2.    Lab Results   Component Value Date    A1C 6.4 06/28/2024    A1C 6.4 12/19/2023    A1C 6.8 05/31/2023    A1C 6.3 12/19/2022    A1C 6.3 05/23/2022        Glycemic control medications: Stopped Metformin   Home BG monitoring: Does not monitor  Blood pressure: does have hypertension. Not on medications  Cholesterol:  Control   good  Goal LDL <  70 . is taking statin. Atorvastatin  Tobacco abuse: absent, Quit in 2013  Taking ASA 81 mg daily: yes. Did watchmen procedure.   Annual eye exam: UTD. Son is optometrist.   Has neuropathy some numbness in feet and maybe in tips of fingers.     Hx of strokes: Decreased left sided vision    Per neuropsych work up 1/10/23: \"Exam is consistent with a minimum diagnosis of Mild Neurocognitive Disorder     MDD: Taking Sertraline 50 mg daily. Going well. No concerns.         12/19/2023    10:47 AM 5/20/2024    11:53 AM 6/28/2024     2:03 PM   PHQ   PHQ-9 Total Score 4 10 10   Q9: Thoughts of better off dead/self-harm past 2 weeks Not at all Not at all Not at all        OBJECTIVE:  Vitals:    06/28/24 1322   BP: 117/73   Pulse: 72   SpO2: 94%   Weight: 113.3 kg (249 lb 12.8 oz)    Body mass index is 34.84 kg/m .  General: no acute distress, cooperative with exam.  Eyes: no injection or drainage.  Ears: TM's and canals show no erythema, discharge, or effusion bilaterally.  Throat: moist mucous membranes, no tonsillar exudate or hypertrophy, posterior oral pharynx non-erythematous without lesions.  Neck: supple, no thyroid nodules or enlargement.  Lungs: clear to auscultation bilaterally, normal respiratory effort.  Heart: regular rate, normal S1 and S2, no murmurs.   Abdomen: normal bowel sounds, nontender, no palpable organomegaly.  Diabetic foot check: skin and nails within normal limits, palpable pedal pulses, intact sensation to monofilament.   Neuro: " "grossly intact   Psych: mental status normal, mood and affect appropriate.      ASSESSMENT / PLAN:      Abhay was seen today for diabetes and recheck medication.    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 Diet controlled. Not on medications. Discussed importance in compliance in all areas of diabetic control including diet, routine BS checks, absolute medication compliance, laboratory monitoring, and attending regular follow up appointments as ordered.  Failure to comply with instructions regarding diabetes will lead to a greater chance of poor diabetic control and therefore a greater chance of diabetes related complications such as CAD, CVA, PVD, and retinopathy/neuropathy/nephropathy. Based on level of diabetes control: Return to the clinic in every 3 months.  -     Hemoglobin A1C (RMG)  -     Lipid Profile ([facility])  -     Microalbumin ([facility])  -     VENOUS COLLECTION  -     Basic metabolic panel; Future  -     Med Therapy Management Referral    Cardiac amyloidosis (H)  Taking Vyndamax 61 mg daily. Following with Dr. Martinez from [facility]. Continue current medication regimen and treatment plan.     Moderate episode of recurrent major depressive disorder (H)  Taking Sertraline 50 mg daily. Stable/conrolled. Continue current medication regimen.     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 Per neuropsych work up 1/10/23: \"Exam is consistent with a minimum diagnosis of Mild Neurocognitive Disorder. Recommended follow up with Dr. Mcnulty in neurology per note.     Alisa Osorio, APRN CNP on 6/28/2024 at 5:33 PM  "

## 2024-06-28 NOTE — LETTER
July 3, 2024          Abhay Vásquezers  [address] [address]        Hi Abhay,     Your urine microalbumin test returned normal which tests for albumin or protein in the urine.     Your Basic Metabolic panel (BMP), the testing of your blood sugar, kidney function, and electrolytes was normal with the exception of a elevated glucose which correlates with your elevated A1c results.     Your lipid profile including cholesterol results showed normal cholesterol as LDL results, low HDL and elevated Triglycerides. Recommend continuing the Atorvastatin along with a healthy diet and exercise.     Please let me know if you have questions or concerns.      Sincerely,   KAJAL Mack CNP     Resulted Orders   Hemoglobin A1C (RMG)   Result Value Ref Range    Hemoglobin A1C 6.4 (A) 4.0 - 6.0 %   Lipid Profile (RMG)   Result Value Ref Range    Cholesterol 137 100 - 199 mg/dL    HDL 27 (A) 40 mg/dL    Triglycerides 322 (A) 0 - 149 mg/dL    LDL CALCULATED (RMG) 46 0 - 130 mg/dL    Patient Fasting? No    Microalbumin (RMG)   Result Value Ref Range    Albumin mg/L 80 (A) 0 - 10    Urine Creatinine Mg/Dl 200 0 - 300    A/C Ratio mg/g <30     Interpretation normal    Basic metabolic panel   Result Value Ref Range    Sodium 144 135 - 145 mmol/L    Potassium 4.7 3.4 - 5.3 mmol/L    Chloride 107 98 - 107 mmol/L    Carbon Dioxide (CO2) 29 22 - 29 mmol/L    Anion Gap 8 7 - 15 mmol/L    Urea Nitrogen 22.6 8.0 - 23.0 mg/dL    Creatinine 1.11 0.67 - 1.17 mg/dL    GFR Estimate 68 >60 mL/min/1.73m2      Comment:      eGFR calculated using 2021 CKD-EPI equation.    Calcium 9.1 8.8 - 10.2 mg/dL    Glucose 111 (H) 70 - 99 mg/dL    Patient Fasting > 8hrs? No        If you have any questions or concerns, please call the clinic at the number listed above.       Sincerely,      KAJAL Centeno CNP

## 2024-07-01 RX ORDER — PSEUDOEPHED/ACETAMINOPH/DIPHEN 30MG-500MG
TABLET ORAL
Qty: 180 TABLET | Refills: 3 | Status: SHIPPED | OUTPATIENT
Start: 2024-07-01

## 2024-07-22 DIAGNOSIS — E11.42 DIABETIC POLYNEUROPATHY ASSOCIATED WITH TYPE 2 DIABETES MELLITUS (H): ICD-10-CM

## 2024-07-22 RX ORDER — GABAPENTIN 600 MG/1
TABLET ORAL
Qty: 90 TABLET | Refills: 2 | Status: SHIPPED | OUTPATIENT
Start: 2024-07-22 | End: 2024-07-23 | Stop reason: DRUGHIGH

## 2024-07-22 NOTE — TELEPHONE ENCOUNTER
Med: Gabapentin      LOV (related): 6/28/24      Due for F/U around: 3 months     Next Appt: 7/23/24 With ADOLFO Castañeda

## 2024-07-23 ENCOUNTER — OFFICE VISIT (OUTPATIENT)
Dept: PHARMACY | Facility: PHYSICIAN GROUP | Age: 79
End: 2024-07-23
Payer: COMMERCIAL

## 2024-07-23 VITALS
BODY MASS INDEX: 35.04 KG/M2 | OXYGEN SATURATION: 94 % | WEIGHT: 251.2 LBS | HEART RATE: 61 BPM | SYSTOLIC BLOOD PRESSURE: 133 MMHG | DIASTOLIC BLOOD PRESSURE: 70 MMHG

## 2024-07-23 DIAGNOSIS — I43 CARDIAC AMYLOIDOSIS (H): ICD-10-CM

## 2024-07-23 DIAGNOSIS — M10.9 GOUT, UNSPECIFIED CAUSE, UNSPECIFIED CHRONICITY, UNSPECIFIED SITE: ICD-10-CM

## 2024-07-23 DIAGNOSIS — G31.84 MILD COGNITIVE IMPAIRMENT: ICD-10-CM

## 2024-07-23 DIAGNOSIS — F33.1 MODERATE EPISODE OF RECURRENT MAJOR DEPRESSIVE DISORDER (H): ICD-10-CM

## 2024-07-23 DIAGNOSIS — E85.4 CARDIAC AMYLOIDOSIS (H): ICD-10-CM

## 2024-07-23 DIAGNOSIS — E11.42 DIABETIC POLYNEUROPATHY ASSOCIATED WITH TYPE 2 DIABETES MELLITUS (H): ICD-10-CM

## 2024-07-23 DIAGNOSIS — I10 PRIMARY HYPERTENSION: ICD-10-CM

## 2024-07-23 DIAGNOSIS — E11.49 DM TYPE 2 CAUSING NEUROLOGICAL DISEASE (H): Primary | ICD-10-CM

## 2024-07-23 DIAGNOSIS — E78.5 DYSLIPIDEMIA: ICD-10-CM

## 2024-07-23 DIAGNOSIS — I48.20 CHRONIC ATRIAL FIBRILLATION (H): ICD-10-CM

## 2024-07-23 DIAGNOSIS — M19.90 ARTHRITIS: ICD-10-CM

## 2024-07-23 PROCEDURE — 99607 MTMS BY PHARM ADDL 15 MIN: CPT | Performed by: PHARMACIST

## 2024-07-23 PROCEDURE — 99605 MTMS BY PHARM NP 15 MIN: CPT | Performed by: PHARMACIST

## 2024-07-23 RX ORDER — GABAPENTIN 300 MG/1
300 CAPSULE ORAL 2 TIMES DAILY
Qty: 180 CAPSULE | Refills: 3 | Status: CANCELLED | OUTPATIENT
Start: 2024-07-23

## 2024-07-23 RX ORDER — GABAPENTIN 600 MG/1
300 TABLET ORAL 2 TIMES DAILY
Qty: 90 TABLET | Refills: 2 | Status: SHIPPED | OUTPATIENT
Start: 2024-07-23

## 2024-07-23 NOTE — PROGRESS NOTES
Medication Therapy Management (MTM) Encounter    ASSESSMENT:                            Medication Adherence/Access: No issues identified    Diabetes:   A1c at goal of <7%, but discussed benefits for trial of Ozempic for blood sugars, appetite and cardio protection.     Hypertension /AFib/Cardiac amyloidosis:   Patient would benefit from GLP-1 RA therapy for cardioprotective properties. Will continue follow-up with cardiologist.    Hyperlipidemia   Stable, no changes     Mental Health/mild cognitive impairment:   Stable, no changes    Gout:   Stable, no changes but will be due for updated uric acid in the future. Should use reduced dose colchicine when needed for next flare due to myalgia risks related to medication combination.     Arthritis/neuropathy:  Patient would benefit from diclofenac gel to help with increased pain    PLAN:                            Start Ozempic 0.25 mg weekly under the skin for 4 weeks,, then increase to Ozempic 0.5 mg weekly for 2 weeks  You can use diclofenac gel as needed for pain in your legs    Future visit- Recheck uric acid after taking allopurinol consistently.     Follow-up: 4-6 weeks    SUBJECTIVE/OBJECTIVE:                          Abhay Taylor is a 79 year old male seen for an initial visit. He was referred to me from KAJAL Centeno CNP.    Reason for visit: medication review.    Allergies/ADRs: Reviewed in chart  Past Medical History: Reviewed in chart  Tobacco: He reports that he quit smoking about 11 years ago. His smoking use included cigarettes. He started smoking about 21 years ago. He has never used smokeless tobacco.    Medication Adherence/Access: no issues reported    Diabetes   Patient is not currently taking any medications for diabetes  Current diabetes symptoms: none  Blood sugar monitoring: never  Increased craving for sweets.   Not active.      Eye exam in the last 12 months? No  Foot exam is up to date    Hypertension /AFib/Cardiac amyloidosis:    Aspirin 81mg daily   Metoprolol tartrate 75mg twice daily  Vyndamax 61mg daily -   Watchman placed in 2022- Eliquis was stopped after that.  Following with Dr. Martinez from [facility] for amyloidosis.  History of 3 strokes- lost vision in left eye and partial vision in right.   Patient reports no current medication side effects  Patient/wife noticed difference in his breathing after starting on Vyndamax but got worse when they were in [place], reports getting better after returning. Patient reports excessive fatigue from always being due to afib.  Has appt with cardiologist this week.     Hyperlipidemia   atorvastatin 20 mg daily  Fenofibrate 160 mg once daily  Patient reports no significant myalgias or flushing symptoms.     Mental Health /mild cognitive impairment:   Sertraline 50mg once daily.   Patient reports no current medication side effects or concerns at this time.      Gout:   allopurinol 300 mg - was not taking consistently previously.   colchicine 0.6 mg as needed for flares.   Patient is experiencing the following medication side effects: none.   Discussed increased risk of myopathy with colchicine with statin and fenofibrate.     Arthritis/neuropathy:  Acetaminophen 500 mg as needed.   Gabapentin 300 mg  Patient reports increased pain in his legs when they were abroad in [place]. Tried CBD topical cream, helped for a couple days. Has also tried other topical treatments (Icy Hot).  Patient has been weaning down on gabapentin, has decreased to 300 mg twice daily. Does not report any concerns today.    Today's Vitals: /70   Pulse 61   Wt 251 lb 3.2 oz (113.9 kg)   SpO2 94%   BMI 35.04 kg/m    ----------------    I spent 30 minutes with this patient today. All changes were made via collaborative practice agreement with Sean Madrid MD. A copy of the visit note was provided to the patient's provider(s).    A summary of these recommendations was mailed to the  patient.    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

## 2024-07-23 NOTE — LETTER
"Recommended To-Do List      Prepared on: Jul 23, 2024       You can get the best results from your medications by completing the items on this \"To-Do List.\"      Bring your To-Do List when you go to your doctor. And, share it with your family or caregivers.    My To-Do List:  What we talked about: What I should do:   A new medication that may be of benefit to you    Start using diclofenac (VOLTAREN) gel as needed for pain.           What we talked about: What I should do:   A new medication that may be of benefit to you    Start taking Ozempic (0.25 or 0.5 MG/DOSE)- 0.25mg weekly for 4 weeks, then increase to 0.5mg weekly          What we talked about: What I should do:                     "

## 2024-07-23 NOTE — LETTER
_  Medication List        Prepared on: Jul 23, 2024     Bring your Medication List when you go to the doctor, hospital, or   emergency room. And, share it with your family or caregivers.     Note any changes to how you take your medications.  Cross out medications when you no longer use them.    Medication How I take it Why I use it Prescriber   acetaminophen (TYLENOL) 500 MG tablet TAKE 1 TO 2 TABLETS(500 TO 1000 MG) BY MOUTH EVERY 6 HOURS AS NEEDED FOR MILD PAIN Chronic pain of left knee Armando Jorge MD   allopurinol (ZYLOPRIM) 300 MG tablet Take 1 tablet (300 mg) by mouth daily Gout Sean Madrid MD   aspirin (ASA) 81 MG EC tablet Take 81 mg by mouth Heart Patient Reported   atorvastatin (LIPITOR) 20 MG tablet TAKE 1 TABLET(20 MG) BY MOUTH DAILY Cholesterol Sean Madrid MD   colchicine (COLCRYS) 0.6 MG tablet Take 1 tablet every day by oral route. 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 Martinez          Add new medications, over-the-counter drugs, herbals, vitamins, or  minerals in the blank rows below.    Medication How I take it Why I use it Prescriber                                      Allergies:       No Known Allergies        Side effects I have had:      No Known Side Effects        Other Information:              My notes and questions:

## 2024-07-23 NOTE — LETTER
July 23, 2024  Abhay Taylor  [address] [place] [facility] [place] [facility] [address]    Dear Mr. Taylor, [facility]     Thank you for talking with me on Jul 23, 2024 about your health and medications. As a follow-up to our conversation, I have included two documents:      Your Recommended To-Do List has steps you should take to get the best results from your medications.  Your Medication List will help you keep track of your medications and how to take them.    If you want to talk about these documents, please call Faith Pimentel RPH at phone: [phone], Monday-Friday 8-4:30pm.    I look forward to working with you and your doctors to make sure your medications work well for you.    Sincerely,  Faith Pimentel RPH  [facility] Pharmacist, [facility]

## 2024-07-23 NOTE — PATIENT INSTRUCTIONS
"Recommendations from today's MTM visit:                                                       Start Ozempic 0.25 mg weekly under the skin for 4 weeks,, then increase to Ozempic 0.5 mg weekly for 2 weeks  You can use diclofenac gel as needed for pain in your legs      Follow-up: Return in about 4-6 weeks (around 8/20/2024).    It was great speaking with you today.  I value your experience and would be very thankful for your time in providing feedback in our clinic survey. In the next few days, you may receive an email or text message from [employer] with a link to a survey related to your  clinical pharmacist.\"     My Clinical Pharmacist's contact information:                                                      Please feel free to contact me with any questions or concerns you have.      Faith Pimentel, Pharm.D, [facility]  Medication Therapy Management Pharmacist  [phone]      "

## 2024-07-26 ENCOUNTER — TELEPHONE (OUTPATIENT)
Dept: FAMILY MEDICINE | Facility: CLINIC | Age: 79
End: 2024-07-26

## 2024-07-26 NOTE — TELEPHONE ENCOUNTER
Prior authorization done via CoverMyMeds for Ozempic. Prior authorization approved with approval dates 4/27/24-7/26/25. Pharmacy notified of approval. Laurel Valenzuela

## 2024-08-08 DIAGNOSIS — E11.49 DM TYPE 2 CAUSING NEUROLOGICAL DISEASE (H): Primary | ICD-10-CM

## 2024-08-09 NOTE — CONFIDENTIAL NOTE
Med: ATORVASTATIN    LOV (related): 6/28/24    Last Lab: 6/28/24      Due for F/U around: 9/2024 FOR DM CHECK    Next Appt: NONE        Cholesterol   Date Value Ref Range Status   06/28/2024 137 100 - 199 mg/dL Final   05/31/2023 132 100 - 199 mg/dL Final   05/23/2022 121 100 - 199 mg/dL Final     HDL Cholesterol   Date Value Ref Range Status   05/31/2023 32 (L) >39 mg/dL Final   05/23/2022 29 (L) >39 mg/dL Final     HDL   Date Value Ref Range Status   06/28/2024 27 (A) 40 mg/dL Final     LDL Cholesterol Calculated   Date Value Ref Range Status   05/31/2023 56 0 - 99 mg/dL Final   05/23/2022 52 0 - 99 mg/dL Final     LDL CALCULATED (RMG)   Date Value Ref Range Status   06/28/2024 46 0 - 130 mg/dL Final     Triglycerides   Date Value Ref Range Status   06/28/2024 322 (A) 0 - 149 mg/dL Final   05/31/2023 281 (H) 0 - 149 mg/dL Final   05/23/2022 251 (H) 0 - 149 mg/dL Final     Cholesterol/HDL Ratio   Date Value Ref Range Status   07/13/2015 7.7 (H) 0.0 - 5.0 Final

## 2024-08-11 RX ORDER — ATORVASTATIN CALCIUM 20 MG/1
TABLET, FILM COATED ORAL
Qty: 90 TABLET | Refills: 0 | Status: SHIPPED | OUTPATIENT
Start: 2024-08-11

## 2024-08-29 NOTE — PATIENT INSTRUCTIONS
Preparing for Your Surgery      Name:  Sanchez Muñoz   MRN:  [id number]   :  1982   Today's Date:  2024       Arriving for surgery:  Surgery date:  24  Arrival time:  6.00AM    Please come to:     Please come to:       [facility]    [address] [place]  [address]     The [facility] [place] Patient/Visitor Ramp is at [address]. Patients and visitors who self-park will receive the reduced hospital parking rate. If the Patient /Visitor Ramp is full, please follow the signs to the [employer] car park located at the main hospital entrance.       parking is available (24 hours/ 7 days a week)      Discounted parking pass options are available for patients and visitors. They can be purchased at the [employer] desk at the main hospital entrance.     -    Stop at the security desk and they will direct surgery patients to the [facility] Check [facility] [phone]        - If you need directions, a wheelchair or an escort please stop at the Information/security desk in the lobby.     What can I eat or drink?  -  You may eat and drink normally up to 8 hours prior to arrival time. (Until 10.00PM)  -  You may have clear liquids until 2 hours prior to arrival time. (Until 4.00AM)    Examples of clear liquids:  Water  Clear broth  Juices (apple, white grape, white cranberry  and cider) without pulp  Noncarbonated, powder based beverages  (lemonade and Akil-Aid)  Sodas (Sprite, 7-Up, ginger ale and seltzer)  Coffee or tea (without milk or cream)  Gatorade    -  No Alcohol or cannabis products for at least 24 hours before surgery.     Which medicines can I take?    Hold Multivitamins for 7 days before surgery.  Hold Supplements for 7 days before surgery.  Hold Ibuprofen (Advil, Motrin) for 1 day(s) before surgery--unless otherwise directed by surgeon.  Hold Naproxen (Aleve) for 4  Weight loss is recommended  My [email or web address]    Vaccines due: Tetanus booster, Shingrix, pneumonia at pharmacy    You declined PSA recheck    Schedule US AAA Screen and colonoscopy    Labs today    Your cardiology doctor visit February did Echo and f/u one year.    You thought you had all of your medications       days before surgery.    Hold Trulicity injection on Monday 9/2/24.  Continue taking 81 mg aspirin and begin taking Brilinta 60 mg twice daily on 8/31/24.   Please stop taking Plavix on 8/30/24.    -  DO NOT take these medications the day of surgery:  Continue to hold Trulicity, Plavix.  Lisinopril (Zestril)    -  PLEASE TAKE these medications the day of surgery:  Lipitor     How do I prepare myself?  - Please take 2 showers (one the night prior to surgery and one the morning of surgery) using Scrubcare or Hibiclens soap.    Use this soap only from the neck to your toes.     Leave the soap on your skin for one minute--then rinse thoroughly.      You may use your own shampoo and conditioner. No other hair products.   - Please remove all jewelry and body piercings.  - No lotions, deodorants or fragrance.  - No makeup or fingernail polish.   - Bring your ID and insurance card.    -If you use a CPAP machine, please bring the CPAP machine, tubing, and mask to hospital.    -If you have a Deep Brain Stimulator, Spinal Cord Stimulator, or any Neuro Stimulator device---you must bring the remote control to the hospital.      ALL PATIENTS GOING HOME THE SAME DAY OF SURGERY ARE REQUIRED TO HAVE A RESPONSIBLE ADULT TO DRIVE AND BE IN ATTENDANCE WITH THEM FOR 24 HOURS FOLLOWING SURGERY.    Covid testing policy as of 12/06/2022  Your surgeon will notify and schedule you for a COVID test if one is needed before surgery--please direct any questions or COVID symptoms to your surgeon      Questions or Concerns:    - For any questions regarding the day of surgery or your hospital stay, please contact the Pre Admission Nursing Office at [phone].       - If you have health changes between today and your surgery, please call your surgeon.       - For questions after surgery, please call your surgeons office.           Current Visitor Guidelines    You may have 2 visitors in the pre op area.    Visiting hours: 8 a.m. to 8:30  p.m.    Patients confirmed or suspected to have symptoms of COVID 19 or flu:     No visitors allowed for adult patients.   Children (under age 18) can have 1 named visitor.     People who are sick or showing symptoms of COVID 19 or flu:    Are not allowed to visit patients--we can only make exceptions in special situations.       Please follow these guidelines for your visit:          Please maintain social distance          Masking is optional--however at times you may be asked to wear a mask for the safety of yourself and others     Clean your hands with alcohol hand . Do this when you arrive at and leave the building and patient room,    And again after you touch your mask or anything in the room.     Go directly to and from the room you are visiting.     Stay in the patient s room during your visit. Limit going to other places in the hospital as much as possible     Leave bags and jackets at home or in the car.     For everyone s health, please don t come and go during your visit. That includes for smoking   during your visit.

## 2024-09-03 DIAGNOSIS — M1A.09X0 IDIOPATHIC CHRONIC GOUT OF MULTIPLE SITES WITHOUT TOPHUS: ICD-10-CM

## 2024-09-04 RX ORDER — ALLOPURINOL 300 MG/1
300 TABLET ORAL DAILY
Qty: 90 TABLET | Refills: 3 | Status: SHIPPED | OUTPATIENT
Start: 2024-09-04

## 2024-10-08 DIAGNOSIS — Z86.73 HISTORY OF STROKE: ICD-10-CM

## 2024-10-08 NOTE — TELEPHONE ENCOUNTER
Med: Fenofibrate       LOV (related): 6/28/24    Last Lab: 6/28/24      Due for F/U around: 6 months for DM check  12/28/24    Next Appt: 11/5/24 With ADOLFO Castañeda and (med/dm) with Julius           BP Readings from Last 3 Encounters:   07/23/24 133/70   06/28/24 117/73   05/20/24 119/72       Last Comprehensive Metabolic Panel:  Lab Results   Component Value Date     06/28/2024    POTASSIUM 4.7 06/28/2024    CHLORIDE 107 06/28/2024    CO2 29 06/28/2024    ANIONGAP 8 06/28/2024     (H) 06/28/2024    BUN 22.6 06/28/2024    CR 1.11 06/28/2024    GFRESTIMATED 68 06/28/2024    STERLING 9.1 06/28/2024

## 2024-10-08 NOTE — TELEPHONE ENCOUNTER
FENOFIBRATE REFILL REQUEST- HASN'T BEEN FILLED AT [facility] SINCE 2020. [facility] SENT IN LAST REFILLS AFTER A PROCEDURE BUT PATIENT IS NOW COMPLETELY OUT-

## 2024-10-09 RX ORDER — FENOFIBRATE 160 MG/1
160 TABLET ORAL DAILY
Qty: 90 TABLET | Refills: 1 | Status: SHIPPED | OUTPATIENT
Start: 2024-10-09

## 2024-11-05 ENCOUNTER — OFFICE VISIT (OUTPATIENT)
Dept: FAMILY MEDICINE | Facility: CLINIC | Age: 79
End: 2024-11-05

## 2024-11-05 ENCOUNTER — OFFICE VISIT (OUTPATIENT)
Dept: PHARMACY | Facility: PHYSICIAN GROUP | Age: 79
End: 2024-11-05
Payer: COMMERCIAL

## 2024-11-05 VITALS
HEART RATE: 72 BPM | SYSTOLIC BLOOD PRESSURE: 119 MMHG | OXYGEN SATURATION: 97 % | BODY MASS INDEX: 35.57 KG/M2 | DIASTOLIC BLOOD PRESSURE: 67 MMHG | WEIGHT: 255 LBS

## 2024-11-05 VITALS
SYSTOLIC BLOOD PRESSURE: 119 MMHG | DIASTOLIC BLOOD PRESSURE: 67 MMHG | BODY MASS INDEX: 35.57 KG/M2 | OXYGEN SATURATION: 97 % | WEIGHT: 255 LBS | HEART RATE: 72 BPM

## 2024-11-05 DIAGNOSIS — E11.49 DM TYPE 2 CAUSING NEUROLOGICAL DISEASE (H): Primary | ICD-10-CM

## 2024-11-05 DIAGNOSIS — I69.398 NEUROLOGIC DEFICIT DUE TO OLD ISCHEMIC STROKE: Primary | ICD-10-CM

## 2024-11-05 DIAGNOSIS — F33.1 MODERATE EPISODE OF RECURRENT MAJOR DEPRESSIVE DISORDER (H): ICD-10-CM

## 2024-11-05 DIAGNOSIS — G31.84 MILD COGNITIVE IMPAIRMENT: ICD-10-CM

## 2024-11-05 DIAGNOSIS — E11.49 DM TYPE 2 CAUSING NEUROLOGICAL DISEASE (H): ICD-10-CM

## 2024-11-05 DIAGNOSIS — I43 CARDIAC AMYLOIDOSIS (H): ICD-10-CM

## 2024-11-05 DIAGNOSIS — E85.4 CARDIAC AMYLOIDOSIS (H): ICD-10-CM

## 2024-11-05 DIAGNOSIS — I10 PRIMARY HYPERTENSION: ICD-10-CM

## 2024-11-05 DIAGNOSIS — R29.818 NEUROLOGIC DEFICIT DUE TO OLD ISCHEMIC STROKE: Primary | ICD-10-CM

## 2024-11-05 DIAGNOSIS — E78.5 DYSLIPIDEMIA: ICD-10-CM

## 2024-11-05 PROCEDURE — 99606 MTMS BY PHARM EST 15 MIN: CPT | Performed by: PHARMACIST

## 2024-11-05 PROCEDURE — 99214 OFFICE O/P EST MOD 30 MIN: CPT | Performed by: FAMILY MEDICINE

## 2024-11-05 PROCEDURE — 99607 MTMS BY PHARM ADDL 15 MIN: CPT | Performed by: PHARMACIST

## 2024-11-05 PROCEDURE — G2211 COMPLEX E/M VISIT ADD ON: HCPCS | Performed by: FAMILY MEDICINE

## 2024-11-05 NOTE — PROGRESS NOTES
Seeing with MTM and most of the documentation is in her note.  Has a watchman device  ROS:  General and Resp. completed and negative except as noted above    Histories: reviewed    OBJECTIVE:                                                    /67   Pulse 72   Wt 115.7 kg (255 lb)   SpO2 97%   BMI 35.57 kg/m    Body mass index is 35.57 kg/m .   Resp effort = Calm regular breathing     ASSESSMENT/PLAN:                                                    Quality gaps reviewed    Abhay was seen today for follow up.    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 Due to the added complexity in care, I will continue to support Abhay in the subsequent management and with ongoing continuity of care.    Work on weight loss  Starting ozempic is going to be a good idea.    Health maintenance items are reviewed in Epic and correct as of today:    Sean Madrid MD  [facility]  Family Practice  [facility]  [phone]    For any issues my office # is [phone]

## 2024-11-05 NOTE — PROGRESS NOTES
Medication Therapy Management (MTM) Encounter    ASSESSMENT:                            Medication Adherence/Access: No issues identified.    Diabetes   Patient is meeting A1c goal of < 7%.  Patient would benefit from starting Ozempic for CV protection, weight benefits.     Hypertension /AFib/Cardiac amyloidosis  Patient is meeting blood pressure goal of < 140/90mmHg.    Hyperlipidemia   Stable.    Mental Health /mild cognitive impairment  Stable.    PLAN:                            1. Ozempic 0.25mg weekly once every week.     Follow-up: Return in about 4 weeks (around 12/3/2024) for MTM visit in clinic.    SUBJECTIVE/OBJECTIVE:                          Abhay Taylor is a 79 year old male seen for a follow-up visit.       Reason for visit: medication review.    Allergies/ADRs: Reviewed in chart  Past Medical History: Reviewed in chart  Tobacco: He reports that he quit smoking about 11 years ago. His smoking use included cigarettes. He started smoking about 21 years ago. He has never used smokeless tobacco.  Alcohol: not currently using    Medication Adherence/Access: no issues reported.    Diabetes   Was prescribed Ozempic 0.25mg weekly in July and PA was approved but didn't start it, wasn't sure if he wanted to.   Would like to work on weight loss to help joint pains and energy levels.   Current diabetes symptoms: none  Increased craving for sweets.   Not active.      Blood sugar monitoring: never  Eye exam in the last 12 months? No  Foot exam is up to date    Hypertension /AFib/Cardiac amyloidosis  Aspirin 81mg daily   Metoprolol tartrate 75mg twice daily  Vyndamax 61mg daily    Watchman placed in 2022- Eliquis was stopped after that.  Following with Dr. 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  History of 3 strokes- lost vision in left eye and partial vision in right.   Patient reports no current medication side effects     Hyperlipidemia   atorvastatin 20 mg daily  Fenofibrate 160 mg once  daily  Patient reports no significant myalgias      Mental Health /mild cognitive impairment  Sertraline 50mg once daily   Patient reports no current medication side effects or concerns at this time.        Today's Vitals: /67   Pulse 72   Wt 255 lb (115.7 kg)   SpO2 97%   BMI 35.57 kg/m    ----------------    I spent 30 minutes with this patient today. All changes were made via collaborative practice agreement with Sean Madrid MD. A copy of the visit note was provided to the patient's provider(s).    A summary of these recommendations was given to the patient.    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

## 2024-11-05 NOTE — PATIENT INSTRUCTIONS
"Recommendations from today's MTM visit:                                                       1. Ozempic 0.25mg weekly once every week.     Follow-up: Return in about 4 weeks (around 12/3/2024) for MTM visit in clinic.    It was great speaking with you today.  I value your experience and would be very thankful for your time in providing feedback in our clinic survey. In the next few days, you may receive an email or text message from [facility] with a link to a survey related to your  clinical pharmacist.\"     My Clinical Pharmacist's contact information:                                                      Please feel free to contact me with any questions or concerns you have.      Faith Pimentel, Pharm.D, [facility]  Medication Therapy Management Pharmacist  [phone]      "

## 2024-12-09 ENCOUNTER — MEDICAL CORRESPONDENCE (OUTPATIENT)
Dept: FAMILY MEDICINE | Facility: CLINIC | Age: 79
End: 2024-12-09

## 2024-12-21 DIAGNOSIS — F33.41 RECURRENT MAJOR DEPRESSION IN PARTIAL REMISSION (H): ICD-10-CM

## 2025-01-03 DIAGNOSIS — E11.49 DM TYPE 2 CAUSING NEUROLOGICAL DISEASE (H): ICD-10-CM

## 2025-01-03 NOTE — TELEPHONE ENCOUNTER
Med: Ozempic    LOV (related): 11/5/24    Last Lab: 6/28/24      Due for F/U around: 12/28/24    Next Appt: Not Scheduled        Lab Results   Component Value Date    A1C 6.4 06/28/2024    A1C 6.4 12/19/2023    A1C 6.8 05/31/2023    A1C 6.3 12/19/2022    A1C 6.3 05/23/2022

## 2025-01-05 RX ORDER — SEMAGLUTIDE 0.68 MG/ML
INJECTION, SOLUTION SUBCUTANEOUS
Qty: 3 ML | Refills: 0 | Status: SHIPPED | OUTPATIENT
Start: 2025-01-05

## 2025-01-29 ENCOUNTER — TELEPHONE (OUTPATIENT)
Dept: FAMILY MEDICINE | Facility: CLINIC | Age: 80
End: 2025-01-29

## 2025-01-30 ENCOUNTER — TRANSFERRED RECORDS (OUTPATIENT)
Dept: FAMILY MEDICINE | Facility: CLINIC | Age: 80
End: 2025-01-30

## 2025-02-04 ENCOUNTER — OFFICE VISIT (OUTPATIENT)
Dept: PHARMACY | Facility: PHYSICIAN GROUP | Age: 80
End: 2025-02-04
Payer: COMMERCIAL

## 2025-02-04 VITALS
WEIGHT: 256 LBS | HEART RATE: 68 BPM | SYSTOLIC BLOOD PRESSURE: 128 MMHG | DIASTOLIC BLOOD PRESSURE: 79 MMHG | BODY MASS INDEX: 35.7 KG/M2 | OXYGEN SATURATION: 96 %

## 2025-02-04 DIAGNOSIS — E85.4 CARDIAC AMYLOIDOSIS (H): ICD-10-CM

## 2025-02-04 DIAGNOSIS — G31.84 MILD COGNITIVE IMPAIRMENT: ICD-10-CM

## 2025-02-04 DIAGNOSIS — E11.49 DM TYPE 2 CAUSING NEUROLOGICAL DISEASE (H): Primary | ICD-10-CM

## 2025-02-04 DIAGNOSIS — I10 PRIMARY HYPERTENSION: ICD-10-CM

## 2025-02-04 DIAGNOSIS — I43 CARDIAC AMYLOIDOSIS (H): ICD-10-CM

## 2025-02-04 DIAGNOSIS — I48.20 CHRONIC ATRIAL FIBRILLATION (H): ICD-10-CM

## 2025-02-04 DIAGNOSIS — E78.5 DYSLIPIDEMIA: ICD-10-CM

## 2025-02-04 DIAGNOSIS — F33.1 MODERATE EPISODE OF RECURRENT MAJOR DEPRESSIVE DISORDER (H): ICD-10-CM

## 2025-02-04 DIAGNOSIS — M10.071 ACUTE IDIOPATHIC GOUT OF RIGHT FOOT: ICD-10-CM

## 2025-02-04 DIAGNOSIS — M10.9 GOUT, UNSPECIFIED CAUSE, UNSPECIFIED CHRONICITY, UNSPECIFIED SITE: ICD-10-CM

## 2025-02-04 LAB
ANION GAP SERPL CALCULATED.3IONS-SCNC: 8 MMOL/L (ref 7–15)
BUN SERPL-MCNC: 20.4 MG/DL (ref 8–23)
CALCIUM SERPL-MCNC: 9.6 MG/DL (ref 8.8–10.4)
CHLORIDE SERPL-SCNC: 103 MMOL/L (ref 98–107)
CREAT SERPL-MCNC: 1.17 MG/DL (ref 0.67–1.17)
EGFRCR SERPLBLD CKD-EPI 2021: 63 ML/MIN/1.73M2
FASTING STATUS PATIENT QL REPORTED: NO
GLUCOSE SERPL-MCNC: 179 MG/DL (ref 70–99)
HBA1C MFR BLD: 6.4 % (ref 4–6)
HCO3 SERPL-SCNC: 30 MMOL/L (ref 22–29)
POTASSIUM SERPL-SCNC: 4.2 MMOL/L (ref 3.4–5.3)
SODIUM SERPL-SCNC: 141 MMOL/L (ref 135–145)
URATE SERPL-MCNC: 6 MG/DL (ref 3.4–7)

## 2025-02-04 PROCEDURE — 99607 MTMS BY PHARM ADDL 15 MIN: CPT | Performed by: PHARMACIST

## 2025-02-04 PROCEDURE — 84550 ASSAY OF BLOOD/URIC ACID: CPT | Mod: ORL | Performed by: FAMILY MEDICINE

## 2025-02-04 PROCEDURE — 82435 ASSAY OF BLOOD CHLORIDE: CPT | Mod: ORL | Performed by: FAMILY MEDICINE

## 2025-02-04 PROCEDURE — 83036 HEMOGLOBIN GLYCOSYLATED A1C: CPT

## 2025-02-04 PROCEDURE — 99605 MTMS BY PHARM NP 15 MIN: CPT | Performed by: PHARMACIST

## 2025-02-04 PROCEDURE — 80048 BASIC METABOLIC PNL TOTAL CA: CPT | Mod: ORL | Performed by: FAMILY MEDICINE

## 2025-02-04 PROCEDURE — 36415 COLL VENOUS BLD VENIPUNCTURE: CPT

## 2025-02-04 PROCEDURE — 84520 ASSAY OF UREA NITROGEN: CPT | Mod: ORL | Performed by: FAMILY MEDICINE

## 2025-02-04 NOTE — LETTER
_  Medication List        Prepared on: Feb 4, 2025     Bring your Medication List when you go to the doctor, hospital, or   emergency room. And, share it with your family or caregivers.     Note any changes to how you take your medications.  Cross out medications when you no longer use them.    Medication How I take it Why I use it Prescriber   acetaminophen (TYLENOL) 500 MG tablet TAKE 1 TO 2 TABLETS(500 TO 1000 MG) BY MOUTH EVERY 6 HOURS AS NEEDED FOR MILD PAIN Chronic pain of left knee Armando Jorge MD   allopurinol (ZYLOPRIM) 300 MG tablet TAKE 1 TABLET(300 MG) BY MOUTH DAILY Gout Sean Madrid MD   aspirin (ASA) 81 MG EC tablet Take 81 mg by mouth  Heart Patient Reported   atorvastatin (LIPITOR) 20 MG tablet TAKE 1 TABLET(20 MG) BY MOUTH DAILY Diabetes Sean Madrid MD   colchicine (COLCRYS) 0.6 MG tablet Take 1 tablet every day by oral route as needed for flares. Gout Sean Madrid MD   fenofibrate (TRIGLIDE/LOFIBRA) 160 MG tablet Take 1 tablet (160 mg) by mouth daily. History of Stroke Sean Madrid MD   gabapentin (NEURONTIN) 600 MG tablet Take 0.5 tablets (300 mg) by mouth 2 times daily Nerve pain Sean Madrid MD   metoprolol tartrate 75 MG TABS Take 75 mg by mouth 2 times daily Chronic Atrial Fibrillation (H) Sean Madrid MD   Semaglutide, 1 MG/DOSE, (OZEMPIC) 4 MG/3ML pen Inject 1 mg subcutaneously every 7 days. Diabetes Sean Madrid MD   sertraline (ZOLOFT) 50 MG tablet TAKE 1 TABLET(50 MG) BY MOUTH DAILY Mood Sean Madrid MD   tafamidis (VYNDAMAX) 61 MG CAPS capsule Take 1 capsule by mouth daily Heart Penelope Nowak NP         Add new medications, over-the-counter drugs, herbals, vitamins, or  minerals in the blank rows below.    Medication How I take it Why I use it Prescriber                                      Allergies:      No Known Allergies        Side effects I have had:      No Known Side Effects        Other Information:              My  notes and questions:

## 2025-02-04 NOTE — PROGRESS NOTES
Medication Therapy Management (MTM) Encounter    ASSESSMENT:                            Medication Adherence/Access: No issues identified.    Diabetes   Patient is meeting A1c goal of < 7%.  Patient would benefit from increase in Ozempic to optimize CV and weight benefits.     Hypertension/AFib/Cardiac amyloidosis   Patient is meeting blood pressure goal of < 140/90mmHg.    Hyperlipidemia   Stable.    Mental Health /mild cognitive impairment  Stable    Gout  Update uric acid levels to adjust allopurinol if needed to get uric acid <6.     PLAN:                            1. Recheck Uric acid, A1c and BMP (kidney function) today.     2. Increase to 1mg Ozempic and we will request a dose increase change at the pharmacy.   If you aren't seeing the decreased appetite or having issues see me sooner.     Follow-up: Return in about 3 months (around 5/4/2025) for MTM visit in clinic.    SUBJECTIVE/OBJECTIVE:                          Abhay Taylor is a 80 year old male seen for a follow-up visit.       Reason for visit: medication review.    Allergies/ADRs: Reviewed in chart  Past Medical History: Reviewed in chart  Tobacco: He reports that he quit smoking about 12 years ago. His smoking use included cigarettes. He started smoking about 22 years ago. He has never used smokeless tobacco.  Alcohol: not currently using    Medication Adherence/Access: no issues reported.    Diabetes   Ozempic 0.5mg weekly - has done 4 then increased to 0.5mg daily, has 2 more doses left.   Would like to work on weight loss to help joint pains and energy levels.   Current diabetes symptoms: none  Increased craving for sweets, has not had improvements with this since starting GLP1 yet.   Not active.      Blood sugar monitoring: never  Eye exam in the last 12 months? No  Foot exam: due    Hypertension /AFib/Cardiac amyloidosis  Aspirin 81mg daily   Metoprolol tartrate 75mg twice daily  Vyndamax 61mg daily    Watchman placed in 2022- Eliquis was  stopped after that.  Following with Dr. Martinez from [facility] for amyloidosis.  History of 3 strokes- lost vision in left eye and partial vision in right.   Patient reports no current medication side effects     Hyperlipidemia   atorvastatin 20 mg daily  Fenofibrate 160 mg once daily  Patient reports no significant myalgias      Mental Health /mild cognitive impairment  Sertraline 50mg once daily   Patient reports no current medication side effects or concerns at this time.      Gout  allopurinol 300mg daily   colchicine only if really bad pain comes on  Patient reports no current pain concerns.   Patient is experiencing the following medication side effects: none.   Last uric acid in 2023.    Neuropathy  Gabapentin 300mg twice daily  - cutting 600mg tab  Ongoing soreness and aches in ankles at times.   Does have fatigue and concern for side effects, so dose has not been adjusted.      Today's Vitals: /79   Pulse 68   Wt 256 lb (116.1 kg)   SpO2 96%   BMI 35.70 kg/m    ----------------    I spent 25 minutes with this patient today. All changes were made via collaborative practice agreement with Sean Madrid MD. A copy of the visit note was provided to the patient's provider(s).    A summary of these recommendations was given to the patient.    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)   1 Current Medication: OZEMPIC, 0.25 OR 0.5 MG/DOSE, 2 MG/3ML pen (Discontinued)   Current Medication Sig: INJECT 0.25MG UNDER THE SKIN WEEKLY FOR 4 WEEKS THEN INCREASE TO 0.5MG WEEKLY FOR 2 WEEKS   Rationale: Dose too low - Dosage too low - Effectiveness   Recommendation: Increase Dose - Ozempic (1 MG/DOSE) 4 MG/3ML Sopn - 1mg weekly   Status: Accepted per CPA   Identified Date: 2/4/2025 Completed Date: 2/4/2025         Gout, unspecified cause, unspecified chronicity,  unspecified site   1 Current Medication: allopurinol (ZYLOPRIM) 300 MG tablet   Current Medication Sig: TAKE 1 TABLET(300 MG) BY MOUTH DAILY   Rationale: Medication requires monitoring - Needs additional monitoring - Effectiveness   Recommendation: Order Lab   Status: Accepted per CPA   Identified Date: 2/4/2025 Completed Date: 2/4/2025

## 2025-02-04 NOTE — PATIENT INSTRUCTIONS
"Recommendations from today's MTM visit:                                                       1. Recheck Uric acid, A1c and BMP (kidney function) today.     2. Increase to 1mg Ozempic and we will request a dose increase change at the pharmacy.   If you aren't seeing the decreased appetite or having issues see me sooner.     Follow-up: Return in about 3 months (around 5/4/2025) for MTM visit in clinic.    It was great speaking with you today.  I value your experience and would be very thankful for your time in providing feedback in our clinic survey. In the next few days, you may receive an email or text message from [employer] with a link to a survey related to your  clinical pharmacist.\"     My Clinical Pharmacist's contact information:                                                      Please feel free to contact me with any questions or concerns you have.      Faith Pimentel, Pharm.D, [facility]  Medication Therapy Management Pharmacist  [phone]      "

## 2025-02-04 NOTE — LETTER
"Recommended To-Do List      Prepared on: Feb 4, 2025       You can get the best results from your medications by completing the items on this \"To-Do List.\"      Bring your To-Do List when you go to your doctor. And, share it with your family or caregivers.    My To-Do List:  What we talked about: What I should do:   Your medication dosage being too low    Increase your dosage of Ozempic 1mg weekly          What we talked about: What I should do:   Needing additional monitoring    Get the following lab test(s): Uric acid           What we talked about: What I should do:                     "

## 2025-02-04 NOTE — LETTER
February 4, 2025  Abhay Taylor  [address] [place] [address]    Dear Mr. Taylor, [facility]     Thank you for talking with me on Feb 4, 2025 about your health and medications. As a follow-up to our conversation, I have included two documents:      Your Recommended To-Do List has steps you should take to get the best results from your medications.  Your Medication List will help you keep track of your medications and how to take them.    If you want to talk about these documents, please call Faith Pimentel RPH at phone: [phone], Monday-Friday 8-4:30pm.    I look forward to working with you and your doctors to make sure your medications work well for you.    Sincerely,  Faith Pimentel RPH  [facility] Pharmacist, [facility]

## 2025-02-05 NOTE — RESULT ENCOUNTER NOTE
Dear Abhay,     I am writing to report that your included test results are as expected.    Many labs contain some results that are slightly outside of the normal range, I have reviewed any of these results and they require no changes at this time.    Sena Madrid MD

## 2025-02-18 ENCOUNTER — MEDICAL CORRESPONDENCE (OUTPATIENT)
Dept: FAMILY MEDICINE | Facility: CLINIC | Age: 80
End: 2025-02-18

## 2025-03-12 ENCOUNTER — TRANSFERRED RECORDS (OUTPATIENT)
Dept: FAMILY MEDICINE | Facility: CLINIC | Age: 80
End: 2025-03-12

## 2025-04-01 DIAGNOSIS — E11.49 DM TYPE 2 CAUSING NEUROLOGICAL DISEASE (H): ICD-10-CM

## 2025-04-01 RX ORDER — SEMAGLUTIDE 1.34 MG/ML
1 INJECTION, SOLUTION SUBCUTANEOUS
Qty: 3 ML | Refills: 1 | Status: SHIPPED | OUTPATIENT
Start: 2025-04-01

## 2025-04-01 NOTE — TELEPHONE ENCOUNTER
Med: OZEMPIC, 1 MG/DOSE, 4 MG/3ML pen     LOV (related): 2/4/25 (MTM)    Last Lab: 2/4/25      Due for F/U around: Follow-up: Return in about 3 months (around 5/4/2025) for MTM visit in clinic.     Next Appt: 4/8/25 w/LB and 5/6/25 w/MTM        Lab Results   Component Value Date    A1C 6.4 02/04/2025    A1C 6.4 06/28/2024    A1C 6.4 12/19/2023    A1C 6.8 05/31/2023    A1C 6.3 12/19/2022

## 2025-04-07 NOTE — PATIENT INSTRUCTIONS
Patient Education   Preventive Care Advice   This is general advice given by our system to help you stay healthy. However, your care team may have specific advice just for you. Please talk to your care team about your preventive care needs.  Nutrition  Eat 5 or more servings of fruits and vegetables each day.  Try wheat bread, brown rice and whole grain pasta (instead of white bread, rice, and pasta).  Get enough calcium and vitamin D. Check the label on foods and aim for 100% of the RDA (recommended daily allowance).  Lifestyle  Exercise at least 150 minutes each week  (30 minutes a day, 5 days a week).  Do muscle strengthening activities 2 days a week. These help control your weight and prevent disease.  No smoking.  Wear sunscreen to prevent skin cancer.  Have a dental exam and cleaning every 6 months.  Yearly exams  See your health care team every year to talk about:  Any changes in your health.  Any medicines your care team has prescribed.  Preventive care, family planning, and ways to prevent chronic diseases.  Shots (vaccines)   HPV shots (up to age 26), if you've never had them before.  Hepatitis B shots (up to age 59), if you've never had them before.  COVID-19 shot: Get this shot when it's due.  Flu shot: Get a flu shot every year.  Tetanus shot: Get a tetanus shot every 10 years.  Pneumococcal, hepatitis A, and RSV shots: Ask your care team if you need these based on your risk.  Shingles shot (for age 50 and up)  General health tests  Diabetes screening:  Starting at age 35, Get screened for diabetes at least every 3 years.  If you are younger than age 35, ask your care team if you should be screened for diabetes.  Cholesterol test: At age 39, start having a cholesterol test every 5 years, or more often if advised.  Bone density scan (DEXA): At age 50, ask your care team if you should have this scan for osteoporosis (brittle bones).  Hepatitis C: Get tested at least once in your life.  STIs (sexually  transmitted infections)  Before age 24: Ask your care team if you should be screened for STIs.  After age 24: Get screened for STIs if you're at risk. You are at risk for STIs (including HIV) if:  You are sexually active with more than one person.  You don't use condoms every time.  You or a partner was diagnosed with a sexually transmitted infection.  If you are at risk for HIV, ask about PrEP medicine to prevent HIV.  Get tested for HIV at least once in your life, whether you are at risk for HIV or not.  Cancer screening tests  Cervical cancer screening: If you have a cervix, begin getting regular cervical cancer screening tests starting at age 21.  Breast cancer scan (mammogram): If you've ever had breasts, begin having regular mammograms starting at age 40. This is a scan to check for breast cancer.  Colon cancer screening: It is important to start screening for colon cancer at age 45.  Have a colonoscopy test every 10 years (or more often if you're at risk) Or, ask your provider about stool tests like a FIT test every year or Cologuard test every 3 years.  To learn more about your testing options, visit:   .  For help making a decision, visit:   https://bit.ly/yj11340.  Prostate cancer screening test: If you have a prostate, ask your care team if a prostate cancer screening test (PSA) at age 55 is right for you.  Lung cancer screening: If you are a current or former smoker ages 50 to 80, ask your care team if ongoing lung cancer screenings are right for you.  For informational purposes only. Not to replace the advice of your health care provider. Copyright   2023 [employer]. All rights reserved. Clinically reviewed by the [facility]. [employer] [id number] - REV 01/24.

## 2025-04-08 ENCOUNTER — ORDERS ONLY (AUTO-RELEASED) (OUTPATIENT)
Dept: FAMILY MEDICINE | Facility: CLINIC | Age: 80
End: 2025-04-08

## 2025-04-08 ENCOUNTER — OFFICE VISIT (OUTPATIENT)
Dept: FAMILY MEDICINE | Facility: CLINIC | Age: 80
End: 2025-04-08

## 2025-04-08 VITALS
HEART RATE: 60 BPM | OXYGEN SATURATION: 98 % | HEIGHT: 71 IN | DIASTOLIC BLOOD PRESSURE: 93 MMHG | BODY MASS INDEX: 35.42 KG/M2 | SYSTOLIC BLOOD PRESSURE: 121 MMHG | WEIGHT: 253 LBS

## 2025-04-08 DIAGNOSIS — R29.818 NEUROLOGIC DEFICIT DUE TO OLD ISCHEMIC STROKE: ICD-10-CM

## 2025-04-08 DIAGNOSIS — E11.40 TYPE 2 DIABETES MELLITUS WITH DIABETIC NEUROPATHY, WITHOUT LONG-TERM CURRENT USE OF INSULIN (H): ICD-10-CM

## 2025-04-08 DIAGNOSIS — I10 PRIMARY HYPERTENSION: ICD-10-CM

## 2025-04-08 DIAGNOSIS — Z12.11 SCREENING FOR MALIGNANT NEOPLASM OF COLON: ICD-10-CM

## 2025-04-08 DIAGNOSIS — E66.812 CLASS 2 SEVERE OBESITY DUE TO EXCESS CALORIES WITH SERIOUS COMORBIDITY AND BODY MASS INDEX (BMI) OF 35.0 TO 35.9 IN ADULT (H): ICD-10-CM

## 2025-04-08 DIAGNOSIS — G40.909 SEIZURE DISORDER (H): ICD-10-CM

## 2025-04-08 DIAGNOSIS — I43 CARDIAC AMYLOIDOSIS (H): ICD-10-CM

## 2025-04-08 DIAGNOSIS — Z00.00 ENCOUNTER FOR MEDICARE ANNUAL WELLNESS EXAM: Primary | ICD-10-CM

## 2025-04-08 DIAGNOSIS — F33.0 MILD EPISODE OF RECURRENT MAJOR DEPRESSIVE DISORDER: ICD-10-CM

## 2025-04-08 DIAGNOSIS — M1A.09X0 IDIOPATHIC CHRONIC GOUT OF MULTIPLE SITES WITHOUT TOPHUS: ICD-10-CM

## 2025-04-08 DIAGNOSIS — I69.398 NEUROLOGIC DEFICIT DUE TO OLD ISCHEMIC STROKE: ICD-10-CM

## 2025-04-08 DIAGNOSIS — E66.01 CLASS 2 SEVERE OBESITY DUE TO EXCESS CALORIES WITH SERIOUS COMORBIDITY AND BODY MASS INDEX (BMI) OF 35.0 TO 35.9 IN ADULT (H): ICD-10-CM

## 2025-04-08 DIAGNOSIS — I48.19 PERSISTENT ATRIAL FIBRILLATION (H): ICD-10-CM

## 2025-04-08 DIAGNOSIS — E85.4 CARDIAC AMYLOIDOSIS (H): ICD-10-CM

## 2025-04-08 LAB
ALBUMIN SERPL BCG-MCNC: 4.5 G/DL (ref 3.5–5.2)
ALP SERPL-CCNC: 62 U/L (ref 40–150)
ALT SERPL W P-5'-P-CCNC: 28 U/L (ref 0–70)
ANION GAP SERPL CALCULATED.3IONS-SCNC: 10 MMOL/L (ref 7–15)
AST SERPL W P-5'-P-CCNC: 29 U/L (ref 0–45)
BILIRUB SERPL-MCNC: 0.7 MG/DL
BUN SERPL-MCNC: 21.8 MG/DL (ref 8–23)
CALCIUM SERPL-MCNC: 9.5 MG/DL (ref 8.8–10.4)
CHLORIDE SERPL-SCNC: 103 MMOL/L (ref 98–107)
CHOLEST SERPL-MCNC: 139 MG/DL
CREAT SERPL-MCNC: 1.07 MG/DL (ref 0.67–1.17)
EGFRCR SERPLBLD CKD-EPI 2021: 71 ML/MIN/1.73M2
FASTING STATUS PATIENT QL REPORTED: YES
FASTING STATUS PATIENT QL REPORTED: YES
GLUCOSE SERPL-MCNC: 120 MG/DL (ref 70–99)
HCO3 SERPL-SCNC: 28 MMOL/L (ref 22–29)
HDLC SERPL-MCNC: 35 MG/DL
LDLC SERPL CALC-MCNC: 59 MG/DL
NONHDLC SERPL-MCNC: 104 MG/DL
POTASSIUM SERPL-SCNC: 4.7 MMOL/L (ref 3.4–5.3)
PROT SERPL-MCNC: 7.2 G/DL (ref 6.4–8.3)
SODIUM SERPL-SCNC: 141 MMOL/L (ref 135–145)
TRIGL SERPL-MCNC: 224 MG/DL

## 2025-04-08 PROCEDURE — 3074F SYST BP LT 130 MM HG: CPT

## 2025-04-08 PROCEDURE — 99397 PER PM REEVAL EST PAT 65+ YR: CPT | Mod: 25

## 2025-04-08 PROCEDURE — 36415 COLL VENOUS BLD VENIPUNCTURE: CPT

## 2025-04-08 PROCEDURE — 80061 LIPID PANEL: CPT | Mod: ORL

## 2025-04-08 PROCEDURE — 84155 ASSAY OF PROTEIN SERUM: CPT | Mod: ORL

## 2025-04-08 PROCEDURE — 99214 OFFICE O/P EST MOD 30 MIN: CPT | Mod: 25

## 2025-04-08 PROCEDURE — 84450 TRANSFERASE (AST) (SGOT): CPT | Mod: ORL

## 2025-04-08 PROCEDURE — G0439 PPPS, SUBSEQ VISIT: HCPCS

## 2025-04-08 PROCEDURE — 3078F DIAST BP <80 MM HG: CPT

## 2025-04-08 RX ORDER — METOPROLOL TARTRATE 50 MG
TABLET ORAL
COMMUNITY
Start: 2025-02-28

## 2025-04-08 SDOH — HEALTH STABILITY: PHYSICAL HEALTH: ON AVERAGE, HOW MANY MINUTES DO YOU ENGAGE IN EXERCISE AT THIS LEVEL?: 0 MIN

## 2025-04-08 SDOH — HEALTH STABILITY: PHYSICAL HEALTH: ON AVERAGE, HOW MANY DAYS PER WEEK DO YOU ENGAGE IN MODERATE TO STRENUOUS EXERCISE (LIKE A BRISK WALK)?: 0 DAYS

## 2025-04-08 ASSESSMENT — PATIENT HEALTH QUESTIONNAIRE - PHQ9
SUM OF ALL RESPONSES TO PHQ QUESTIONS 1-9: 5
SUM OF ALL RESPONSES TO PHQ QUESTIONS 1-9: 5
10. IF YOU CHECKED OFF ANY PROBLEMS, HOW DIFFICULT HAVE THESE PROBLEMS MADE IT FOR YOU TO DO YOUR WORK, TAKE CARE OF THINGS AT HOME, OR GET ALONG WITH OTHER PEOPLE: NOT DIFFICULT AT ALL

## 2025-04-08 ASSESSMENT — SOCIAL DETERMINANTS OF HEALTH (SDOH): HOW OFTEN DO YOU GET TOGETHER WITH FRIENDS OR RELATIVES?: ONCE A WEEK

## 2025-04-08 NOTE — PROGRESS NOTES
Preventive Care Visit  [facility]  Alisa Osorio, KAJAL CNP, Family Medicine  Apr 8, 2025      Assessment & Plan     Encounter for Medicare annual wellness exam  Age-appropriate preventative health maintenance along with diet, exercise and healthy weight discussed.     Class 2 severe obesity due to excess calories with serious comorbidity and body mass index (BMI) of 35.0 to 35.9 in adult (H)  Body mass index is 35.54 kg/m . Comorbidity: T2DM and HTN. Discussed importance of a healthy weight, along with diet and exercise    Type 2 diabetes mellitus with diabetic neuropathy, without long-term current use of insulin (H)  Abhay Taylor is meeting A1C goal of <7.0. Diet controlled. Not on medications. Discussed importance in compliance in all areas of diabetic control including diet, routine BS checks, absolute medication compliance, laboratory monitoring, and attending regular follow up appointments as ordered.  Failure to comply with instructions regarding diabetes will lead to a greater chance of poor diabetic control and therefore a greater chance of diabetes related complications such as CAD, CVA, PVD, and retinopathy/neuropathy/nephropathy. Based on level of diabetes control: Return to the clinic in every 6 months.  - Lipid Profile  - VENOUS COLLECTION  - Comprehensive metabolic panel  - Lipid Profile  - Comprehensive metabolic panel    Primary hypertension  BP slightly above goal today. Taking Metoprolol. Reviewed current HTN management. Goal BP <140/90. We today managed prescriptions with refills ensured to ensure availabilty of current medications. Reviewed lifestyle modifications. Required intervals for follow up on HTN, lab studies reviewed. Strongly recommened blood pressures are checked outside the clinic to ensure that BPs are remaining within guidelines. Instructed to contact me if the readings are not within guidelines on a regular basis so we can adjust treatment as needed. Reviewed  "side effects of medications, alarm signs and symptoms, and when to seek further care.    Persistent atrial fibrillation (H)  Taking Metoprolol. Stable. Watchman placed in 2022- Eliquis was stopped after that. Follows with cardiology. Continue current medication regimen.     Cardiac amyloidosis (H)  Taking Vyndamax 61 mg daily. Following with Dr. Martinez from [facility]. Continue current medication regimen and treatment plan.     Mild episode of recurrent major depressive disorder  Taking Sertraline 50 mg daily. Stable/conrolled. Continue current medication regimen.     Idiopathic chronic gout of multiple sites without tophus  Taking allopurinol and colchicine as needed. Uric acid at goal. Continue current medication regimen.     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 Per neuropsych work up 1/10/23: \"Exam is consistent with a minimum diagnosis of Mild Neurocognitive Disorder. Recommended follow up with Dr. Mcnulty in neurology per note     Seizure disorder (H)  Not on medication. No reoccurrence. Has taken Keppra in the past. Likely related to a TIA at the time. Continue current plan.     Screening for malignant neoplasm of colon  - [employer])      Patient has been advised of split billing requirements and indicates understanding: Yes        BMI  Estimated body mass index is 35.54 kg/m  as calculated from the following:    Height as of this encounter: 1.797 m (5' 10.75\").    Weight as of this encounter: 114.8 kg (253 lb).   Weight management plan: Discussed healthy diet and exercise guidelines    Counseling  Appropriate preventive services were addressed with this patient via screening, questionnaire, or discussion as appropriate for fall prevention, nutrition, physical activity, Tobacco-use cessation, social engagement, weight loss and cognition.  Checklist reviewing preventive services available has been given to the patient.  Reviewed patient's diet, addressing " concerns and/or questions.   Discussed possible causes of fatigue. The patient was provided with written information regarding signs of hearing loss.   The patient's PHQ-9 score is consistent with mild depression. He was provided with information regarding depression.       Work on weight loss  Regular exercise  See Patient Instructions    Return in about 6 months (around 10/8/2025) for Follow up.    Fred Blank is a 80 year old, presenting for the following:  Physical (Fasting/No concerns ) and Health Maintenance (Immunizations: Zoster due, must do at pharm /Colonoscopy: Due, last 2/24/22)            HPI    T2DM : Last A1c was 6.4 on 2/4/25. Taking Ozempic 1 mg weekly. Does not monitor blood glucose at home. Taking gabapentin for neuropathy symptoms. Plans to schedule eye exam.     A-fib: Taking Metoprolol. Follows with cardiology. Watchman placed in 2022- Eliquis was stopped after that. History of 3 strokes- lost vision in left eye and partial vision in right.     Cardia amyloidosis: Taking Vyndamax 61mg daily.  Following with Dr. Martinez from [facility] for amyloidosis    HLD: Taking Atorvastatin 20 mg daily. Fenofibrate 160 mg once daily. No significant myalgias      Gout:: Taking allopurinol. Uric acid at goal of 6 in the last 2 months. colchicine only if really bad pain comes on    MDD/mild cognitive impairment: taking sertraline. Trouble remembering words blames it on aging.       5/20/2024    11:53 AM 6/28/2024     2:03 PM 4/8/2025     8:50 AM   PHQ   PHQ-9 Total Score 10 10 5    Q9: Thoughts of better off dead/self-harm past 2 weeks Not at all Not at all Not at all       Patient-reported      Epilepsy: symptoms of seizure. No reoccurrence. Thought it was a TIA at the time.     Health maintenance  Colon: due      Advance Care Planning  Patient does not have a Health Care Directive: Patient states has Advance Directive and will bring in a copy to clinic.      4/8/2025   [facility]    How would you rate your overall physical health? (!) FAIR   Feel stress (tense, anxious, or unable to sleep) Only a little   (!) STRESS CONCERN      4/8/2025   Nutrition   Diet: Regular (no restrictions)         4/8/2025   Exercise   Days per week of moderate/strenous exercise 0 days   Average minutes spent exercising at this level 0 min   (!) EXERCISE CONCERN      4/8/2025   Social Factors   Frequency of gathering with friends or relatives Once a week   Worry food won't last until get money to buy more No   Food not last or not have enough money for food? No   Do you have housing? (Housing is defined as stable permanent housing and does not include staying ouside in a car, in a tent, in an abandoned building, in an overnight shelter, or couch-surfing.) Yes   Are you worried about losing your housing? No   Lack of transportation? Yes   Unable to get utilities (heat,electricity)? No    (!) TRANSPORTATION CONCERN PRESENT      4/8/2025   Fall Risk   Fallen 2 or more times in the past year? No    No   Trouble with walking or balance? Yes    Yes       Multiple values from one day are sorted in reverse-chronological order           4/8/2025   Activities of Daily Living- Home Safety   Needs help with the following daily activites None of the above   Safety concerns in the home None of the above         4/8/2025   Dental   Dentist two times every year? Yes         4/8/2025   Hearing Screening   Hearing concerns? (!) I NEED TO ASK PEOPLE TO SPEAK UP OR REPEAT THEMSELVES.    (!) TROUBLE FOLLOWING DIALOGUE IN THE THEATHER.       Multiple values from one day are sorted in reverse-chronological order   Hearing Screen: Wears hearing aids no screening done         4/8/2025   Driving Risk Screening   Patient/family members have concerns about driving No         4/8/2025   General Alertness/Fatigue Screening   Have you been more tired than usual lately? (!) YES         4/8/2025   Urinary Incontinence Screening   Bothered by  leaking urine in past 6 months No         Today's PHQ-9 Score:       2025     8:50 AM   PHQ-9 SCORE   PHQ-9 Total Score MyChart 5 (Mild depression)   PHQ-9 Total Score 5        Patient-reported         2025   Substance Use   Alcohol more than 3/day or more than 7/wk No   Do you have a current opioid prescription? No   How severe/bad is pain from 1 to 10? 4/10   Do you use any other substances recreationally? No    (!) PRESCRIPTION DRUGS       Multiple values from one day are sorted in reverse-chronological order     Social History     Tobacco Use    Smoking status: Former     Current packs/day: 0.00     Types: Cigarettes     Start date:      Quit date:      Years since quittin.2    Smokeless tobacco: Never    Tobacco comments:     10 years ago quit (24)    Substance Use Topics    Alcohol use: Yes     Alcohol/week: 2.0 standard drinks of alcohol     Types: 2 Standard drinks or equivalent per week    Drug use: Not Currently       ASCVD Risk   The ASCVD Risk score (Arabella MORTON, et al., 2019) failed to calculate for the following reasons:    Risk score cannot be calculated because patient has a medical history suggesting prior/existing ASCVD          Reviewed and updated as needed this visit by Provider   Tobacco   Meds   Med Hx  Surg Hx  Fam Hx            Past Medical History:   Diagnosis Date    Cerebrovascular accident (CVA) (H)     3 strokes over -    Chronic atrial fibrillation (H)     failed ablation X 3    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)     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     Hypertension     Peripheral polyneuropathy 2016    Presbycusis of both ears 2016    severe     Past Surgical History:   Procedure Laterality Date    EP ABLATION / EP STUDIES      RELEASE CARPAL TUNNEL BILATERAL       Lab work is in process  Current providers sharing in care for this patient include:  Patient Care Team:  Sean Madrid MD as PCP - General  "(Family Practice)  Sean Madrid MD as Assigned PCP  Faith Pimentel RPH as Pharmacist (Pharmacist)  Faith Pimentel RPH as Assigned MT Pharmacist    The following health maintenance items are reviewed in Epic and correct as of today:  Health Maintenance   Topic Date Due    ZOSTER IMMUNIZATION (1 of 2) Never done    RSV VACCINE (1 - 1-dose 75+ series) Never done    EYE EXAM  06/07/2023    DIABETIC FOOT EXAM  12/19/2024    COLORECTAL CANCER SCREENING  02/24/2025    COVID-19 Vaccine (8 - 2024-25 season) 04/15/2025    LIPID  06/28/2025    MICROALBUMIN  06/28/2025    A1C  08/04/2025    PHQ-9  10/08/2025    BMP  02/04/2026    URIC ACID  02/04/2026    MEDICARE ANNUAL WELLNESS VISIT  04/08/2026    FALL RISK ASSESSMENT  04/08/2026    ADVANCE CARE PLANNING  04/08/2030    DTAP/TDAP/TD IMMUNIZATION (2 - Td or Tdap) 12/22/2032    HEPATITIS C SCREENING  Completed    DEPRESSION ACTION PLAN  Completed    INFLUENZA VACCINE  Completed    Pneumococcal Vaccine: 50+ Years  Completed    HPV IMMUNIZATION  Aged Out    MENINGITIS IMMUNIZATION  Aged Out    LUNG CANCER SCREENING  Discontinued         Review of Systems  Constitutional, HEENT, cardiovascular, pulmonary, GI, , musculoskeletal, neuro, skin, endocrine and psych systems are negative, except as otherwise noted.     Objective    Exam  BP (!) 121/93   Pulse 60   Ht 1.797 m (5' 10.75\")   Wt 114.8 kg (253 lb)   SpO2 98%   BMI 35.54 kg/m     Estimated body mass index is 35.54 kg/m  as calculated from the following:    Height as of this encounter: 1.797 m (5' 10.75\").    Weight as of this encounter: 114.8 kg (253 lb).    Physical Exam  GENERAL: alert and no distress  EYES: Eyes grossly normal to inspection, PERRL and conjunctivae and sclerae normal  HENT: ear canals and TM's normal, nose and mouth without ulcers or lesions  NECK: no adenopathy, no asymmetry, masses, or scars  RESP: lungs clear to auscultation - no rales, rhonchi or wheezes  CV: regular rate and " rhythm, normal S1 S2, no S3 or S4, no murmur, click or rub, no peripheral edema  ABDOMEN: soft, nontender, no hepatosplenomegaly, no masses and bowel sounds normal  MS: no gross musculoskeletal defects noted, no edema  SKIN: no suspicious lesions or rashes  NEURO: Normal strength and tone, mentation intact and speech normal  PSYCH: mentation appears normal, affect normal/bright         4/8/2025   Mini Cog   Clock Draw Score 2 Normal   3 Item Recall 3 objects recalled   Mini Cog Total Score 5              Signed Electronically by: KAJAL Dotson CNP

## 2025-04-27 DIAGNOSIS — E11.49 DM TYPE 2 CAUSING NEUROLOGICAL DISEASE (H): ICD-10-CM

## 2025-04-28 RX ORDER — ATORVASTATIN CALCIUM 20 MG/1
20 TABLET, FILM COATED ORAL
Qty: 90 TABLET | Refills: 0 | Status: SHIPPED | OUTPATIENT
Start: 2025-04-28

## 2025-05-04 LAB — NONINV COLON CA DNA+OCC BLD SCRN STL QL: NEGATIVE

## 2025-05-06 ENCOUNTER — OFFICE VISIT (OUTPATIENT)
Dept: PHARMACY | Facility: PHYSICIAN GROUP | Age: 80
End: 2025-05-06
Payer: COMMERCIAL

## 2025-05-06 VITALS
BODY MASS INDEX: 35.11 KG/M2 | DIASTOLIC BLOOD PRESSURE: 76 MMHG | SYSTOLIC BLOOD PRESSURE: 131 MMHG | WEIGHT: 250 LBS | HEART RATE: 77 BPM | OXYGEN SATURATION: 100 %

## 2025-05-06 DIAGNOSIS — E11.49 DM TYPE 2 CAUSING NEUROLOGICAL DISEASE (H): Primary | ICD-10-CM

## 2025-05-06 DIAGNOSIS — I10 PRIMARY HYPERTENSION: ICD-10-CM

## 2025-05-06 DIAGNOSIS — M10.9 GOUT, UNSPECIFIED CAUSE, UNSPECIFIED CHRONICITY, UNSPECIFIED SITE: ICD-10-CM

## 2025-05-06 DIAGNOSIS — I48.20 CHRONIC ATRIAL FIBRILLATION (H): ICD-10-CM

## 2025-05-06 DIAGNOSIS — M10.9 ACUTE GOUT OF RIGHT ANKLE, UNSPECIFIED CAUSE: ICD-10-CM

## 2025-05-06 DIAGNOSIS — I43 CARDIAC AMYLOIDOSIS (H): ICD-10-CM

## 2025-05-06 DIAGNOSIS — E85.4 CARDIAC AMYLOIDOSIS (H): ICD-10-CM

## 2025-05-06 LAB
HBA1C MFR BLD: 6 % (ref 4–6)
URATE SERPL-MCNC: 5.7 MG/DL (ref 3.4–7)

## 2025-05-06 PROCEDURE — 3078F DIAST BP <80 MM HG: CPT | Performed by: PHARMACIST

## 2025-05-06 PROCEDURE — 84550 ASSAY OF BLOOD/URIC ACID: CPT | Mod: ORL | Performed by: FAMILY MEDICINE

## 2025-05-06 PROCEDURE — 83036 HEMOGLOBIN GLYCOSYLATED A1C: CPT

## 2025-05-06 PROCEDURE — 3075F SYST BP GE 130 - 139MM HG: CPT | Performed by: PHARMACIST

## 2025-05-06 PROCEDURE — 99606 MTMS BY PHARM EST 15 MIN: CPT | Performed by: PHARMACIST

## 2025-05-06 PROCEDURE — 36415 COLL VENOUS BLD VENIPUNCTURE: CPT

## 2025-05-06 PROCEDURE — 99607 MTMS BY PHARM ADDL 15 MIN: CPT | Performed by: PHARMACIST

## 2025-05-06 NOTE — PROGRESS NOTES
Medication Therapy Management (MTM) Encounter    ASSESSMENT:                            Medication Adherence/Access: No issues identified.    Diabetes  Patient is meeting A1c goal of < 7%.  Patient would benefit from increase in Ozempic to further aid in weight loss goals and continue to benefit sugars.     Hypertension /AFib/Cardiac amyloidosis  Stable.     Gout  Will update uric acid again today and titrate allopurinol if still> 6mg/dl. Weight loss and avoiding alcohol discussed as well.    PLAN:                            1. Increase Ozempic 2mg weekly.     2. Uric acid recheck today.     Follow-up: Return in 5 weeks (on 6/10/2025) for MTM visit in clinic.    SUBJECTIVE/OBJECTIVE:                          Abhay Taylor is a 80 year old male seen for a follow-up visit.       Reason for visit: medication review.    Allergies/ADRs: Reviewed in chart  Past Medical History: Reviewed in chart  Tobacco: He reports that he quit smoking about 12 years ago. His smoking use included cigarettes. He started smoking about 22 years ago. He has never used smokeless tobacco.  Alcohol: not currently using, but was drinking in [place].     Medication Adherence/Access: no issues reported.    Diabetes   Ozempic 1mg weekly - Monday.   Current diabetes symptoms: none  Increased craving for sweets, has not had improvements with this since starting GLP1 yet.   Not active.      Blood sugar monitorin time(s) daily; Ranges: (patient reported) none.  Eye exam in the last 12 months? No  Foot exam: due    Hypertension /AFib/Cardiac amyloidosis  Aspirin 81mg daily   Metoprolol tartrate 75mg twice daily  Vyndamax 61mg daily    Watchman placed in - Eliquis was stopped after that.  Following with Dr. Martinez from [facility] for amyloidosis.  History of 3 strokes- lost vision in left eye and partial vision in right.   Patient reports no current medication side effects     Wt Readings from Last 4 Encounters:   25 250 lb  (113.4 kg)   04/08/25 253 lb (114.8 kg)   02/04/25 256 lb (116.1 kg)   11/05/24 255 lb (115.7 kg)     Gout  allopurinol 300mg daily  Colchicine for flares as needed.   Patient reports increased pain -  foot. Had a  flare concern in [place], was drinking more alcohol and diet was poor.   Last Uric acid was right at 6mg/dl and did not increase dose of allopurinol.   Patient is experiencing the following medication side effects: none.     Today's Vitals: /76   Pulse 77   Wt 250 lb (113.4 kg)   SpO2 100%   BMI 35.11 kg/m    ----------------    I spent 20 minutes with this patient today. All changes were made via collaborative practice agreement with Sean Madrid MD. A copy of the visit note was provided to the patient's provider(s).    A summary of these recommendations was given to the patient.    Faith Pimentel, Pharm.D, [facility]  Medication Therapy Management Pharmacist  [phone]     Medication Therapy Recommendations  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)   1 Current Medication: OZEMPIC, 1 MG/DOSE, 4 MG/3ML pen (Discontinued)   Current Medication Sig: INJECT 1 MG UNDER THE SKIN EVERY 7 DAYS   Rationale: Dose too low - Dosage too low - Effectiveness   Recommendation: Increase Dose - Ozempic (2 MG/DOSE) 8 MG/3ML Sopn - 2mg weekly   Status: Accepted per CPA   Identified Date: 5/6/2025 Completed Date: 5/6/2025         Gout, unspecified cause, unspecified chronicity, unspecified site   1 Current Medication: allopurinol (ZYLOPRIM) 300 MG tablet   Current Medication Sig: TAKE 1 TABLET(300 MG) BY MOUTH DAILY   Rationale: Medication requires monitoring - Needs additional monitoring - Effectiveness   Recommendation: Order Lab   Status: Accepted per CPA   Identified Date: 5/6/2025 Completed Date: 5/6/2025

## 2025-05-06 NOTE — PATIENT INSTRUCTIONS
"Recommendations from today's MTM visit:                                                       1. Increase Ozempic 2mg weekly.     2. Uric acid recheck today.     Follow-up: Return in 5 weeks (on 6/10/2025) for MTM visit in clinic.    It was great speaking with you today.  I value your experience and would be very thankful for your time in providing feedback in our clinic survey. In the next few days, you may receive an email or text message from [employer] with a link to a survey related to your  clinical pharmacist.\"     My Clinical Pharmacist's contact information:                                                      Please feel free to contact me with any questions or concerns you have.      Faith Pimentel, Pharm.D, [facility]  Medication Therapy Management Pharmacist  [phone]      "

## 2025-05-07 ENCOUNTER — RESULTS FOLLOW-UP (OUTPATIENT)
Dept: FAMILY MEDICINE | Facility: CLINIC | Age: 80
End: 2025-05-07

## 2025-06-10 ENCOUNTER — RESULTS FOLLOW-UP (OUTPATIENT)
Dept: PHARMACY | Facility: PHYSICIAN GROUP | Age: 80
End: 2025-06-10

## 2025-06-10 ENCOUNTER — OFFICE VISIT (OUTPATIENT)
Dept: PHARMACY | Facility: PHYSICIAN GROUP | Age: 80
End: 2025-06-10
Payer: COMMERCIAL

## 2025-06-10 VITALS
WEIGHT: 244 LBS | HEART RATE: 67 BPM | OXYGEN SATURATION: 96 % | SYSTOLIC BLOOD PRESSURE: 116 MMHG | BODY MASS INDEX: 34.27 KG/M2 | DIASTOLIC BLOOD PRESSURE: 57 MMHG

## 2025-06-10 DIAGNOSIS — I48.20 CHRONIC ATRIAL FIBRILLATION (H): ICD-10-CM

## 2025-06-10 DIAGNOSIS — I43 CARDIAC AMYLOIDOSIS (H): ICD-10-CM

## 2025-06-10 DIAGNOSIS — E11.49 DM TYPE 2 CAUSING NEUROLOGICAL DISEASE (H): Primary | ICD-10-CM

## 2025-06-10 DIAGNOSIS — I10 PRIMARY HYPERTENSION: ICD-10-CM

## 2025-06-10 DIAGNOSIS — E85.4 CARDIAC AMYLOIDOSIS (H): ICD-10-CM

## 2025-06-10 LAB
ANION GAP SERPL CALCULATED.3IONS-SCNC: 11 MMOL/L (ref 7–15)
BUN SERPL-MCNC: 20.2 MG/DL (ref 8–23)
CALCIUM SERPL-MCNC: 9.1 MG/DL (ref 8.8–10.4)
CHLORIDE SERPL-SCNC: 106 MMOL/L (ref 98–107)
CREAT SERPL-MCNC: 1.1 MG/DL (ref 0.67–1.17)
CREAT UR-MCNC: 91.6 MG/DL
EGFRCR SERPLBLD CKD-EPI 2021: 68 ML/MIN/1.73M2
FASTING STATUS PATIENT QL REPORTED: YES
GLUCOSE SERPL-MCNC: 117 MG/DL (ref 70–99)
HCO3 SERPL-SCNC: 25 MMOL/L (ref 22–29)
MICROALBUMIN UR-MCNC: 25.2 MG/L
MICROALBUMIN/CREAT UR: 27.51 MG/G CR (ref 0–17)
POTASSIUM SERPL-SCNC: 4.6 MMOL/L (ref 3.4–5.3)
SODIUM SERPL-SCNC: 142 MMOL/L (ref 135–145)

## 2025-06-10 PROCEDURE — 3074F SYST BP LT 130 MM HG: CPT | Performed by: PHARMACIST

## 2025-06-10 PROCEDURE — 82043 UR ALBUMIN QUANTITATIVE: CPT | Mod: ORL | Performed by: FAMILY MEDICINE

## 2025-06-10 PROCEDURE — 3078F DIAST BP <80 MM HG: CPT | Performed by: PHARMACIST

## 2025-06-10 PROCEDURE — 80048 BASIC METABOLIC PNL TOTAL CA: CPT | Mod: ORL | Performed by: FAMILY MEDICINE

## 2025-06-10 PROCEDURE — 99606 MTMS BY PHARM EST 15 MIN: CPT | Performed by: PHARMACIST

## 2025-06-10 PROCEDURE — 99607 MTMS BY PHARM ADDL 15 MIN: CPT | Performed by: PHARMACIST

## 2025-06-10 PROCEDURE — 36415 COLL VENOUS BLD VENIPUNCTURE: CPT

## 2025-06-10 RX ORDER — DAPAGLIFLOZIN 10 MG/1
10 TABLET, FILM COATED ORAL DAILY
COMMUNITY

## 2025-06-10 NOTE — PATIENT INSTRUCTIONS
"Recommendations from today's MTM visit:                                                       1. Update BMP today for kidney and electrolytes       Follow-up: Return in about 3 months (around 9/10/2025) for MTM visit in clinic.    It was great speaking with you today.  I value your experience and would be very thankful for your time in providing feedback in our clinic survey. In the next few days, you may receive an email or text message from [employer] with a link to a survey related to your  clinical pharmacist.\"     My Clinical Pharmacist's contact information:                                                      Please feel free to contact me with any questions or concerns you have.      Faith Pimenetl, Pharm.D, BCACP  Medication Therapy Management Pharmacist  [phone]      "

## 2025-06-10 NOTE — PROGRESS NOTES
Medication Therapy Management (MTM) Encounter    ASSESSMENT:                            Medication Adherence/Access: No issues identified.    Diabetes   Patient is meeting A1c goal of < 7%.  Patient would benefit from update BMP.     Hypertension /AFib/Cardiac amyloidosis  Patient is meeting blood pressure goal of < 130/80mmHg. Patient would benefit from updated BMP for safety on Farxiga.    PLAN:                            1. Update BMP today for kidney and electrolytes     Follow-up: Return in about 3 months (around 9/10/2025) for MTM visit in clinic.    SUBJECTIVE/OBJECTIVE:                          Abhay Taylor is a 80 year old male seen for a follow-up visit.       Reason for visit: medication review.    Allergies/ADRs: Reviewed in chart  Past Medical History: Reviewed in chart  Tobacco: He reports that he quit smoking about 12 years ago. His smoking use included cigarettes. He started smoking about 22 years ago. He has never used smokeless tobacco.  Alcohol: not currently using    Medication Adherence/Access: no issues reported.    Diabetes   Ozempic 2mg weekly - Mondays. Increased last month  Farxiga 10mg daily started by cardiology 5/6  Current diabetes symptoms: none  Increased craving for sweets, has not had improvements with this since starting GLP1 yet.   Not active. Is spending a lot of time in bed. Trying to get more active.      Blood sugar monitoring: never  Eye exam in the last 12 months? No  Foot exam: due    Hypertension /AFib/Cardiac amyloidosis  Aspirin 81mg daily   Metoprolol tartrate 50mg twice daily  Vyndamax 61mg daily    Farxiga 10mg daily  Watchman placed in 2022- Eliquis was stopped after that.  Following with Dr. Martinez from [facility] for amyloidosis.  History of 3 strokes- lost vision in left eye and partial vision in right.   amyloid with preserved LV function with elevated pro-BNP, SGLT2i started for this.   Patient reports no current medication side effects     Wt  Readings from Last 4 Encounters:   06/10/25 244 lb (110.7 kg)   05/06/25 250 lb (113.4 kg)   04/08/25 253 lb (114.8 kg)   02/04/25 256 lb (116.1 kg)       Today's Vitals: /57   Pulse 67   Wt 244 lb (110.7 kg)   SpO2 96%   BMI 34.27 kg/m    ----------------    I spent 25 minutes with this patient today. All changes were made via collaborative practice agreement with Sean Madrid MD. A copy of the visit note was provided to the patient's provider(s).    A summary of these recommendations was given to the patient.    Faith Pimentel, Pharm.D, Psychiatric  Medication Therapy Management Pharmacist  [phone]     Medication Therapy Recommendations  Cardiac amyloidosis (H)   1 Current Medication: dapagliflozin (FARXIGA) 10 MG TABS tablet   Current Medication Sig: Take 10 mg by mouth daily.   Rationale: Medication requires monitoring - Needs additional monitoring   Recommendation: Order Lab - BMP needed   Status: Accepted per CPA   Identified Date: 6/10/2025 Completed Date: 6/10/2025

## 2025-06-25 DIAGNOSIS — Z86.73 HISTORY OF STROKE: ICD-10-CM

## 2025-06-25 NOTE — TELEPHONE ENCOUNTER
Med: Fenofibrate     Last refill 10/9/24 #90, 1-R       LOV (related): 4/8/25 (CPX)       Due for F/U around: 6 months for DM check 11/6/25/ 1 year for CPX      Next Appt: [place] [facility]

## 2025-06-26 RX ORDER — FENOFIBRATE 160 MG/1
160 TABLET ORAL DAILY
Qty: 90 TABLET | Refills: 2 | Status: SHIPPED | OUTPATIENT
Start: 2025-06-26

## 2025-07-29 ENCOUNTER — TELEPHONE (OUTPATIENT)
Dept: FAMILY MEDICINE | Facility: CLINIC | Age: 80
End: 2025-07-29

## 2025-08-04 DIAGNOSIS — E11.49 DM TYPE 2 CAUSING NEUROLOGICAL DISEASE (H): ICD-10-CM

## 2025-08-04 RX ORDER — ATORVASTATIN CALCIUM 20 MG/1
20 TABLET, FILM COATED ORAL DAILY
Qty: 90 TABLET | Refills: 0 | Status: SHIPPED | OUTPATIENT
Start: 2025-08-04

## 2025-08-21 ENCOUNTER — OFFICE VISIT (OUTPATIENT)
Dept: PHARMACY | Facility: PHYSICIAN GROUP | Age: 80
End: 2025-08-21
Payer: COMMERCIAL

## 2025-08-21 VITALS
DIASTOLIC BLOOD PRESSURE: 59 MMHG | WEIGHT: 233 LBS | HEART RATE: 60 BPM | SYSTOLIC BLOOD PRESSURE: 121 MMHG | OXYGEN SATURATION: 97 % | BODY MASS INDEX: 32.73 KG/M2

## 2025-08-21 DIAGNOSIS — M1A.09X0 IDIOPATHIC CHRONIC GOUT OF MULTIPLE SITES WITHOUT TOPHUS: ICD-10-CM

## 2025-08-21 DIAGNOSIS — M19.90 ARTHRITIS: ICD-10-CM

## 2025-08-21 DIAGNOSIS — I43 CARDIAC AMYLOIDOSIS (H): ICD-10-CM

## 2025-08-21 DIAGNOSIS — E11.49 DM TYPE 2 CAUSING NEUROLOGICAL DISEASE (H): Primary | ICD-10-CM

## 2025-08-21 DIAGNOSIS — I10 PRIMARY HYPERTENSION: ICD-10-CM

## 2025-08-21 DIAGNOSIS — G31.84 MILD COGNITIVE IMPAIRMENT: ICD-10-CM

## 2025-08-21 DIAGNOSIS — F33.1 MODERATE EPISODE OF RECURRENT MAJOR DEPRESSIVE DISORDER (H): ICD-10-CM

## 2025-08-21 DIAGNOSIS — E85.4 CARDIAC AMYLOIDOSIS (H): ICD-10-CM

## 2025-08-21 DIAGNOSIS — E11.42 DIABETIC POLYNEUROPATHY ASSOCIATED WITH TYPE 2 DIABETES MELLITUS (H): ICD-10-CM

## 2025-08-21 DIAGNOSIS — I48.20 CHRONIC ATRIAL FIBRILLATION (H): ICD-10-CM

## 2025-08-21 LAB — HBA1C MFR BLD: 5.8 % (ref 4–6)

## 2025-08-21 RX ORDER — GABAPENTIN 300 MG/1
300 CAPSULE ORAL 2 TIMES DAILY
Qty: 180 CAPSULE | Refills: 3 | Status: SHIPPED | OUTPATIENT
Start: 2025-08-21

## 2025-08-21 RX ORDER — ALLOPURINOL 300 MG/1
150 TABLET ORAL DAILY
Qty: 45 TABLET | Refills: 3 | Status: SHIPPED | OUTPATIENT
Start: 2025-08-21